# Patient Record
Sex: FEMALE | Race: OTHER | HISPANIC OR LATINO | Employment: UNEMPLOYED | URBAN - METROPOLITAN AREA
[De-identification: names, ages, dates, MRNs, and addresses within clinical notes are randomized per-mention and may not be internally consistent; named-entity substitution may affect disease eponyms.]

---

## 2018-04-19 ENCOUNTER — OFFICE VISIT (OUTPATIENT)
Dept: OBSTETRICS AND GYNECOLOGY | Facility: CLINIC | Age: 43
End: 2018-04-19
Payer: MEDICAID

## 2018-04-19 ENCOUNTER — HOSPITAL ENCOUNTER (OUTPATIENT)
Facility: HOSPITAL | Age: 43
Discharge: HOME | End: 2018-04-20
Attending: OBSTETRICS & GYNECOLOGY | Admitting: OBSTETRICS & GYNECOLOGY
Payer: MEDICAID

## 2018-04-19 ENCOUNTER — PROCEDURE VISIT (OUTPATIENT)
Dept: OBSTETRICS AND GYNECOLOGY | Facility: CLINIC | Age: 43
End: 2018-04-19
Payer: MEDICAID

## 2018-04-19 VITALS
BODY MASS INDEX: 51.96 KG/M2 | DIASTOLIC BLOOD PRESSURE: 86 MMHG | WEIGHT: 231 LBS | HEIGHT: 56 IN | SYSTOLIC BLOOD PRESSURE: 130 MMHG

## 2018-04-19 DIAGNOSIS — O03.9 SAB (SPONTANEOUS ABORTION): ICD-10-CM

## 2018-04-19 DIAGNOSIS — Z32.01 POSITIVE PREGNANCY TEST: ICD-10-CM

## 2018-04-19 DIAGNOSIS — O36.80X0 PREGNANCY OF UNKNOWN ANATOMIC LOCATION: ICD-10-CM

## 2018-04-19 DIAGNOSIS — O02.1 MISSED ABORTION: Primary | ICD-10-CM

## 2018-04-19 DIAGNOSIS — Z32.01 POSITIVE PREGNANCY TEST: Primary | ICD-10-CM

## 2018-04-19 DIAGNOSIS — O03.9 COMPLETE ABORTION: Primary | ICD-10-CM

## 2018-04-19 LAB
ABO + RH BLD: NORMAL
ANION GAP SERPL CALC-SCNC: 10 MMOL/L
BASOPHILS # BLD AUTO: 0.02 K/UL
BASOPHILS NFR BLD: 0.2 %
BLD GP AB SCN CELLS X3 SERPL QL: NORMAL
BUN SERPL-MCNC: 8 MG/DL
CALCIUM SERPL-MCNC: 9.2 MG/DL
CHLORIDE SERPL-SCNC: 108 MMOL/L
CO2 SERPL-SCNC: 21 MMOL/L
CREAT SERPL-MCNC: 0.6 MG/DL
DIFFERENTIAL METHOD: ABNORMAL
EOSINOPHIL # BLD AUTO: 0.1 K/UL
EOSINOPHIL NFR BLD: 0.6 %
ERYTHROCYTE [DISTWIDTH] IN BLOOD BY AUTOMATED COUNT: 14.9 %
EST. GFR  (AFRICAN AMERICAN): >60 ML/MIN/1.73 M^2
EST. GFR  (NON AFRICAN AMERICAN): >60 ML/MIN/1.73 M^2
GLUCOSE SERPL-MCNC: 82 MG/DL
HCG INTACT+B SERPL-ACNC: 550 MIU/ML
HCT VFR BLD AUTO: 35.3 %
HGB BLD-MCNC: 11.5 G/DL
LYMPHOCYTES # BLD AUTO: 2.5 K/UL
LYMPHOCYTES NFR BLD: 29.9 %
MCH RBC QN AUTO: 26.7 PG
MCHC RBC AUTO-ENTMCNC: 32.6 G/DL
MCV RBC AUTO: 82 FL
MONOCYTES # BLD AUTO: 0.4 K/UL
MONOCYTES NFR BLD: 4.9 %
NEUTROPHILS # BLD AUTO: 5.4 K/UL
NEUTROPHILS NFR BLD: 64.2 %
PLATELET # BLD AUTO: 259 K/UL
PMV BLD AUTO: 9.7 FL
POTASSIUM SERPL-SCNC: 3.6 MMOL/L
RBC # BLD AUTO: 4.3 M/UL
SODIUM SERPL-SCNC: 139 MMOL/L
WBC # BLD AUTO: 8.36 K/UL

## 2018-04-19 PROCEDURE — 99999 PR PBB SHADOW E&M-NEW PATIENT-LVL III: CPT | Mod: PBBFAC,,, | Performed by: OBSTETRICS & GYNECOLOGY

## 2018-04-19 PROCEDURE — 88305 TISSUE EXAM BY PATHOLOGIST: CPT | Performed by: PATHOLOGY

## 2018-04-19 PROCEDURE — 80048 BASIC METABOLIC PNL TOTAL CA: CPT

## 2018-04-19 PROCEDURE — 25000003 PHARM REV CODE 250: Performed by: OBSTETRICS & GYNECOLOGY

## 2018-04-19 PROCEDURE — G0379 DIRECT REFER HOSPITAL OBSERV: HCPCS

## 2018-04-19 PROCEDURE — 99203 OFFICE O/P NEW LOW 30 MIN: CPT | Mod: PBBFAC,TH,PO,25 | Performed by: OBSTETRICS & GYNECOLOGY

## 2018-04-19 PROCEDURE — 99203 OFFICE O/P NEW LOW 30 MIN: CPT | Mod: S$PBB,25,TH, | Performed by: OBSTETRICS & GYNECOLOGY

## 2018-04-19 PROCEDURE — G0378 HOSPITAL OBSERVATION PER HR: HCPCS

## 2018-04-19 PROCEDURE — 99220 PR INITIAL OBSERVATION CARE,LEVL III: CPT | Mod: ,,, | Performed by: OBSTETRICS & GYNECOLOGY

## 2018-04-19 PROCEDURE — 85025 COMPLETE CBC W/AUTO DIFF WBC: CPT

## 2018-04-19 PROCEDURE — 84702 CHORIONIC GONADOTROPIN TEST: CPT

## 2018-04-19 PROCEDURE — 76817 TRANSVAGINAL US OBSTETRIC: CPT | Mod: 26,S$PBB,, | Performed by: OBSTETRICS & GYNECOLOGY

## 2018-04-19 PROCEDURE — 76817 TRANSVAGINAL US OBSTETRIC: CPT | Mod: PBBFAC,PO

## 2018-04-19 PROCEDURE — 88305 TISSUE EXAM BY PATHOLOGIST: CPT | Mod: 26,,, | Performed by: PATHOLOGY

## 2018-04-19 PROCEDURE — 86850 RBC ANTIBODY SCREEN: CPT

## 2018-04-19 RX ORDER — MISOPROSTOL 200 UG/1
400 TABLET ORAL EVERY 6 HOURS
Status: DISCONTINUED | OUTPATIENT
Start: 2018-04-19 | End: 2018-04-20 | Stop reason: HOSPADM

## 2018-04-19 RX ORDER — DOXYCYCLINE HYCLATE 100 MG
100 TABLET ORAL EVERY 12 HOURS
Status: DISCONTINUED | OUTPATIENT
Start: 2018-04-19 | End: 2018-04-19

## 2018-04-19 RX ORDER — SODIUM CHLORIDE 0.9 % (FLUSH) 0.9 %
3 SYRINGE (ML) INJECTION
Status: DISCONTINUED | OUTPATIENT
Start: 2018-04-19 | End: 2018-04-20 | Stop reason: HOSPADM

## 2018-04-19 RX ORDER — HYDROMORPHONE HYDROCHLORIDE 2 MG/ML
1 INJECTION, SOLUTION INTRAMUSCULAR; INTRAVENOUS; SUBCUTANEOUS EVERY 4 HOURS PRN
Status: DISCONTINUED | OUTPATIENT
Start: 2018-04-19 | End: 2018-04-20 | Stop reason: HOSPADM

## 2018-04-19 RX ORDER — DOXYCYCLINE HYCLATE 100 MG
200 TABLET ORAL ONCE
Status: COMPLETED | OUTPATIENT
Start: 2018-04-19 | End: 2018-04-19

## 2018-04-19 RX ORDER — MISOPROSTOL 100 UG/1
400 TABLET ORAL EVERY 8 HOURS
Status: CANCELLED | OUTPATIENT
Start: 2018-04-19 | End: 2018-04-20

## 2018-04-19 RX ORDER — DOXYCYCLINE HYCLATE 100 MG
100 TABLET ORAL EVERY 12 HOURS
Status: DISCONTINUED | OUTPATIENT
Start: 2018-04-20 | End: 2018-04-20 | Stop reason: HOSPADM

## 2018-04-19 RX ORDER — HYDROCODONE BITARTRATE AND ACETAMINOPHEN 10; 325 MG/1; MG/1
1 TABLET ORAL EVERY 4 HOURS PRN
Status: CANCELLED | OUTPATIENT
Start: 2018-04-19

## 2018-04-19 RX ORDER — MISOPROSTOL 200 UG/1
400 TABLET ORAL EVERY 8 HOURS
Status: DISCONTINUED | OUTPATIENT
Start: 2018-04-19 | End: 2018-04-19

## 2018-04-19 RX ORDER — ONDANSETRON 4 MG/1
8 TABLET, ORALLY DISINTEGRATING ORAL EVERY 8 HOURS PRN
Status: CANCELLED | OUTPATIENT
Start: 2018-04-19

## 2018-04-19 RX ORDER — ONDANSETRON 8 MG/1
8 TABLET, ORALLY DISINTEGRATING ORAL EVERY 8 HOURS PRN
Status: DISCONTINUED | OUTPATIENT
Start: 2018-04-19 | End: 2018-04-20 | Stop reason: HOSPADM

## 2018-04-19 RX ORDER — IBUPROFEN 600 MG/1
600 TABLET ORAL EVERY 6 HOURS PRN
Status: CANCELLED | OUTPATIENT
Start: 2018-04-19

## 2018-04-19 RX ORDER — HYDROMORPHONE HYDROCHLORIDE 2 MG/ML
1 INJECTION, SOLUTION INTRAMUSCULAR; INTRAVENOUS; SUBCUTANEOUS EVERY 4 HOURS PRN
Status: CANCELLED | OUTPATIENT
Start: 2018-04-19

## 2018-04-19 RX ORDER — HYDROCODONE BITARTRATE AND ACETAMINOPHEN 10; 325 MG/1; MG/1
1 TABLET ORAL EVERY 4 HOURS PRN
Status: DISCONTINUED | OUTPATIENT
Start: 2018-04-19 | End: 2018-04-20 | Stop reason: HOSPADM

## 2018-04-19 RX ORDER — SODIUM CHLORIDE 0.9 % (FLUSH) 0.9 %
3 SYRINGE (ML) INJECTION
Status: CANCELLED | OUTPATIENT
Start: 2018-04-19

## 2018-04-19 RX ORDER — HYDROCODONE BITARTRATE AND ACETAMINOPHEN 5; 325 MG/1; MG/1
1 TABLET ORAL EVERY 4 HOURS PRN
Status: CANCELLED | OUTPATIENT
Start: 2018-04-19

## 2018-04-19 RX ORDER — HYDROCODONE BITARTRATE AND ACETAMINOPHEN 5; 325 MG/1; MG/1
1 TABLET ORAL EVERY 4 HOURS PRN
Status: DISCONTINUED | OUTPATIENT
Start: 2018-04-19 | End: 2018-04-20 | Stop reason: HOSPADM

## 2018-04-19 RX ORDER — MISOPROSTOL 200 UG/1
400 TABLET ORAL EVERY 6 HOURS
Status: DISCONTINUED | OUTPATIENT
Start: 2018-04-19 | End: 2018-04-19

## 2018-04-19 RX ORDER — IBUPROFEN 600 MG/1
600 TABLET ORAL EVERY 6 HOURS PRN
Status: DISCONTINUED | OUTPATIENT
Start: 2018-04-19 | End: 2018-04-20 | Stop reason: HOSPADM

## 2018-04-19 RX ADMIN — HYDROCODONE BITARTRATE AND ACETAMINOPHEN 1 TABLET: 10; 325 TABLET ORAL at 11:04

## 2018-04-19 RX ADMIN — MISOPROSTOL 400 MCG: 200 TABLET ORAL at 11:04

## 2018-04-19 RX ADMIN — DOXYCYCLINE HYCLATE 200 MG: 100 TABLET, COATED ORAL at 06:04

## 2018-04-19 RX ADMIN — IBUPROFEN 600 MG: 600 TABLET, FILM COATED ORAL at 11:04

## 2018-04-19 RX ADMIN — MISOPROSTOL 400 MCG: 200 TABLET ORAL at 06:04

## 2018-04-19 NOTE — H&P
CC miscarriage     HPI:   Sydnee Don 43 y.o.   at 12 wga by lmp. She had her last cycle 18. She thought she was going through menopause then started bleeding a week ago then had heavier bleeding yesterday so she went to [facility] and was told she was pregnant so went to  ER and was told she was having a SAB and to f/u with OB gyn. She reports the bleeding is less but she is still having cramping.        No LMP recorded.      History reviewed. No pertinent past medical history.           Past Surgical History:   Procedure Laterality Date     SECTION                   Family History   Problem Relation Age of Onset    Hyperlipidemia Father      Hypertension Father      Hyperlipidemia Mother      Hypertension Mother      Arthritis Mother           Social History   Social History            Social History    Marital status:        Spouse name: N/A    Number of children: N/A    Years of education: N/A          Occupational History    Not on file.            Social History Main Topics    Smoking status: Never Smoker    Smokeless tobacco: Never Used    Alcohol use No    Drug use: No    Sexual activity: Yes       Partners: Male           Other Topics Concern    Not on file          Social History Narrative    No narrative on file                     OB History       Para Term  AB Living     7 4 4   3 4     SAB TAB Ectopic Multiple Live Births                          COMPREHENSIVE GYN HISTORY:  PAP History: Denies abnormal Paps.  Infection History: Denies STDs. Denies PID.  Benign History: Denies uterine fibroids. Denies ovarian cysts. Denies endometriosis.   Cancer History: Denies cervical cancer. Denies uterine cancer or hyperplasia. Denies ovarian cancer. Denies vulvar cancer or pre-cancer. Denies vaginal cancer or pre-cancer. Denies breast cancer. Denies colon cancer.  Sexual Activity History:   reports that she currently engages in sexual activity and has had  "male partners.   Menstrual History: Age of menarche:11 years. Every mon, flows for 5d days. moderate flow.  Dysmenorrhea History: Reports mild dysmenorrhea.  Contraception: none         ROS:  GENERAL: Denies weight gain or weight loss. Feeling well overall.   SKIN: Denies rash or lesions.   HEAD: Denies headache.   CHEST: Denies chest pain   RESPIRATORY: Denies shortness of breath  ABDOMEN: No abdominal pain, constipation, diarrhea, nausea, vomiting or rectal bleeding.   URINARY: No frequency, dysuria, hematuria, or burning on urination.  REPRODUCTIVE: See HPI.   All other ROS negative     PE:   /86   Ht 4' 8" (1.422 m)   Wt 104.8 kg (231 lb)   BMI 51.79 kg/m²      APPEARANCE: Well nourished, well developed, in no acute distress.  NEUROLOGIC: orientated to person, place and time, normal mood and affect   NECK: no masses, tracheal position normal, thyroid not enlarged, no masses   SKIN: no rashes or lesions  RESPIRATORY: normal respiratory effort, no use of accessory muscles   CARDIOVASCULAR: RRR, no murmurs, extremities normal with no edema    ABDOMEN: Soft. No tenderness or masses. No hernias. No hepatosplenomegaly noted.   PELVIC:   EXTERNAL GENITALIA/VULVA: No lesions. Normal female genitalia.  URETHRAL MEATUS: Normal size and location, no lesions, no prolapse.  URETHRA: No masses, tenderness, prolapse or scarring.  BLADDER: non-tender, no masses  VAGINA: Moist and well rugated, no discharge, no significant cystocele or rectocele.  CERVIX: dilated 2 cm with POC coming through os, removed with ring forceps  UTERUS: 14 week size, regular shape, mobile, non-tender, bladder base nontender.  ADNEXA: No masses or tenderness.  PERINEUM: normal in appearance, no external hemorrhoids      TVUS: uterus 15x6.2x7cm with EMS of 1.7cm with with POC and GS in cervix that appears dilated     1. Positive pregnancy test    2. Pregnancy of unknown anatomic location    3. SAB (spontaneous )          Plan:  1. " Patient with incomplete Ab. We discussed options. I recommend D&C since her cervix is open and she would be at risk for infection but she would not like to do that at this time. We discussed that I would recommend observation with cytotec so we could monitor her bleeding. We could check blood counts and group and RH and HCG at that time will do and repeat US in the am.

## 2018-04-19 NOTE — PROGRESS NOTES
Chief Complaint   Patient presents with    Miscarriage       HPI:   Sdynee Don 43 y.o.   at 12 wga by lmp. She had her last cycle 18. She thought she was going through menopause then started bleeding a week ago then had heavier bleeding yesterday so she went to DOC and was told she was pregnant so went to  ER and was told she was having a SAB and to f/u with OB gyn. She reports the bleeding is less but she is still having cramping.       No LMP recorded.     History reviewed. No pertinent past medical history.    Past Surgical History:   Procedure Laterality Date     SECTION         Family History   Problem Relation Age of Onset    Hyperlipidemia Father     Hypertension Father     Hyperlipidemia Mother     Hypertension Mother     Arthritis Mother        Social History     Social History    Marital status:      Spouse name: N/A    Number of children: N/A    Years of education: N/A     Occupational History    Not on file.     Social History Main Topics    Smoking status: Never Smoker    Smokeless tobacco: Never Used    Alcohol use No    Drug use: No    Sexual activity: Yes     Partners: Male     Other Topics Concern    Not on file     Social History Narrative    No narrative on file       OB History      Para Term  AB Living    7 4 4   3 4    SAB TAB Ectopic Multiple Live Births                        COMPREHENSIVE GYN HISTORY:  PAP History: Denies abnormal Paps.  Infection History: Denies STDs. Denies PID.  Benign History: Denies uterine fibroids. Denies ovarian cysts. Denies endometriosis.   Cancer History: Denies cervical cancer. Denies uterine cancer or hyperplasia. Denies ovarian cancer. Denies vulvar cancer or pre-cancer. Denies vaginal cancer or pre-cancer. Denies breast cancer. Denies colon cancer.  Sexual Activity History:   reports that she currently engages in sexual activity and has had male partners.   Menstrual History: Age of menarche:11  "years. Every mon, flows for 5d days. moderate flow.  Dysmenorrhea History: Reports mild dysmenorrhea.  Contraception: none       ROS:  GENERAL: Denies weight gain or weight loss. Feeling well overall.   SKIN: Denies rash or lesions.   HEAD: Denies headache.   CHEST: Denies chest pain   RESPIRATORY: Denies shortness of breath  ABDOMEN: No abdominal pain, constipation, diarrhea, nausea, vomiting or rectal bleeding.   URINARY: No frequency, dysuria, hematuria, or burning on urination.  REPRODUCTIVE: See HPI.   All other ROS negative     PE:   /86   Ht 4' 8" (1.422 m)   Wt 104.8 kg (231 lb)   BMI 51.79 kg/m²     APPEARANCE: Well nourished, well developed, in no acute distress.  NEUROLOGIC: orientated to person, place and time, normal mood and affect   NECK: no masses, tracheal position normal, thyroid not enlarged, no masses   SKIN: no rashes or lesions  RESPIRATORY: normal respiratory effort, no use of accessory muscles   CARDIOVASCULAR: RRR, no murmurs, extremities normal with no edema    ABDOMEN: Soft. No tenderness or masses. No hernias. No hepatosplenomegaly noted.   PELVIC:   EXTERNAL GENITALIA/VULVA: No lesions. Normal female genitalia.  URETHRAL MEATUS: Normal size and location, no lesions, no prolapse.  URETHRA: No masses, tenderness, prolapse or scarring.  BLADDER: non-tender, no masses  VAGINA: Moist and well rugated, no discharge, no significant cystocele or rectocele.  CERVIX: dilated 2 cm with POC coming through os, removed with ring forceps  UTERUS: 14 week size, regular shape, mobile, non-tender, bladder base nontender.  ADNEXA: No masses or tenderness.  PERINEUM: normal in appearance, no external hemorrhoids     TVUS: uterus 15x6.2x7cm with EMS of 1.7cm with with POC and GS in cervix that appears dilated    1. Positive pregnancy test    2. Pregnancy of unknown anatomic location    3. SAB (spontaneous )        Plan:  1. Patient with incomplete Ab. We discussed options. I recommend D&C " since her cervix is open and she would be at risk for infection but she would not like to do that at this time. We discussed that I would recommend observation with cytotec so we could monitor her bleeding. We could check blood counts and group and RH and HCG at that time will do and repeat US in the am.

## 2018-04-19 NOTE — PLAN OF CARE
Admitted to unit, via pt ambulating as direct admit, with no distress noted.  Resp even and unlabored.  Accompanied by family member. Sitting up in bed.  Oriented pt to room, surroundings, and call light.  Verbalized understanding.  Denies pain at present.  Resting quietly with no distress.  Safety maintained with  Call light in reach.

## 2018-04-20 ENCOUNTER — TELEPHONE (OUTPATIENT)
Dept: OBSTETRICS AND GYNECOLOGY | Facility: CLINIC | Age: 43
End: 2018-04-20

## 2018-04-20 VITALS
TEMPERATURE: 98 F | OXYGEN SATURATION: 100 % | DIASTOLIC BLOOD PRESSURE: 63 MMHG | HEART RATE: 66 BPM | SYSTOLIC BLOOD PRESSURE: 117 MMHG | RESPIRATION RATE: 18 BRPM

## 2018-04-20 DIAGNOSIS — O03.4 INCOMPLETE ABORTION: Primary | ICD-10-CM

## 2018-04-20 PROBLEM — D50.0 IRON DEFICIENCY ANEMIA DUE TO CHRONIC BLOOD LOSS: Status: ACTIVE | Noted: 2018-04-20

## 2018-04-20 PROBLEM — O03.9 COMPLETE ABORTION: Status: ACTIVE | Noted: 2018-04-20

## 2018-04-20 LAB
BASOPHILS # BLD AUTO: 0.02 K/UL
BASOPHILS NFR BLD: 0.2 %
DIFFERENTIAL METHOD: ABNORMAL
EOSINOPHIL # BLD AUTO: 0.1 K/UL
EOSINOPHIL NFR BLD: 1.7 %
ERYTHROCYTE [DISTWIDTH] IN BLOOD BY AUTOMATED COUNT: 14.7 %
HCT VFR BLD AUTO: 33.5 %
HGB BLD-MCNC: 10.9 G/DL
LYMPHOCYTES # BLD AUTO: 3.9 K/UL
LYMPHOCYTES NFR BLD: 48.2 %
MCH RBC QN AUTO: 26.7 PG
MCHC RBC AUTO-ENTMCNC: 32.5 G/DL
MCV RBC AUTO: 82 FL
MONOCYTES # BLD AUTO: 0.6 K/UL
MONOCYTES NFR BLD: 6.7 %
NEUTROPHILS # BLD AUTO: 3.5 K/UL
NEUTROPHILS NFR BLD: 43 %
PLATELET # BLD AUTO: 238 K/UL
PMV BLD AUTO: 9.3 FL
RBC # BLD AUTO: 4.08 M/UL
WBC # BLD AUTO: 8.17 K/UL

## 2018-04-20 PROCEDURE — 25000003 PHARM REV CODE 250: Performed by: OBSTETRICS & GYNECOLOGY

## 2018-04-20 PROCEDURE — 85025 COMPLETE CBC W/AUTO DIFF WBC: CPT

## 2018-04-20 PROCEDURE — 36415 COLL VENOUS BLD VENIPUNCTURE: CPT

## 2018-04-20 PROCEDURE — 99225 PR SUBSEQUENT OBSERVATION CARE,LEVEL II: CPT | Mod: ,,, | Performed by: OBSTETRICS & GYNECOLOGY

## 2018-04-20 PROCEDURE — G0378 HOSPITAL OBSERVATION PER HR: HCPCS

## 2018-04-20 RX ORDER — IBUPROFEN 600 MG/1
600 TABLET ORAL EVERY 6 HOURS PRN
Qty: 30 TABLET | Refills: 0 | Status: SHIPPED | OUTPATIENT
Start: 2018-04-20 | End: 2018-05-29 | Stop reason: SDUPTHER

## 2018-04-20 RX ADMIN — MISOPROSTOL 400 MCG: 200 TABLET ORAL at 11:04

## 2018-04-20 RX ADMIN — MISOPROSTOL 400 MCG: 200 TABLET ORAL at 06:04

## 2018-04-20 RX ADMIN — DOXYCYCLINE HYCLATE 100 MG: 100 TABLET, COATED ORAL at 06:04

## 2018-04-20 NOTE — PLAN OF CARE
This nurse attempted to start SL x 2 unsuccessful attempts.  20 g to Rt FA and 22 g to Rt hand unsuccessful.  Naila Desouza/charge RN attemtped to start SL x 2 unsuccessful attempts.  20 g to Rt FA and 22 g to Lt Fa.  Xenia Barr Rn, attempted SL x 2 unsuccessful attempts.  20g to Rt and Lt Fa unsuccessful.  Will report to oncoming nurse of unable to start Sl.  Informed pt ok for regular diet.  Dietary notified.  Informed pt of NPO after Mn.  Pt verbalized understanding, and written on dry erase board in room.

## 2018-04-20 NOTE — DISCHARGE INSTRUCTIONS
Patient Discharge Instructions for Postpartum Women    Resume Regular Diet  Increase activity gradually, no heavy lifting  Shower  No tampons, douching or sexual intercourse.  Discuss birth control options with your physician.  Wear a support bra  Return to work/school when you've been cleared by a physician    Call your physician if     *Fever of 100.4 or higher  *Persistent nausea/ vomiting    *Heavy vaginal bleeding or large clots (Heavy bleeding is soaking 1 pad in an hour)  *Swelling and pain in arms or legs  *Severe headaches, blurred vision or fainting  *Shortness of breath  *Frequency and burning with urination  *Signs of postpartum depression, discuss these signs with your physician

## 2018-04-20 NOTE — PROGRESS NOTES
Pt to U/S via wheelchair with transporter    0928  Pt arrived back to room from u/s in stable condition

## 2018-04-20 NOTE — PROGRESS NOTES
2018  HD 2    Sydnee Don 43 y.o.  who was seen in the office yesterday and was noted to have an incomplete AB with POC coming through the cervical os. They were removed with a ring forceps and US showed thickened EMS. Her cervix remained opened but patient declined D&C so admitted for observation. She is s/p 2 doses of cytotec. She reports her bleeding was heavy at first but is now light. RN reports light bleeding all night. Tolerating PO without N/V though made NPO this am. Ambulating and urinating without difficulty. Has passed gas but no Bm. Scant vaginal bleeding. Pain controlled with Po pain medications.     Temp:  [97.7 °F (36.5 °C)-98.4 °F (36.9 °C)]   Pulse:  [66-98]   Resp:  [16-18]   BP: (109-143)/(56-93)   SpO2:  [99 %]     In bed, NAD, RRR, CTA B, abd: S/aTTP/ND   Pelvic: cervix closed, uterus 12 week sized, non tender   Ext: no c/c/e    Yesterday H/H: 11.5/35.3    Recent Labs  Lab 18  0709   WBC 8.17   RBC 4.08   HGB 10.9*   HCT 33.5*      MCV 82   MCH 26.7*   MCHC 32.5     HC   A+     A/P: 43 y.o. with incomplete AB   1. Discussed with patient the concern would be for retained POC. However her cervix is closed this am which is a good indication that the ab was likely completed in the office. H/H decreased slightly.  Will  recheck US if improved will discharge with 2 more doses of cytotec and see back in the office next week for repeat Us. Discussed with her we will need to follow her HCGs to zero. Discussed precautions of infection and bleeding and when to come back to Er.

## 2018-04-20 NOTE — PLAN OF CARE
52395 - Pt has remained NPO since midnight. Voids without difficulty. Minimal bleeding noted to seamus pads throughout shift. Small clots noted to pad once. C/o cramping and HA better with po pain meds. SCD's in place and working. VSS. No other needs or concerns voiced.

## 2018-04-20 NOTE — DISCHARGE SUMMARY
Admitting Diagnosis: incomplete AB   Discharge Diagnosis: complete AB   Service: OB-GYN, [facility]   Consults: none   Disposition: home  Hospital Course: Patient was admitted to mother baby for monitoring after likely completing AB in office. She was given cytotec 3 doses and doxycyline since her cervix was opened. She declined a D&C. Her vaginal bleeding was scant and her repeat US the next morning shows EMS 8mm.   Medications: OTC ibuprofen,  Follow up: in 1 week in clinic to get US and visit   Instructions:   Call or return to ED for fever >100.4, foul smelling vaginal discharge, heavy vaginal bleeding, abdominal pain not responsive to medications or other concerns.

## 2018-04-23 PROBLEM — D50.0 IRON DEFICIENCY ANEMIA DUE TO CHRONIC BLOOD LOSS: Status: ACTIVE | Noted: 2018-04-23

## 2018-04-23 PROBLEM — O03.9 COMPLETE ABORTION: Status: ACTIVE | Noted: 2018-04-23

## 2018-04-26 DIAGNOSIS — O03.9 COMPLETE ABORTION: Primary | ICD-10-CM

## 2018-04-27 ENCOUNTER — PROCEDURE VISIT (OUTPATIENT)
Dept: OBSTETRICS AND GYNECOLOGY | Facility: CLINIC | Age: 43
End: 2018-04-27
Payer: MEDICAID

## 2018-04-27 ENCOUNTER — OFFICE VISIT (OUTPATIENT)
Dept: OBSTETRICS AND GYNECOLOGY | Facility: CLINIC | Age: 43
End: 2018-04-27
Payer: MEDICAID

## 2018-04-27 ENCOUNTER — PATIENT MESSAGE (OUTPATIENT)
Dept: OBSTETRICS AND GYNECOLOGY | Facility: CLINIC | Age: 43
End: 2018-04-27

## 2018-04-27 ENCOUNTER — LAB VISIT (OUTPATIENT)
Dept: LAB | Facility: HOSPITAL | Age: 43
End: 2018-04-27
Attending: OBSTETRICS & GYNECOLOGY
Payer: MEDICAID

## 2018-04-27 VITALS
SYSTOLIC BLOOD PRESSURE: 122 MMHG | BODY MASS INDEX: 51.96 KG/M2 | HEIGHT: 56 IN | DIASTOLIC BLOOD PRESSURE: 80 MMHG | WEIGHT: 231 LBS

## 2018-04-27 DIAGNOSIS — D50.0 IRON DEFICIENCY ANEMIA DUE TO CHRONIC BLOOD LOSS: ICD-10-CM

## 2018-04-27 DIAGNOSIS — O03.9 SPONTANEOUS ABORTION: Primary | ICD-10-CM

## 2018-04-27 DIAGNOSIS — Z32.01 POSITIVE PREGNANCY TEST: ICD-10-CM

## 2018-04-27 DIAGNOSIS — O03.9 COMPLETE ABORTION: ICD-10-CM

## 2018-04-27 DIAGNOSIS — R93.89 INCREASED ENDOMETRIAL STRIPE THICKNESS: ICD-10-CM

## 2018-04-27 DIAGNOSIS — O03.9 SAB (SPONTANEOUS ABORTION): ICD-10-CM

## 2018-04-27 DIAGNOSIS — O36.80X0 PREGNANCY OF UNKNOWN ANATOMIC LOCATION: ICD-10-CM

## 2018-04-27 DIAGNOSIS — O03.9 SAB (SPONTANEOUS ABORTION): Primary | ICD-10-CM

## 2018-04-27 LAB
ABO + RH BLD: NORMAL
BASOPHILS # BLD AUTO: 0.01 K/UL
BASOPHILS NFR BLD: 0.1 %
DIFFERENTIAL METHOD: ABNORMAL
EOSINOPHIL # BLD AUTO: 0.1 K/UL
EOSINOPHIL NFR BLD: 0.7 %
ERYTHROCYTE [DISTWIDTH] IN BLOOD BY AUTOMATED COUNT: 14 %
HCG INTACT+B SERPL-ACNC: 69 MIU/ML
HCT VFR BLD AUTO: 35.9 %
HGB BLD-MCNC: 11.6 G/DL
LYMPHOCYTES # BLD AUTO: 3.1 K/UL
LYMPHOCYTES NFR BLD: 37.3 %
MCH RBC QN AUTO: 26.3 PG
MCHC RBC AUTO-ENTMCNC: 32.3 G/DL
MCV RBC AUTO: 81 FL
MONOCYTES # BLD AUTO: 0.4 K/UL
MONOCYTES NFR BLD: 4.6 %
NEUTROPHILS # BLD AUTO: 4.7 K/UL
NEUTROPHILS NFR BLD: 57.1 %
PLATELET # BLD AUTO: 312 K/UL
PMV BLD AUTO: 9.9 FL
RBC # BLD AUTO: 4.41 M/UL
WBC # BLD AUTO: 8.25 K/UL

## 2018-04-27 PROCEDURE — 84702 CHORIONIC GONADOTROPIN TEST: CPT

## 2018-04-27 PROCEDURE — 36415 COLL VENOUS BLD VENIPUNCTURE: CPT

## 2018-04-27 PROCEDURE — 76817 TRANSVAGINAL US OBSTETRIC: CPT | Mod: 26,S$PBB,, | Performed by: OBSTETRICS & GYNECOLOGY

## 2018-04-27 PROCEDURE — 99212 OFFICE O/P EST SF 10 MIN: CPT | Mod: PBBFAC,TH,PO,25 | Performed by: OBSTETRICS & GYNECOLOGY

## 2018-04-27 PROCEDURE — 85025 COMPLETE CBC W/AUTO DIFF WBC: CPT

## 2018-04-27 PROCEDURE — 86901 BLOOD TYPING SEROLOGIC RH(D): CPT

## 2018-04-27 PROCEDURE — 76817 TRANSVAGINAL US OBSTETRIC: CPT | Mod: PBBFAC,PO

## 2018-04-27 PROCEDURE — 99999 PR PBB SHADOW E&M-EST. PATIENT-LVL II: CPT | Mod: PBBFAC,,, | Performed by: OBSTETRICS & GYNECOLOGY

## 2018-04-27 PROCEDURE — 99212 OFFICE O/P EST SF 10 MIN: CPT | Mod: S$PBB,TH,25, | Performed by: OBSTETRICS & GYNECOLOGY

## 2018-04-27 RX ORDER — MISOPROSTOL 200 UG/1
TABLET ORAL
Qty: 12 TABLET | Refills: 0 | Status: SHIPPED | OUTPATIENT
Start: 2018-04-27 | End: 2018-07-18

## 2018-04-27 NOTE — PROGRESS NOTES
"Chief Complaint   Patient presents with    Follow-up     sab       HPI:   Sydnee Don 43 y.o.  is here for follow up SAB. She was seen last week and found to have an incomplete AB with products coming out the os in the office. They were removed and she declined a D&C and was observed in the hospital and given cytotec. Her EMS was 8mm at discharge. She reports the bleeding has stopped but she is still having occasional cramping.       No LMP recorded.     History reviewed. No pertinent past medical history.    Past Surgical History:   Procedure Laterality Date     SECTION         Family History   Problem Relation Age of Onset    Hyperlipidemia Father     Hypertension Father     Hyperlipidemia Mother     Hypertension Mother     Arthritis Mother        Social History     Social History    Marital status:      Spouse name: N/A    Number of children: N/A    Years of education: N/A     Occupational History    Not on file.     Social History Main Topics    Smoking status: Never Smoker    Smokeless tobacco: Never Used    Alcohol use No    Drug use: No    Sexual activity: Yes     Partners: Male     Other Topics Concern    Not on file     Social History Narrative    No narrative on file       OB History      Para Term  AB Living    7 4 4   3 4    SAB TAB Ectopic Multiple Live Births                        ROS:  GENERAL: Denies weight gain or weight loss. Feeling well overall.   SKIN: Denies rash or lesions.   HEAD: Denies headache.   CHEST: Denies chest pain   RESPIRATORY: Denies shortness of breath  ABDOMEN: No abdominal pain, constipation, diarrhea, nausea, vomiting or rectal bleeding.   URINARY: No frequency, dysuria, hematuria, or burning on urination.  REPRODUCTIVE: See HPI.   All other ROS negative     PE:   /80   Ht 4' 8" (1.422 m)   Wt 104.8 kg (231 lb)   BMI 51.79 kg/m²     APPEARANCE: Well nourished, well developed, in no acute distress.  NEUROLOGIC: " orientated to person, place and time, normal mood and affect   NECK: no masses, tracheal position normal, thyroid not enlarged, no masses   SKIN: no rashes or lesions  RESPIRATORY: normal respiratory effort, no use of accessory muscles   CARDIOVASCULAR: RRR, no murmurs, extremities normal with no edema    ABDOMEN: Soft. No tenderness or masses. No hernias. No hepatosplenomegaly noted.   PELVIC:   EXTERNAL GENITALIA/VULVA: No lesions. Normal female genitalia.  URETHRAL MEATUS: Normal size and location, no lesions, no prolapse.  URETHRA: No masses, tenderness, prolapse or scarring.  BLADDER: non-tender, no masses  VAGINA: Moist and well rugated, no discharge, no significant cystocele or rectocele.  CERVIX: No lesions and discharge.  UTERUS: about 12 week size, regular shape, mobile, non-tender, bladder base nontender.  ADNEXA: No masses or tenderness.  PERINEUM: normal in appearance, no external hemorrhoids     US: enlarged anteverted uterus 11x6x7.8 cm with EMS 1.2cm   Pathology: 1. Products of conception, curettage: -Decidualized endometrium with acute inflammation and necrosis. -Chorionic villi identified.  2. Proximal conception, curettage: -Predominantly blood clot with scattered fragments of necrotic tissue. -No chorionic villi identified.    1. SAB (spontaneous )    2. Increased endometrial stripe thickness    3. Iron deficiency anemia due to chronic blood loss        Plan:  1. Discussed with her that EMS is still thickened, this could be left over POC or blood clots. We discussed options would be observation, cytotec or D&C. She would like to do the cytotec. We will repeat the HCG until it goes to zero and recheck her blood counts today. I discussed to expect significant cramping with the cytotec and bleeding but if she bleeds more than a pad an hour for 2 hours then should come to ER. Will also recheck blood counts today. Will see back in one week to repeat US and exam.

## 2018-04-30 ENCOUNTER — PATIENT MESSAGE (OUTPATIENT)
Dept: OBSTETRICS AND GYNECOLOGY | Facility: CLINIC | Age: 43
End: 2018-04-30

## 2018-05-02 ENCOUNTER — TELEPHONE (OUTPATIENT)
Dept: OBSTETRICS AND GYNECOLOGY | Facility: CLINIC | Age: 43
End: 2018-05-02

## 2018-05-02 ENCOUNTER — PROCEDURE VISIT (OUTPATIENT)
Dept: OBSTETRICS AND GYNECOLOGY | Facility: CLINIC | Age: 43
End: 2018-05-02
Payer: MEDICAID

## 2018-05-02 ENCOUNTER — OFFICE VISIT (OUTPATIENT)
Dept: OBSTETRICS AND GYNECOLOGY | Facility: CLINIC | Age: 43
End: 2018-05-02
Payer: MEDICAID

## 2018-05-02 VITALS
BODY MASS INDEX: 52.47 KG/M2 | HEIGHT: 56 IN | DIASTOLIC BLOOD PRESSURE: 88 MMHG | WEIGHT: 233.25 LBS | SYSTOLIC BLOOD PRESSURE: 120 MMHG

## 2018-05-02 DIAGNOSIS — O03.9 COMPLETE ABORTION: Primary | ICD-10-CM

## 2018-05-02 DIAGNOSIS — Z30.011 ENCOUNTER FOR INITIAL PRESCRIPTION OF CONTRACEPTIVE PILLS: ICD-10-CM

## 2018-05-02 DIAGNOSIS — O36.80X0 PREGNANCY OF UNKNOWN ANATOMIC LOCATION: ICD-10-CM

## 2018-05-02 DIAGNOSIS — O36.80X0 PREGNANCY OF UNKNOWN ANATOMIC LOCATION: Primary | ICD-10-CM

## 2018-05-02 PROCEDURE — 99213 OFFICE O/P EST LOW 20 MIN: CPT | Mod: 25,TH,S$PBB, | Performed by: OBSTETRICS & GYNECOLOGY

## 2018-05-02 PROCEDURE — 76817 TRANSVAGINAL US OBSTETRIC: CPT | Mod: PBBFAC,PO

## 2018-05-02 PROCEDURE — 76817 TRANSVAGINAL US OBSTETRIC: CPT | Mod: 26,S$PBB,, | Performed by: OBSTETRICS & GYNECOLOGY

## 2018-05-02 PROCEDURE — 99999 PR PBB SHADOW E&M-EST. PATIENT-LVL III: CPT | Mod: PBBFAC,,, | Performed by: OBSTETRICS & GYNECOLOGY

## 2018-05-02 PROCEDURE — 99213 OFFICE O/P EST LOW 20 MIN: CPT | Mod: PBBFAC,TH,PO,25 | Performed by: OBSTETRICS & GYNECOLOGY

## 2018-05-02 RX ORDER — NORETHINDRONE ACETATE AND ETHINYL ESTRADIOL .02; 1 MG/1; MG/1
1 TABLET ORAL DAILY
Qty: 28 TABLET | Refills: 11 | Status: SHIPPED | OUTPATIENT
Start: 2018-05-02 | End: 2018-06-01

## 2018-05-02 NOTE — PROGRESS NOTES
"Chief Complaint   Patient presents with    Gynecologic Exam     follow up on missed ab       HPI:   Sydnee Don 43 y.o.  is here for follow up SAB. She was seen last week and found to have an incomplete AB with products coming out the os in the office. They were removed and she declined a D&C and was observed in the hospital and given cytotec. Her EMS was 8mm at discharge. She took cytotec and had cramping but no more bleeding.      No LMP recorded.     History reviewed. No pertinent past medical history.    Past Surgical History:   Procedure Laterality Date     SECTION         Family History   Problem Relation Age of Onset    Hyperlipidemia Father     Hypertension Father     Hyperlipidemia Mother     Hypertension Mother     Arthritis Mother        Social History     Social History    Marital status:      Spouse name: N/A    Number of children: N/A    Years of education: N/A     Occupational History    Not on file.     Social History Main Topics    Smoking status: Never Smoker    Smokeless tobacco: Never Used    Alcohol use No    Drug use: No    Sexual activity: Yes     Partners: Male     Other Topics Concern    Not on file     Social History Narrative    No narrative on file       OB History      Para Term  AB Living    7 4 4   3 4    SAB TAB Ectopic Multiple Live Births                        ROS:  GENERAL: Denies weight gain or weight loss. Feeling well overall.   SKIN: Denies rash or lesions.   HEAD: Denies headache.   CHEST: Denies chest pain   RESPIRATORY: Denies shortness of breath  ABDOMEN: No abdominal pain, constipation, diarrhea, nausea, vomiting or rectal bleeding.   URINARY: No frequency, dysuria, hematuria, or burning on urination.  REPRODUCTIVE: See HPI.   All other ROS negative     PE:   /88   Ht 4' 8" (1.422 m)   Wt 105.8 kg (233 lb 4 oz)   BMI 52.29 kg/m²     APPEARANCE: Well nourished, well developed, in no acute distress.  NEUROLOGIC: " orientated to person, place and time, normal mood and affect   NECK: no masses, tracheal position normal, thyroid not enlarged, no masses   SKIN: no rashes or lesions  RESPIRATORY: normal respiratory effort, no use of accessory muscles   CARDIOVASCULAR: RRR, no murmurs, extremities normal with no edema    ABDOMEN: Soft. No tenderness or masses. No hernias. No hepatosplenomegaly noted.       US: enlarged anteverted uterus 11x6x7.8 cm with EMS 1.2cm   Pathology: 1. Products of conception, curettage: -Decidualized endometrium with acute inflammation and necrosis. -Chorionic villi identified.  2. Proximal conception, curettage: -Predominantly blood clot with scattered fragments of necrotic tissue. -No chorionic villi identified.    US: 10.5x6x7.5cc with EMS 4 mm     1. Complete         Plan:  1. EMS is now thin. Likely completed AB, will follow hcg down to zero and should go do next one today.   2. Doesn't desire pregnancy. Counseled on contraception for 15 mintues. Will start OCPs. We discussed the increased risk of DVT due to her age and obesity but that she has no other definite contraindications. We discussed IUD, depo, nexplanon and other birth controls including BTL/essure and she would like to do the pills for now.  The use of the oral contraceptive has been fully discussed with the patient. This includes the proper method to initiate and continue the pills, the need for regular compliance to ensure adequate contraceptive effect, the physiology which make the pill effective, the instructions for what to do in event of a missed pill, and warnings about anticipated minor side effects such as breakthrough spotting, nausea, breast tenderness, weight changes, acne, headaches, etc.  She has been told of the more serious potential side effects such as MI, stroke, and deep vein thrombosis, all of which are very unlikely.  She has been asked to report any signs of such serious problems immediately.  She should  back up the pill with a condom during any cycle in which antibiotics are prescribed, and during the first cycle as well. The need for additional protection, such as a condom, to prevent exposure to sexually transmitted diseases has also been discussed- the patient has been clearly reminded that OCP's cannot protect her against diseases such as HIV and others. She understands and wishes to take the medication as prescribed.

## 2018-05-04 ENCOUNTER — PATIENT MESSAGE (OUTPATIENT)
Dept: OBSTETRICS AND GYNECOLOGY | Facility: CLINIC | Age: 43
End: 2018-05-04

## 2018-05-04 ENCOUNTER — PATIENT MESSAGE (OUTPATIENT)
Dept: ADMINISTRATIVE | Facility: OTHER | Age: 43
End: 2018-05-04

## 2018-05-04 ENCOUNTER — TELEPHONE (OUTPATIENT)
Dept: OBSTETRICS AND GYNECOLOGY | Facility: CLINIC | Age: 43
End: 2018-05-04

## 2018-05-04 DIAGNOSIS — Z12.31 SCREENING MAMMOGRAM, ENCOUNTER FOR: Primary | ICD-10-CM

## 2018-05-16 ENCOUNTER — HOSPITAL ENCOUNTER (OUTPATIENT)
Dept: RADIOLOGY | Facility: HOSPITAL | Age: 43
Discharge: HOME | End: 2018-05-16
Attending: OBSTETRICS & GYNECOLOGY
Payer: MEDICAID

## 2018-05-16 DIAGNOSIS — Z12.31 SCREENING MAMMOGRAM, ENCOUNTER FOR: ICD-10-CM

## 2018-05-16 PROCEDURE — 77067 SCR MAMMO BI INCL CAD: CPT | Mod: 26,,, | Performed by: RADIOLOGY

## 2018-05-16 PROCEDURE — 77063 BREAST TOMOSYNTHESIS BI: CPT | Mod: 26,,, | Performed by: RADIOLOGY

## 2018-05-16 PROCEDURE — 77067 SCR MAMMO BI INCL CAD: CPT | Mod: TC

## 2018-05-28 ENCOUNTER — OFFICE VISIT (OUTPATIENT)
Dept: URGENT CARE | Facility: CLINIC | Age: 43
End: 2018-05-28
Payer: MEDICAID

## 2018-05-28 VITALS
BODY MASS INDEX: 51.74 KG/M2 | OXYGEN SATURATION: 97 % | WEIGHT: 230 LBS | HEIGHT: 56 IN | RESPIRATION RATE: 18 BRPM | HEART RATE: 80 BPM | TEMPERATURE: 99 F | SYSTOLIC BLOOD PRESSURE: 148 MMHG | DIASTOLIC BLOOD PRESSURE: 84 MMHG

## 2018-05-28 DIAGNOSIS — J32.9 SINUSITIS, UNSPECIFIED CHRONICITY, UNSPECIFIED LOCATION: Primary | ICD-10-CM

## 2018-05-28 DIAGNOSIS — H61.21 IMPACTED CERUMEN OF RIGHT EAR: ICD-10-CM

## 2018-05-28 LAB
CTP QC/QA: YES
S PYO RRNA THROAT QL PROBE: NEGATIVE

## 2018-05-28 PROCEDURE — 87880 STREP A ASSAY W/OPTIC: CPT | Mod: QW,S$GLB,, | Performed by: PHYSICIAN ASSISTANT

## 2018-05-28 PROCEDURE — 99203 OFFICE O/P NEW LOW 30 MIN: CPT | Mod: S$GLB,,, | Performed by: PHYSICIAN ASSISTANT

## 2018-05-28 RX ORDER — AMOXICILLIN AND CLAVULANATE POTASSIUM 875; 125 MG/1; MG/1
1 TABLET, FILM COATED ORAL 2 TIMES DAILY
Qty: 14 TABLET | Refills: 0 | Status: SHIPPED | OUTPATIENT
Start: 2018-05-28 | End: 2018-06-04

## 2018-05-28 RX ORDER — NORETHINDRONE ACETATE AND ETHINYL ESTRADIOL AND FERROUS FUMARATE 1MG-20(21)
KIT ORAL
COMMUNITY
Start: 2018-05-02 | End: 2018-07-18

## 2018-05-28 RX ORDER — DEXAMETHASONE SODIUM PHOSPHATE 100 MG/10ML
5 INJECTION INTRAMUSCULAR; INTRAVENOUS
Status: COMPLETED | OUTPATIENT
Start: 2018-05-28 | End: 2018-05-28

## 2018-05-28 RX ORDER — AMOXICILLIN AND CLAVULANATE POTASSIUM 875; 125 MG/1; MG/1
1 TABLET, FILM COATED ORAL 2 TIMES DAILY
Qty: 14 TABLET | Refills: 0 | Status: SHIPPED | OUTPATIENT
Start: 2018-05-28 | End: 2018-05-28 | Stop reason: SDUPTHER

## 2018-05-28 RX ADMIN — DEXAMETHASONE SODIUM PHOSPHATE 5 MG: 100 INJECTION INTRAMUSCULAR; INTRAVENOUS at 02:05

## 2018-05-28 NOTE — PATIENT INSTRUCTIONS
-Please take antibiotic to completion.  -Below are suggestions for symptomatic relief:   -Tylenol every 4 hours OR ibuprofen every 6 hours as needed for pain/fever.   -Salt water gargles to soothe throat pain.   -Chloroseptic spray also helps to numb throat pain.   -Nasal saline spray reduces inflammation and dryness.   -Warm face compresses to help with facial sinus pain/pressure.   -Vicks vapor rub at night.   -Flonase OTC or Nasacort OTC for nasal congestion.   -Simple foods like chicken noodle soup.   -Delsym helps with coughing at night   -Zyrtec/Claritin during the day & Benadryl at night may help with allergies.     If you DO NOT have Hypertension or any history of palpitations, it is ok to take over the counter Sudafed or Mucinex D or Allegra-D or Claritin-D or Zyrtec-D.  If you do take one of the above, it is ok to combine that with plain over the counter Mucinex or Allegra or Claritin or Zyrtec. If, for example, you are taking Zyrtec -D, you can combine that with Mucinex, but not Mucinex-D.  If you are taking Mucinex-D, you can combine that with plain Allegra or Claritin or Zyrtec.   If you DO have Hypertension or palpitations, it is safe to take Coricidin HBP for relief of sinus symptoms.    Please follow up with your primary care provider within 2-5 days if your signs and symptoms have not resolved or worsen.     If your condition worsens or fails to improve we recommend that you receive another evaluation at the emergency room immediately or contact your primary medical clinic to discuss your concerns.   You must understand that you have received an Urgent Care treatment only and that you may be released before all of your medical problems are known or treated. You, the patient, will arrange for follow up care as instructed.

## 2018-05-28 NOTE — PROGRESS NOTES
"Subjective:       Patient ID: Sydnee Don is a 43 y.o. female.    Vitals:  height is 4' 8" (1.422 m) and weight is 104.3 kg (230 lb). Her temperature is 98.9 °F (37.2 °C). Her blood pressure is 148/84 (abnormal) and her pulse is 80. Her respiration is 18 and oxygen saturation is 97%.     Chief Complaint: Sore Throat    Sore Throat    This is a new problem. The current episode started in the past 7 days. The problem has been gradually worsening. The pain is worse on the right side. There has been no fever. The pain is at a severity of 6/10. The pain is moderate. Associated symptoms include congestion, coughing, ear pain, headaches, a plugged ear sensation and trouble swallowing. Pertinent negatives include no abdominal pain, hoarse voice or shortness of breath. Treatments tried: Ibuprofen  The treatment provided no relief.     Review of Systems   Constitution: Negative for chills, fever and malaise/fatigue.   HENT: Positive for congestion, ear pain, sore throat and trouble swallowing. Negative for hoarse voice.    Eyes: Negative for discharge and redness.   Cardiovascular: Negative for chest pain, dyspnea on exertion and leg swelling.   Respiratory: Positive for cough. Negative for shortness of breath, sputum production and wheezing.    Musculoskeletal: Positive for myalgias.   Gastrointestinal: Negative for abdominal pain and nausea.   Neurological: Positive for headaches.       Objective:      Physical Exam   Constitutional: She is oriented to person, place, and time. She appears well-developed and well-nourished. She does not appear ill. No distress.   HENT:   Head: Normocephalic and atraumatic.   Right Ear: Hearing, tympanic membrane, external ear and ear canal normal.   Left Ear: Hearing, tympanic membrane, external ear and ear canal normal.   Nose: Mucosal edema present. Right sinus exhibits maxillary sinus tenderness. Right sinus exhibits no frontal sinus tenderness. Left sinus exhibits maxillary sinus " tenderness. Left sinus exhibits no frontal sinus tenderness.   Mouth/Throat: Uvula is midline and oropharynx is clear and moist. No oropharyngeal exudate, posterior oropharyngeal edema or posterior oropharyngeal erythema.   Cerumen impaction of right ear - removed in clinic by MA with no complications.   Eyes: Conjunctivae, EOM and lids are normal. Right eye exhibits no discharge. Left eye exhibits no discharge.   Neck: Normal range of motion. Neck supple.   Cardiovascular: Normal rate, regular rhythm and normal heart sounds.  Exam reveals no gallop and no friction rub.    No murmur heard.  Pulmonary/Chest: Effort normal and breath sounds normal. No respiratory distress. She has no decreased breath sounds. She has no wheezes. She has no rhonchi. She has no rales.   Musculoskeletal: Normal range of motion.   Lymphadenopathy:        Head (right side): No submandibular and no tonsillar adenopathy present.        Head (left side): No submandibular and no tonsillar adenopathy present.   Neurological: She is alert and oriented to person, place, and time.   Skin: Skin is warm and dry. No rash noted. She is not diaphoretic. No erythema. No pallor.   Psychiatric: She has a normal mood and affect. Her behavior is normal.   Nursing note and vitals reviewed.      Assessment:       1. Sinusitis, unspecified chronicity, unspecified location    2. Impacted cerumen of right ear        Office Visit on 05/28/2018   Component Date Value Ref Range Status    Rapid Strep A Screen 05/28/2018 Negative  Negative Final     Acceptable 05/28/2018 Yes   Final     Plan:         Sinusitis, unspecified chronicity, unspecified location  -     POCT rapid strep A  -     dexamethasone injection 5 mg; Inject 0.5 mLs (5 mg total) into the muscle one time.  -     amoxicillin-clavulanate 875-125mg (AUGMENTIN) 875-125 mg per tablet; Take 1 tablet by mouth 2 (two) times daily.  Dispense: 14 tablet; Refill: 0    Impacted cerumen of right  ear  -     Ear wax removal      Patient Instructions   -Please take antibiotic to completion.  -Below are suggestions for symptomatic relief:   -Tylenol every 4 hours OR ibuprofen every 6 hours as needed for pain/fever.   -Salt water gargles to soothe throat pain.   -Chloroseptic spray also helps to numb throat pain.   -Nasal saline spray reduces inflammation and dryness.   -Warm face compresses to help with facial sinus pain/pressure.   -Vicks vapor rub at night.   -Flonase OTC or Nasacort OTC for nasal congestion.   -Simple foods like chicken noodle soup.   -Delsym helps with coughing at night   -Zyrtec/Claritin during the day & Benadryl at night may help with allergies.     If you DO NOT have Hypertension or any history of palpitations, it is ok to take over the counter Sudafed or Mucinex D or Allegra-D or Claritin-D or Zyrtec-D.  If you do take one of the above, it is ok to combine that with plain over the counter Mucinex or Allegra or Claritin or Zyrtec. If, for example, you are taking Zyrtec -D, you can combine that with Mucinex, but not Mucinex-D.  If you are taking Mucinex-D, you can combine that with plain Allegra or Claritin or Zyrtec.   If you DO have Hypertension or palpitations, it is safe to take Coricidin HBP for relief of sinus symptoms.    Please follow up with your primary care provider within 2-5 days if your signs and symptoms have not resolved or worsen.     If your condition worsens or fails to improve we recommend that you receive another evaluation at the emergency room immediately or contact your primary medical clinic to discuss your concerns.   You must understand that you have received an Urgent Care treatment only and that you may be released before all of your medical problems are known or treated. You, the patient, will arrange for follow up care as instructed.

## 2018-05-29 DIAGNOSIS — R10.9 ABDOMINAL CRAMPS: Primary | ICD-10-CM

## 2018-05-29 RX ORDER — IBUPROFEN 600 MG/1
600 TABLET ORAL EVERY 6 HOURS PRN
Qty: 30 TABLET | Refills: 0 | Status: SHIPPED | OUTPATIENT
Start: 2018-05-29 | End: 2019-08-01

## 2018-06-18 DIAGNOSIS — H93.12 TINNITUS OF LEFT EAR: Primary | ICD-10-CM

## 2018-06-20 ENCOUNTER — HOSPITAL ENCOUNTER (OUTPATIENT)
Dept: RADIOLOGY | Facility: HOSPITAL | Age: 43
Discharge: HOME | End: 2018-06-20
Attending: OTOLARYNGOLOGY
Payer: MEDICAID

## 2018-06-20 DIAGNOSIS — H93.12 TINNITUS OF LEFT EAR: ICD-10-CM

## 2018-06-20 PROCEDURE — 93970 EXTREMITY STUDY: CPT | Mod: TC

## 2018-06-20 PROCEDURE — 93970 EXTREMITY STUDY: CPT | Mod: 26,,, | Performed by: RADIOLOGY

## 2018-06-26 DIAGNOSIS — H93.A2 PULSATILE TINNITUS OF LEFT EAR: Primary | ICD-10-CM

## 2018-07-17 ENCOUNTER — HOSPITAL ENCOUNTER (OUTPATIENT)
Dept: RADIOLOGY | Facility: HOSPITAL | Age: 43
Discharge: HOME | End: 2018-07-17
Attending: OTOLARYNGOLOGY
Payer: MEDICAID

## 2018-07-17 DIAGNOSIS — H93.A2 PULSATILE TINNITUS OF LEFT EAR: ICD-10-CM

## 2018-07-17 PROCEDURE — 25500020 PHARM REV CODE 255: Performed by: OTOLARYNGOLOGY

## 2018-07-17 PROCEDURE — 70543 MRI ORBT/FAC/NCK W/O &W/DYE: CPT | Mod: TC

## 2018-07-17 PROCEDURE — 70543 MRI ORBT/FAC/NCK W/O &W/DYE: CPT | Mod: 26,,, | Performed by: RADIOLOGY

## 2018-07-17 PROCEDURE — A9585 GADOBUTROL INJECTION: HCPCS | Performed by: OTOLARYNGOLOGY

## 2018-07-17 RX ORDER — GADOBUTROL 604.72 MG/ML
10 INJECTION INTRAVENOUS
Status: COMPLETED | OUTPATIENT
Start: 2018-07-17 | End: 2018-07-17

## 2018-07-17 RX ADMIN — GADOBUTROL 10 ML: 604.72 INJECTION INTRAVENOUS at 05:07

## 2018-07-18 ENCOUNTER — OFFICE VISIT (OUTPATIENT)
Dept: OBSTETRICS AND GYNECOLOGY | Facility: CLINIC | Age: 43
End: 2018-07-18
Payer: MEDICAID

## 2018-07-18 VITALS
DIASTOLIC BLOOD PRESSURE: 78 MMHG | SYSTOLIC BLOOD PRESSURE: 132 MMHG | HEIGHT: 56 IN | BODY MASS INDEX: 53.26 KG/M2 | WEIGHT: 236.75 LBS

## 2018-07-18 DIAGNOSIS — Z01.419 ENCOUNTER FOR ANNUAL ROUTINE GYNECOLOGICAL EXAMINATION: Primary | ICD-10-CM

## 2018-07-18 DIAGNOSIS — Z12.4 PAP SMEAR FOR CERVICAL CANCER SCREENING: ICD-10-CM

## 2018-07-18 DIAGNOSIS — Z30.8 ENCOUNTER FOR OTHER CONTRACEPTIVE MANAGEMENT: ICD-10-CM

## 2018-07-18 DIAGNOSIS — Z11.3 SCREENING EXAMINATION FOR STD (SEXUALLY TRANSMITTED DISEASE): ICD-10-CM

## 2018-07-18 PROCEDURE — 88175 CYTOPATH C/V AUTO FLUID REDO: CPT

## 2018-07-18 PROCEDURE — 99999 PR PBB SHADOW E&M-EST. PATIENT-LVL II: CPT | Mod: PBBFAC,,, | Performed by: OBSTETRICS & GYNECOLOGY

## 2018-07-18 PROCEDURE — 99396 PREV VISIT EST AGE 40-64: CPT | Mod: S$PBB,,, | Performed by: OBSTETRICS & GYNECOLOGY

## 2018-07-18 PROCEDURE — 87491 CHLMYD TRACH DNA AMP PROBE: CPT

## 2018-07-18 PROCEDURE — 99212 OFFICE O/P EST SF 10 MIN: CPT | Mod: PBBFAC,PO | Performed by: OBSTETRICS & GYNECOLOGY

## 2018-07-18 PROCEDURE — 87624 HPV HI-RISK TYP POOLED RSLT: CPT

## 2018-07-18 RX ORDER — METRONIDAZOLE 10 MG/G
GEL TOPICAL
Refills: 2 | COMMUNITY
Start: 2018-06-05 | End: 2020-10-30

## 2018-07-18 RX ORDER — NORETHINDRONE ACETATE AND ETHINYL ESTRADIOL .03; 1.5 MG/1; MG/1
1 TABLET ORAL DAILY
Qty: 28 EACH | Refills: 11 | Status: SHIPPED | OUTPATIENT
Start: 2018-07-18 | End: 2019-06-28 | Stop reason: SDUPTHER

## 2018-07-18 NOTE — PROGRESS NOTES
"Chief Complaint   Patient presents with    Well Woman       HISTORY OF PRESENT ILLNESS:   Sydnee Don is a 43 y.o. female  who presents for well woman exam.  No LMP recorded..  She complains of  Decreased sex drive since on birth control. Cycles are "regular. Desires STD testing but declines blood work. Desires ocps for birth control but is consider BTL,       History reviewed. No pertinent past medical history.       Past Surgical History:   Procedure Laterality Date     SECTION           Social History     Social History    Marital status:      Spouse name: N/A    Number of children: N/A    Years of education: N/A     Occupational History    Not on file.     Social History Main Topics    Smoking status: Never Smoker    Smokeless tobacco: Never Used    Alcohol use No    Drug use: No    Sexual activity: Yes     Partners: Male     Other Topics Concern    Not on file     Social History Narrative    No narrative on file       Family History   Problem Relation Age of Onset    Hyperlipidemia Father     Hypertension Father     Hyperlipidemia Mother     Hypertension Mother     Arthritis Mother     Breast cancer Paternal Aunt          OB History    Para Term  AB Living   7 4 4   3 4   SAB TAB Ectopic Multiple Live Births                  # Outcome Date GA Lbr Gabino/2nd Weight Sex Delivery Anes PTL Lv   7 Term            6 Term            5 Term            4 Term            3 AB            2 AB            1 AB                 COMPREHENSIVE GYN HISTORY:  PAP History: Denies abnormal Paps.  Infection History: Denies STDs. Denies PID.  Benign History: Denies uterine fibroids. Denies ovarian cysts. Denies endometriosis.   Cancer History: Denies cervical cancer. Denies uterine cancer or hyperplasia. Denies ovarian cancer. Denies vulvar cancer or pre-cancer. Denies vaginal cancer or pre-cancer. Denies breast cancer. Denies colon cancer.  Sexual Activity History:   reports that she " "currently engages in sexual activity and has had male partners.   Menstrual History: Age of menarche:11 years. Every mon, flows for 5d days. moderate flow.  Dysmenorrhea History: Reports mild dysmenorrhea.  Contraception: none    ROS:  GENERAL: Denies weight gain or weight loss. Feeling well overall.   SKIN: Denies rash or lesions.   HEAD: Denies headache.   NODES: Denies enlarged lymph nodes.   CHEST: Denies shortness of breath.   ABDOMEN: No abdominal pain, constipation, diarrhea, nausea, vomiting or rectal bleeding.   URINARY: No frequency, dysuria, hematuria, or burning on urination.  REPRODUCTIVE: See HPI.   BREASTS: The patient denies pain, lumps, or nipple discharge.       /78   Ht 4' 8" (1.422 m)   Wt 107.4 kg (236 lb 12.4 oz)   BMI 53.08 kg/m²      APPEARANCE: Well nourished, well developed, in no acute distress.  NECK: Neck symmetric without  thyromegaly.  NODES: No inguinal, cervical lymph node enlargement.  CHEST: Lungs clear to auscultation.  HEART: Regular rate and rhythm, no murmurs, rubs or gallops.  ABDOMEN: Soft. No tenderness or masses. No hernias. No hepatosplenomegaly.   BREASTS: Symmetrical, no skin changes or visible lesions. No palpable masses, nipple discharge or adenopathy bilaterally.  PELVIC:   VULVA: No lesions. Normal female genitalia.  URETHRAL MEATUS: Normal size and location, no lesions, no prolapse.  URETHRA: No masses, tenderness, prolapse or scarring.  VAGINA: Moist and well rugated, no discharge, no significant cystocele or rectocele.  CERVIX: No lesions and discharge.  UTERUS: Normal size, regular shape, mobile, non-tender, bladder base nontender.  ADNEXA: No masses or tenderness.      1. Encounter for annual routine gynecological examination    2. Pap smear for cervical cancer screening    3. Screening examination for STD (sexually transmitted disease)    4. Encounter for other contraceptive management        Plan:  1. Routine gyn annual  s/p normal breast exam and MMG " ordered.   Pap with HPV cotesting ordered   STD testing: GC/CT ordered but syphilis, HBV/HCV and HIV declined  Counseled on contraception and desires ocps but is considering BTL so medicaid paper signed today in case she decides to have it.       F/u in 1 yr or RPN

## 2018-07-21 LAB
C TRACH DNA SPEC QL NAA+PROBE: NOT DETECTED
N GONORRHOEA DNA SPEC QL NAA+PROBE: NOT DETECTED

## 2018-07-23 ENCOUNTER — PATIENT MESSAGE (OUTPATIENT)
Dept: OBSTETRICS AND GYNECOLOGY | Facility: CLINIC | Age: 43
End: 2018-07-23

## 2018-07-26 LAB
HPV HR 12 DNA CVX QL NAA+PROBE: NEGATIVE
HPV16 AG SPEC QL: NEGATIVE
HPV18 DNA SPEC QL NAA+PROBE: NEGATIVE

## 2018-07-30 ENCOUNTER — PATIENT MESSAGE (OUTPATIENT)
Dept: OBSTETRICS AND GYNECOLOGY | Facility: CLINIC | Age: 43
End: 2018-07-30

## 2019-06-28 ENCOUNTER — PATIENT MESSAGE (OUTPATIENT)
Dept: OBSTETRICS AND GYNECOLOGY | Facility: CLINIC | Age: 44
End: 2019-06-28

## 2019-06-28 RX ORDER — NORETHINDRONE ACETATE AND ETHINYL ESTRADIOL .03; 1.5 MG/1; MG/1
1 TABLET ORAL DAILY
Qty: 28 EACH | Refills: 1 | Status: SHIPPED | OUTPATIENT
Start: 2019-06-28 | End: 2019-08-20 | Stop reason: SDUPTHER

## 2019-06-29 ENCOUNTER — PATIENT MESSAGE (OUTPATIENT)
Dept: OBSTETRICS AND GYNECOLOGY | Facility: CLINIC | Age: 44
End: 2019-06-29

## 2019-07-02 ENCOUNTER — PATIENT MESSAGE (OUTPATIENT)
Dept: OBSTETRICS AND GYNECOLOGY | Facility: CLINIC | Age: 44
End: 2019-07-02

## 2019-07-22 ENCOUNTER — PATIENT MESSAGE (OUTPATIENT)
Dept: OBSTETRICS AND GYNECOLOGY | Facility: CLINIC | Age: 44
End: 2019-07-22

## 2019-08-01 ENCOUNTER — PATIENT MESSAGE (OUTPATIENT)
Dept: OBSTETRICS AND GYNECOLOGY | Facility: CLINIC | Age: 44
End: 2019-08-01

## 2019-08-01 ENCOUNTER — TELEPHONE (OUTPATIENT)
Dept: OBSTETRICS AND GYNECOLOGY | Facility: CLINIC | Age: 44
End: 2019-08-01

## 2019-08-01 ENCOUNTER — OFFICE VISIT (OUTPATIENT)
Dept: OBSTETRICS AND GYNECOLOGY | Facility: CLINIC | Age: 44
End: 2019-08-01
Payer: MEDICAID

## 2019-08-01 VITALS
BODY MASS INDEX: 55.79 KG/M2 | DIASTOLIC BLOOD PRESSURE: 80 MMHG | HEIGHT: 56 IN | WEIGHT: 248 LBS | SYSTOLIC BLOOD PRESSURE: 128 MMHG

## 2019-08-01 DIAGNOSIS — Z01.419 ENCOUNTER FOR ANNUAL ROUTINE GYNECOLOGICAL EXAMINATION: Primary | ICD-10-CM

## 2019-08-01 DIAGNOSIS — N89.8 VAGINAL ODOR: ICD-10-CM

## 2019-08-01 DIAGNOSIS — R39.15 URINARY URGENCY: ICD-10-CM

## 2019-08-01 DIAGNOSIS — Z12.31 SCREENING MAMMOGRAM, ENCOUNTER FOR: ICD-10-CM

## 2019-08-01 DIAGNOSIS — N39.3 STRESS INCONTINENCE: ICD-10-CM

## 2019-08-01 PROCEDURE — 99396 PREV VISIT EST AGE 40-64: CPT | Mod: S$PBB,,, | Performed by: OBSTETRICS & GYNECOLOGY

## 2019-08-01 PROCEDURE — 99396 PR PREVENTIVE VISIT,EST,40-64: ICD-10-PCS | Mod: S$PBB,,, | Performed by: OBSTETRICS & GYNECOLOGY

## 2019-08-01 PROCEDURE — 87661 TRICHOMONAS VAGINALIS AMPLIF: CPT

## 2019-08-01 PROCEDURE — 81001 URINALYSIS AUTO W/SCOPE: CPT

## 2019-08-01 PROCEDURE — 99213 OFFICE O/P EST LOW 20 MIN: CPT | Mod: PBBFAC,PO | Performed by: OBSTETRICS & GYNECOLOGY

## 2019-08-01 PROCEDURE — 87801 DETECT AGNT MULT DNA AMPLI: CPT

## 2019-08-01 PROCEDURE — 99999 PR PBB SHADOW E&M-EST. PATIENT-LVL III: CPT | Mod: PBBFAC,,, | Performed by: OBSTETRICS & GYNECOLOGY

## 2019-08-01 PROCEDURE — 99999 PR PBB SHADOW E&M-EST. PATIENT-LVL III: ICD-10-PCS | Mod: PBBFAC,,, | Performed by: OBSTETRICS & GYNECOLOGY

## 2019-08-01 PROCEDURE — 87481 CANDIDA DNA AMP PROBE: CPT | Mod: 59

## 2019-08-01 NOTE — TELEPHONE ENCOUNTER
Called to reschedule pt appt due to Dr Washington leaving to go a delivery at the hospital. Pt did not answer. Left v/m to call clinic.

## 2019-08-01 NOTE — PROGRESS NOTES
"Chief Complaint   Patient presents with    Well Woman       HISTORY OF PRESENT ILLNESS:   Sydnee Don is a 44 y.o. female  who presents for well woman exam.  Patient's last menstrual period was 2019..  She complains of  Constant urinary leakage. Cycles are "regular. Desires STD testing but declines blood work. Desires ocps for birth control. She c/o urinary leaking. She reports some urge where she feels the need to go and has to run to the restroom and may leak a little. She goes multiple times a day and 2 times at night. Her most bothersome complaint is a constant urinary leakage that is worsened if she coughs or laughs or walks.      History reviewed. No pertinent past medical history.       Past Surgical History:   Procedure Laterality Date     SECTION           Social History     Socioeconomic History    Marital status:      Spouse name: Not on file    Number of children: Not on file    Years of education: Not on file    Highest education level: Not on file   Occupational History    Not on file   Social Needs    Financial resource strain: Not on file    Food insecurity:     Worry: Not on file     Inability: Not on file    Transportation needs:     Medical: Not on file     Non-medical: Not on file   Tobacco Use    Smoking status: Never Smoker    Smokeless tobacco: Never Used   Substance and Sexual Activity    Alcohol use: No    Drug use: No    Sexual activity: Yes     Partners: Male     Birth control/protection: OCP   Lifestyle    Physical activity:     Days per week: Not on file     Minutes per session: Not on file    Stress: Not on file   Relationships    Social connections:     Talks on phone: Not on file     Gets together: Not on file     Attends Spiritism service: Not on file     Active member of club or organization: Not on file     Attends meetings of clubs or organizations: Not on file     Relationship status: Not on file   Other Topics Concern    Not on file " "  Social History Narrative    Not on file       Family History   Problem Relation Age of Onset    Hyperlipidemia Father     Hypertension Father     Hyperlipidemia Mother     Hypertension Mother     Arthritis Mother     Breast cancer Paternal Aunt          OB History    Para Term  AB Living   7 4 4 0 3 4   SAB TAB Ectopic Multiple Live Births   2       4      # Outcome Date GA Lbr Gabino/2nd Weight Sex Delivery Anes PTL Lv   7 SAB            6 Term     M CS-Unspec  N IVONNE   5 Term     M CS-Unspec  N IVONNE   4 SAB            3 Term     F   Y IVONNE   2 Term     F CS-Unspec  Y IVONNE   1 AB      SAB        COMPREHENSIVE GYN HISTORY:  PAP History: Denies abnormal Paps. 2018 NILM/HPV -  Infection History: Denies STDs. Denies PID.  Benign History: Denies uterine fibroids. Denies ovarian cysts. Denies endometriosis.   Cancer History: Denies cervical cancer. Denies uterine cancer or hyperplasia. Denies ovarian cancer. Denies vulvar cancer or pre-cancer. Denies vaginal cancer or pre-cancer. Denies breast cancer. Denies colon cancer.  Sexual Activity History:   reports that she currently engages in sexual activity and has had male partners.   Menstrual History: Age of menarche:11 years. Every mon, flows for 5d days. moderate flow.  Dysmenorrhea History: Reports mild dysmenorrhea.  Contraception: none    ROS:  GENERAL: Denies weight gain or weight loss. Feeling well overall.   SKIN: Denies rash or lesions.   HEAD: Denies headache.   NODES: Denies enlarged lymph nodes.   CHEST: Denies shortness of breath.   ABDOMEN: No abdominal pain, constipation, diarrhea, nausea, vomiting or rectal bleeding.   URINARY: No frequency, dysuria, hematuria, or burning on urination.  REPRODUCTIVE: See HPI.   BREASTS: The patient denies pain, lumps, or nipple discharge.       /80   Ht 4' 8" (1.422 m)   Wt 112.5 kg (248 lb 0.3 oz)   LMP 2019   BMI 55.60 kg/m²      APPEARANCE: Well nourished, well developed, in no acute " distress.  NECK: Neck symmetric without  thyromegaly.  NODES: No inguinal, cervical lymph node enlargement.  CHEST: Lungs clear to auscultation.  HEART: Regular rate and rhythm, no murmurs, rubs or gallops.  ABDOMEN: Soft. No tenderness or masses. No hernias. No hepatosplenomegaly. obese  BREASTS: Symmetrical, no skin changes or visible lesions. No palpable masses, nipple discharge or adenopathy bilaterally.  PELVIC:   VULVA: No lesions. Normal female genitalia.  URETHRAL MEATUS: Normal size and location, no lesions, no prolapse. + cough stress test  URETHRA: No masses, tenderness, prolapse or scarring.  VAGINA: Moist and well rugated, no discharge, no significant cystocele or rectocele.  CERVIX: No lesions and discharge.  UTERUS: Normal size, regular shape, mobile, non-tender, bladder base nontender.  ADNEXA: No masses or tenderness.    PVR: 50 cc    1. Encounter for annual routine gynecological examination    2. Urinary urgency    3. Screening mammogram, encounter for    4. Stress incontinence        Plan:  1. Routine gyn annual  s/p normal breast exam and MMG ordered.   Pap with HPV cotesting ordered   STD testing: GC/CT ordered but syphilis, HBV/HCV and HIV declined  Counseled on contraception and desires ocps, will refill.   2. Sounds like a mix of stress and urge incontinence with stress being her biggest complaint. We discussed life style modifications like weight loss and kegels and leaning over after urination to empty bladder fully. Her PVR is normal, will send for UA. We discussed treatment is most often surgery with sling. She would like to meet with uro-gyn to get more information about that because this is starting to affect her daily life. Sent referral to [facility] uro-gyn because she doesn't want to go to [facility].        F/u in 1 yr or RPN

## 2019-08-01 NOTE — LETTER
2019    [facility]             [facility]  [address] [place] [address]  Phone: [phone]   Patient: Sydnee Don   MR Number: [id number]   YOB: 1975   Date of Visit: 2019       Dear [facility] uro-gynecology:    I am referring my patient, Sydnee Don, to you for evaluation of mixed stress and urge incontience.    She  has no past medical history on file. Her  has a past surgical history that includes  section. She  reports that she has never smoked. She has never used smokeless tobacco. She reports that she does not drink alcohol or use drugs.    She has a current medication list which includes the following prescription(s): metronidazole 1% and norethindrone ac-eth estradiol. She has No Known Allergies.     Her PVR is normal. I did a cath urinalysis on her today. She has a positive cough stress test and is interested in possible mid urethral sling.     I appreciate your assistance in her care and look forward to your findings and recommendations.    Sincerely,                           Teodora Washington MD

## 2019-08-02 ENCOUNTER — HOSPITAL ENCOUNTER (OUTPATIENT)
Dept: RADIOLOGY | Facility: HOSPITAL | Age: 44
Discharge: HOME | End: 2019-08-02
Attending: OBSTETRICS & GYNECOLOGY
Payer: MEDICAID

## 2019-08-02 DIAGNOSIS — Z12.31 SCREENING MAMMOGRAM, ENCOUNTER FOR: ICD-10-CM

## 2019-08-02 DIAGNOSIS — Z01.419 ENCOUNTER FOR ANNUAL ROUTINE GYNECOLOGICAL EXAMINATION: ICD-10-CM

## 2019-08-02 LAB
BACTERIA #/AREA URNS AUTO: NORMAL /HPF
BILIRUB UR QL STRIP: NEGATIVE
CLARITY UR REFRACT.AUTO: ABNORMAL
COLOR UR AUTO: YELLOW
GLUCOSE UR QL STRIP: NEGATIVE
HGB UR QL STRIP: ABNORMAL
KETONES UR QL STRIP: NEGATIVE
LEUKOCYTE ESTERASE UR QL STRIP: NEGATIVE
MICROSCOPIC COMMENT: NORMAL
NITRITE UR QL STRIP: NEGATIVE
PH UR STRIP: 5 [PH] (ref 5–8)
PROT UR QL STRIP: NEGATIVE
RBC #/AREA URNS AUTO: 0 /HPF (ref 0–4)
SP GR UR STRIP: 1.02 (ref 1–1.03)
SQUAMOUS #/AREA URNS AUTO: 1 /HPF
URN SPEC COLLECT METH UR: ABNORMAL
WBC #/AREA URNS AUTO: 0 /HPF (ref 0–5)

## 2019-08-02 PROCEDURE — 77063 BREAST TOMOSYNTHESIS BI: CPT | Mod: 26,,, | Performed by: RADIOLOGY

## 2019-08-02 PROCEDURE — 77067 SCR MAMMO BI INCL CAD: CPT | Mod: 26,,, | Performed by: RADIOLOGY

## 2019-08-02 PROCEDURE — 77067 MAMMO DIGITAL SCREENING BILAT WITH TOMOSYNTHESIS_CAD: ICD-10-PCS | Mod: 26,,, | Performed by: RADIOLOGY

## 2019-08-02 PROCEDURE — 77067 SCR MAMMO BI INCL CAD: CPT | Mod: TC

## 2019-08-02 PROCEDURE — 77063 MAMMO DIGITAL SCREENING BILAT WITH TOMOSYNTHESIS_CAD: ICD-10-PCS | Mod: 26,,, | Performed by: RADIOLOGY

## 2019-08-03 LAB
BACTERIAL VAGINOSIS DNA: NEGATIVE
CANDIDA GLABRATA DNA: NORMAL
CANDIDA KRUSEI DNA: NORMAL
CANDIDA RRNA VAG QL PROBE: NORMAL
T VAGINALIS RRNA GENITAL QL PROBE: NORMAL

## 2019-08-05 ENCOUNTER — PATIENT MESSAGE (OUTPATIENT)
Dept: OBSTETRICS AND GYNECOLOGY | Facility: CLINIC | Age: 44
End: 2019-08-05

## 2019-08-20 ENCOUNTER — PATIENT MESSAGE (OUTPATIENT)
Dept: OBSTETRICS AND GYNECOLOGY | Facility: CLINIC | Age: 44
End: 2019-08-20

## 2019-08-20 PROBLEM — Z32.01 POSITIVE PREGNANCY TEST: Status: RESOLVED | Noted: 2018-04-19 | Resolved: 2019-08-20

## 2019-08-20 PROBLEM — O03.9 COMPLETE ABORTION: Status: RESOLVED | Noted: 2018-04-23 | Resolved: 2019-08-20

## 2019-08-20 RX ORDER — NORETHINDRONE ACETATE AND ETHINYL ESTRADIOL .03; 1.5 MG/1; MG/1
1 TABLET ORAL DAILY
Qty: 28 EACH | Refills: 11 | Status: SHIPPED | OUTPATIENT
Start: 2019-08-20 | End: 2021-01-28

## 2019-08-21 ENCOUNTER — PATIENT MESSAGE (OUTPATIENT)
Dept: OBSTETRICS AND GYNECOLOGY | Facility: CLINIC | Age: 44
End: 2019-08-21

## 2019-08-21 RX ORDER — NORETHINDRONE ACETATE AND ETHINYL ESTRADIOL 1MG-20(21)
1 KIT ORAL DAILY
Qty: 30 TABLET | Refills: 11 | Status: SHIPPED | OUTPATIENT
Start: 2019-08-21 | End: 2020-07-23 | Stop reason: SDUPTHER

## 2020-07-22 ENCOUNTER — PATIENT MESSAGE (OUTPATIENT)
Dept: OBSTETRICS AND GYNECOLOGY | Facility: CLINIC | Age: 45
End: 2020-07-22

## 2020-07-23 RX ORDER — NORETHINDRONE ACETATE AND ETHINYL ESTRADIOL 1MG-20(21)
1 KIT ORAL DAILY
Qty: 84 TABLET | Refills: 0 | Status: SHIPPED | OUTPATIENT
Start: 2020-07-23 | End: 2020-10-30 | Stop reason: SDUPTHER

## 2020-10-15 ENCOUNTER — PATIENT MESSAGE (OUTPATIENT)
Dept: OBSTETRICS AND GYNECOLOGY | Facility: CLINIC | Age: 45
End: 2020-10-15

## 2020-10-16 ENCOUNTER — TELEPHONE (OUTPATIENT)
Dept: OBSTETRICS AND GYNECOLOGY | Facility: CLINIC | Age: 45
End: 2020-10-16

## 2020-10-16 NOTE — TELEPHONE ENCOUNTER
----- Message from Hermelinda More sent at 10/16/2020  1:25 PM CDT -----  Type:  Sooner Apoointment Request    Caller is requesting a sooner appointment.  Caller declined first available appointment listed below.  Caller will not accept being placed on the waitlist and is requesting a message be sent to doctor.  Name of Caller:Patient  When is the first available appointment?11/19  Symptoms:Out of Birth control  Would the patient rather a call back or a response via [facility]?  call  Best Call Back Number: [phone]  Additional Information:         Type:  Mammogram    Caller is requesting to schedule their annual mammogram appointment.  Order is not listed in EPIC.  Please enter order and contact patient to schedule.  Name of Caller:Patient  Where would they like the mammogram performed? [facility]  Would the patient rather a call back or a response via [facility]?  Call   Best Call Back Number: [phone]  Additional Information:  Patient says she received a letter

## 2020-10-30 ENCOUNTER — OFFICE VISIT (OUTPATIENT)
Dept: OBSTETRICS AND GYNECOLOGY | Facility: CLINIC | Age: 45
End: 2020-10-30
Payer: MEDICAID

## 2020-10-30 ENCOUNTER — TELEPHONE (OUTPATIENT)
Dept: UROGYNECOLOGY | Facility: CLINIC | Age: 45
End: 2020-10-30

## 2020-10-30 ENCOUNTER — PATIENT MESSAGE (OUTPATIENT)
Dept: OBSTETRICS AND GYNECOLOGY | Facility: CLINIC | Age: 45
End: 2020-10-30

## 2020-10-30 VITALS
HEIGHT: 56 IN | DIASTOLIC BLOOD PRESSURE: 60 MMHG | SYSTOLIC BLOOD PRESSURE: 120 MMHG | BODY MASS INDEX: 54.13 KG/M2 | WEIGHT: 240.63 LBS

## 2020-10-30 DIAGNOSIS — Z12.31 SCREENING MAMMOGRAM, ENCOUNTER FOR: ICD-10-CM

## 2020-10-30 DIAGNOSIS — N39.45 CONTINUOUS LEAKAGE OF URINE: ICD-10-CM

## 2020-10-30 DIAGNOSIS — Z01.419 ENCOUNTER FOR ANNUAL ROUTINE GYNECOLOGICAL EXAMINATION: Primary | ICD-10-CM

## 2020-10-30 DIAGNOSIS — Z30.41 ENCOUNTER FOR SURVEILLANCE OF CONTRACEPTIVE PILLS: ICD-10-CM

## 2020-10-30 PROCEDURE — 99213 OFFICE O/P EST LOW 20 MIN: CPT | Mod: PBBFAC,PO | Performed by: OBSTETRICS & GYNECOLOGY

## 2020-10-30 PROCEDURE — 99999 PR PBB SHADOW E&M-EST. PATIENT-LVL III: ICD-10-PCS | Mod: PBBFAC,,, | Performed by: OBSTETRICS & GYNECOLOGY

## 2020-10-30 PROCEDURE — 99396 PREV VISIT EST AGE 40-64: CPT | Mod: S$PBB,,, | Performed by: OBSTETRICS & GYNECOLOGY

## 2020-10-30 PROCEDURE — 99999 PR PBB SHADOW E&M-EST. PATIENT-LVL III: CPT | Mod: PBBFAC,,, | Performed by: OBSTETRICS & GYNECOLOGY

## 2020-10-30 PROCEDURE — 99396 PR PREVENTIVE VISIT,EST,40-64: ICD-10-PCS | Mod: S$PBB,,, | Performed by: OBSTETRICS & GYNECOLOGY

## 2020-10-30 RX ORDER — NORETHINDRONE ACETATE AND ETHINYL ESTRADIOL 1MG-20(21)
1 KIT ORAL DAILY
Qty: 84 TABLET | Refills: 4 | Status: SHIPPED | OUTPATIENT
Start: 2020-10-30 | End: 2021-01-28

## 2020-10-30 NOTE — TELEPHONE ENCOUNTER
----- Message from Aster Guadarrama sent at 10/30/2020  2:57 PM CDT -----  Patient has a referral to Urogynecology, can you assist in scheduling

## 2020-10-30 NOTE — PROGRESS NOTES
"Chief Complaint   Patient presents with    Well Woman       HISTORY OF PRESENT ILLNESS:   Sydnee Don is a 45 y.o. female  who presents for well woman exam.  No LMP recorded..  She complains of  Constant urinary leakage. Cycles are "regular. Desires STD testing but declines blood work. Desires ocps for birth control. She c/o urinary leaking. She reports some urge where she feels the need to go and has to run to the restroom and may leak a little. She goes multiple times a day and 2 times at night. Her most bothersome complaint is a constant urinary leakage that is worsened if she coughs or laughs or walks. I had referred her to uro-gyn and she did not go but is interested in being evaluated now.      History reviewed. No pertinent past medical history.       Past Surgical History:   Procedure Laterality Date     SECTION           Social History     Socioeconomic History    Marital status:      Spouse name: Not on file    Number of children: Not on file    Years of education: Not on file    Highest education level: Not on file   Occupational History    Not on file   Social Needs    Financial resource strain: Not on file    Food insecurity     Worry: Not on file     Inability: Not on file    Transportation needs     Medical: Not on file     Non-medical: Not on file   Tobacco Use    Smoking status: Never Smoker    Smokeless tobacco: Never Used   Substance and Sexual Activity    Alcohol use: No    Drug use: No    Sexual activity: Yes     Partners: Male     Birth control/protection: OCP   Lifestyle    Physical activity     Days per week: Not on file     Minutes per session: Not on file    Stress: Not on file   Relationships    Social connections     Talks on phone: Not on file     Gets together: Not on file     Attends Shinto service: Not on file     Active member of club or organization: Not on file     Attends meetings of clubs or organizations: Not on file     Relationship " status: Not on file   Other Topics Concern    Not on file   Social History Narrative    Not on file       Family History   Problem Relation Age of Onset    Hyperlipidemia Father     Hypertension Father     Hyperlipidemia Mother     Hypertension Mother     Arthritis Mother     Breast cancer Paternal Aunt          OB History    Para Term  AB Living   7 4 4 0 3 4   SAB TAB Ectopic Multiple Live Births   2       4      # Outcome Date GA Lbr Gabino/2nd Weight Sex Delivery Anes PTL Lv   7 SAB            6 Term     M CS-Unspec  N IVONNE   5 Term     M CS-Unspec  N IVONNE   4 SAB            3 Term     F   Y IVONNE   2 Term     F CS-Unspec  Y IVONNE   1 AB      SAB        COMPREHENSIVE GYN HISTORY:  PAP History: Denies abnormal Paps. 2018 NILM/HPV -  Infection History: Denies STDs. Denies PID.  Benign History: Denies uterine fibroids. Denies ovarian cysts. Denies endometriosis.   Cancer History: Denies cervical cancer. Denies uterine cancer or hyperplasia. Denies ovarian cancer. Denies vulvar cancer or pre-cancer. Denies vaginal cancer or pre-cancer. Denies breast cancer. Denies colon cancer.  Sexual Activity History:   reports that she currently engages in sexual activity and has had male partners.   Menstrual History: Age of menarche:11 years. Every mon, flows for 5d days. moderate flow.  Dysmenorrhea History: Reports mild dysmenorrhea.  Contraception: none    ROS:  GENERAL: Denies weight gain or weight loss. Feeling well overall.   SKIN: Denies rash or lesions.   HEAD: Denies headache.   NODES: Denies enlarged lymph nodes.   CHEST: Denies shortness of breath.   ABDOMEN: No abdominal pain, constipation, diarrhea, nausea, vomiting or rectal bleeding.   URINARY: No frequency, dysuria, hematuria, or burning on urination.  REPRODUCTIVE: See HPI.   BREASTS: The patient denies pain, lumps, or nipple discharge.       There were no vitals taken for this visit.     APPEARANCE: Well nourished, well developed, in no  acute distress.  NECK: Neck symmetric without  thyromegaly.  NODES: No inguinal, cervical lymph node enlargement.  CHEST: Lungs clear to auscultation.  HEART: Regular rate and rhythm, no murmurs, rubs or gallops.  ABDOMEN: Soft. No tenderness or masses. No hernias. No hepatosplenomegaly. obese  BREASTS: Symmetrical, no skin changes or visible lesions. No palpable masses, nipple discharge or adenopathy bilaterally.  PELVIC:   VULVA: No lesions. Normal female genitalia.  URETHRAL MEATUS: Normal size and location, no lesions, no prolapse. + cough stress test  URETHRA: No masses, tenderness, prolapse or scarring.  VAGINA: Moist and well rugated, no discharge, no significant cystocele or rectocele.  CERVIX: No lesions and discharge.  UTERUS: Normal size, regular shape, mobile, non-tender, bladder base nontender.  ADNEXA: No masses or tenderness.        1. Encounter for annual routine gynecological examination    2. Encounter for surveillance of contraceptive pills        Plan:  1. Routine gyn annual  s/p normal breast exam and MMG ordered.   Pap with HPV cotesting up to date, next needed 2023   STD testing: GC/CT ordered but syphilis, HBV/HCV and HIV declined  Counseled on contraception and desires ocps, will refill.   2. Sounds like a mix of stress and urge incontinence with stress being her biggest complaint. We discussed life style modifications like weight loss and kegels and leaning over after urination to empty bladder fully. Her PVR is normal, will send for UA. We discussed treatment is most often surgery with sling. She would like to meet with uro-gyn to get more information about that because this is starting to affect her daily life. Sent referral to [facility] uro-gyn because she doesn't want to go to [facility].    -may want to do BTL at same time as       F/u in 1 yr or RPN

## 2021-01-09 ENCOUNTER — OFFICE VISIT (OUTPATIENT)
Dept: URGENT CARE | Facility: CLINIC | Age: 46
End: 2021-01-09
Payer: MEDICAID

## 2021-01-09 VITALS
OXYGEN SATURATION: 98 % | TEMPERATURE: 99 F | BODY MASS INDEX: 53.99 KG/M2 | HEART RATE: 77 BPM | DIASTOLIC BLOOD PRESSURE: 87 MMHG | RESPIRATION RATE: 18 BRPM | SYSTOLIC BLOOD PRESSURE: 149 MMHG | HEIGHT: 56 IN | WEIGHT: 240 LBS

## 2021-01-09 DIAGNOSIS — M54.2 MUSCULOSKELETAL NECK PAIN: Primary | ICD-10-CM

## 2021-01-09 PROCEDURE — 99214 OFFICE O/P EST MOD 30 MIN: CPT | Mod: S$GLB,,, | Performed by: NURSE PRACTITIONER

## 2021-01-09 PROCEDURE — 99214 PR OFFICE/OUTPT VISIT, EST, LEVL IV, 30-39 MIN: ICD-10-PCS | Mod: S$GLB,,, | Performed by: NURSE PRACTITIONER

## 2021-01-09 RX ORDER — KETOROLAC TROMETHAMINE 30 MG/ML
30 INJECTION, SOLUTION INTRAMUSCULAR; INTRAVENOUS
Status: COMPLETED | OUTPATIENT
Start: 2021-01-09 | End: 2021-01-09

## 2021-01-09 RX ORDER — METHOCARBAMOL 500 MG/1
500 TABLET, FILM COATED ORAL 4 TIMES DAILY
Qty: 40 TABLET | Refills: 0 | Status: SHIPPED | OUTPATIENT
Start: 2021-01-09 | End: 2021-01-19

## 2021-01-09 RX ADMIN — KETOROLAC TROMETHAMINE 30 MG: 30 INJECTION, SOLUTION INTRAMUSCULAR; INTRAVENOUS at 06:01

## 2021-01-11 ENCOUNTER — TELEPHONE (OUTPATIENT)
Dept: URGENT CARE | Facility: CLINIC | Age: 46
End: 2021-01-11

## 2021-01-25 ENCOUNTER — PATIENT MESSAGE (OUTPATIENT)
Dept: ORTHOPEDICS | Facility: CLINIC | Age: 46
End: 2021-01-25

## 2021-01-25 ENCOUNTER — HOSPITAL ENCOUNTER (OUTPATIENT)
Dept: RADIOLOGY | Facility: HOSPITAL | Age: 46
Discharge: HOME | End: 2021-01-25
Attending: ORTHOPAEDIC SURGERY
Payer: MEDICAID

## 2021-01-25 DIAGNOSIS — M50.30 DDD (DEGENERATIVE DISC DISEASE), CERVICAL: ICD-10-CM

## 2021-01-25 PROCEDURE — 72050 X-RAY EXAM NECK SPINE 4/5VWS: CPT | Mod: 26,,, | Performed by: RADIOLOGY

## 2021-01-25 PROCEDURE — 72050 XR CERVICAL SPINE AP LAT WITH FLEX EXTEN: ICD-10-PCS | Mod: 26,,, | Performed by: RADIOLOGY

## 2021-01-25 PROCEDURE — 72050 X-RAY EXAM NECK SPINE 4/5VWS: CPT | Mod: TC,FY

## 2021-01-28 ENCOUNTER — OFFICE VISIT (OUTPATIENT)
Dept: ORTHOPEDICS | Facility: CLINIC | Age: 46
End: 2021-01-28
Payer: MEDICAID

## 2021-01-28 VITALS — BODY MASS INDEX: 56.19 KG/M2 | HEIGHT: 56 IN | WEIGHT: 249.81 LBS

## 2021-01-28 DIAGNOSIS — M54.12 CERVICAL RADICULOPATHY: Primary | ICD-10-CM

## 2021-01-28 PROCEDURE — 99204 OFFICE O/P NEW MOD 45 MIN: CPT | Mod: S$PBB,,, | Performed by: ORTHOPAEDIC SURGERY

## 2021-01-28 PROCEDURE — 99999 PR PBB SHADOW E&M-EST. PATIENT-LVL III: ICD-10-PCS | Mod: PBBFAC,,, | Performed by: ORTHOPAEDIC SURGERY

## 2021-01-28 PROCEDURE — 99213 OFFICE O/P EST LOW 20 MIN: CPT | Mod: PBBFAC | Performed by: ORTHOPAEDIC SURGERY

## 2021-01-28 PROCEDURE — 99999 PR PBB SHADOW E&M-EST. PATIENT-LVL III: CPT | Mod: PBBFAC,,, | Performed by: ORTHOPAEDIC SURGERY

## 2021-01-28 PROCEDURE — 99204 PR OFFICE/OUTPT VISIT, NEW, LEVL IV, 45-59 MIN: ICD-10-PCS | Mod: S$PBB,,, | Performed by: ORTHOPAEDIC SURGERY

## 2021-01-28 RX ORDER — CYCLOBENZAPRINE HCL 5 MG
5 TABLET ORAL 3 TIMES DAILY PRN
Qty: 30 TABLET | Refills: 0 | Status: SHIPPED | OUTPATIENT
Start: 2021-01-28 | End: 2021-02-07

## 2021-01-28 RX ORDER — METHYLPREDNISOLONE 4 MG/1
TABLET ORAL
Qty: 1 PACKAGE | Refills: 0 | Status: SHIPPED | OUTPATIENT
Start: 2021-01-28 | End: 2021-02-18

## 2021-02-04 ENCOUNTER — PATIENT MESSAGE (OUTPATIENT)
Dept: ORTHOPEDICS | Facility: CLINIC | Age: 46
End: 2021-02-04

## 2021-02-04 DIAGNOSIS — M54.12 CERVICAL RADICULOPATHY: Primary | ICD-10-CM

## 2021-02-05 ENCOUNTER — PATIENT MESSAGE (OUTPATIENT)
Dept: ORTHOPEDICS | Facility: CLINIC | Age: 46
End: 2021-02-05

## 2021-02-11 ENCOUNTER — PATIENT MESSAGE (OUTPATIENT)
Dept: ORTHOPEDICS | Facility: CLINIC | Age: 46
End: 2021-02-11

## 2021-02-12 ENCOUNTER — PATIENT MESSAGE (OUTPATIENT)
Dept: ORTHOPEDICS | Facility: CLINIC | Age: 46
End: 2021-02-12

## 2021-02-12 DIAGNOSIS — M54.12 CERVICAL RADICULOPATHY: Primary | ICD-10-CM

## 2021-02-24 ENCOUNTER — CLINICAL SUPPORT (OUTPATIENT)
Dept: REHABILITATION | Facility: HOSPITAL | Age: 46
End: 2021-02-24
Payer: MEDICAID

## 2021-02-24 DIAGNOSIS — M25.611 DECREASED RIGHT SHOULDER RANGE OF MOTION: ICD-10-CM

## 2021-02-24 DIAGNOSIS — M54.12 CERVICAL RADICULOPATHY: ICD-10-CM

## 2021-02-24 DIAGNOSIS — R29.3 POSTURE ABNORMALITY: ICD-10-CM

## 2021-02-24 PROCEDURE — 97162 PT EVAL MOD COMPLEX 30 MIN: CPT | Mod: PN | Performed by: PHYSICAL THERAPIST

## 2021-03-02 ENCOUNTER — DOCUMENTATION ONLY (OUTPATIENT)
Dept: REHABILITATION | Facility: HOSPITAL | Age: 46
End: 2021-03-02

## 2021-03-12 ENCOUNTER — CLINICAL SUPPORT (OUTPATIENT)
Dept: REHABILITATION | Facility: HOSPITAL | Age: 46
End: 2021-03-12
Payer: MEDICAID

## 2021-03-12 DIAGNOSIS — R29.3 POSTURE ABNORMALITY: ICD-10-CM

## 2021-03-12 DIAGNOSIS — M25.611 DECREASED RIGHT SHOULDER RANGE OF MOTION: ICD-10-CM

## 2021-03-12 PROCEDURE — 97110 THERAPEUTIC EXERCISES: CPT | Mod: PN,CQ

## 2021-03-15 ENCOUNTER — CLINICAL SUPPORT (OUTPATIENT)
Dept: REHABILITATION | Facility: HOSPITAL | Age: 46
End: 2021-03-15
Payer: MEDICAID

## 2021-03-15 DIAGNOSIS — M25.611 DECREASED RIGHT SHOULDER RANGE OF MOTION: ICD-10-CM

## 2021-03-15 DIAGNOSIS — R29.3 POSTURE ABNORMALITY: ICD-10-CM

## 2021-03-15 PROCEDURE — 97110 THERAPEUTIC EXERCISES: CPT | Mod: PN,CQ

## 2021-03-17 ENCOUNTER — CLINICAL SUPPORT (OUTPATIENT)
Dept: REHABILITATION | Facility: HOSPITAL | Age: 46
End: 2021-03-17
Payer: MEDICAID

## 2021-03-17 DIAGNOSIS — M25.611 DECREASED RIGHT SHOULDER RANGE OF MOTION: ICD-10-CM

## 2021-03-17 DIAGNOSIS — R29.3 POSTURE ABNORMALITY: ICD-10-CM

## 2021-03-17 PROCEDURE — 97110 THERAPEUTIC EXERCISES: CPT | Mod: PN,CQ

## 2021-03-22 ENCOUNTER — CLINICAL SUPPORT (OUTPATIENT)
Dept: REHABILITATION | Facility: HOSPITAL | Age: 46
End: 2021-03-22
Payer: MEDICAID

## 2021-03-22 DIAGNOSIS — R29.3 POSTURE ABNORMALITY: ICD-10-CM

## 2021-03-22 DIAGNOSIS — M25.611 DECREASED RIGHT SHOULDER RANGE OF MOTION: ICD-10-CM

## 2021-03-22 PROCEDURE — 97110 THERAPEUTIC EXERCISES: CPT | Mod: PN,CQ

## 2021-03-23 ENCOUNTER — DOCUMENTATION ONLY (OUTPATIENT)
Dept: REHABILITATION | Facility: HOSPITAL | Age: 46
End: 2021-03-23

## 2021-03-24 ENCOUNTER — CLINICAL SUPPORT (OUTPATIENT)
Dept: REHABILITATION | Facility: HOSPITAL | Age: 46
End: 2021-03-24
Payer: MEDICAID

## 2021-03-24 ENCOUNTER — PATIENT MESSAGE (OUTPATIENT)
Dept: ORTHOPEDICS | Facility: CLINIC | Age: 46
End: 2021-03-24

## 2021-03-24 DIAGNOSIS — R29.3 POSTURE ABNORMALITY: ICD-10-CM

## 2021-03-24 DIAGNOSIS — M25.611 DECREASED RIGHT SHOULDER RANGE OF MOTION: ICD-10-CM

## 2021-03-24 PROCEDURE — 97110 THERAPEUTIC EXERCISES: CPT | Mod: PN,CQ

## 2021-03-26 ENCOUNTER — PATIENT MESSAGE (OUTPATIENT)
Dept: ORTHOPEDICS | Facility: CLINIC | Age: 46
End: 2021-03-26

## 2021-03-29 ENCOUNTER — CLINICAL SUPPORT (OUTPATIENT)
Dept: REHABILITATION | Facility: HOSPITAL | Age: 46
End: 2021-03-29
Payer: MEDICAID

## 2021-03-29 ENCOUNTER — IMMUNIZATION (OUTPATIENT)
Dept: PRIMARY CARE CLINIC | Facility: CLINIC | Age: 46
End: 2021-03-29

## 2021-03-29 ENCOUNTER — PATIENT MESSAGE (OUTPATIENT)
Dept: ORTHOPEDICS | Facility: CLINIC | Age: 46
End: 2021-03-29

## 2021-03-29 DIAGNOSIS — Z23 NEED FOR VACCINATION: Primary | ICD-10-CM

## 2021-03-29 DIAGNOSIS — M25.611 DECREASED RIGHT SHOULDER RANGE OF MOTION: ICD-10-CM

## 2021-03-29 DIAGNOSIS — R29.3 POSTURE ABNORMALITY: ICD-10-CM

## 2021-03-29 PROCEDURE — 91301 PR SARS-COV-2 COVID-19 VACCINE, NO PRSV, 100MCG/0.5ML, IM: ICD-10-PCS | Mod: S$GLB,,, | Performed by: INTERNAL MEDICINE

## 2021-03-29 PROCEDURE — 0011A PR IMMUNIZ ADMIN, SARS-COV-2 COVID-19 VACC, 100MCG/0.5ML, 1ST DOSE: CPT | Mod: CV19,S$GLB,, | Performed by: INTERNAL MEDICINE

## 2021-03-29 PROCEDURE — 97110 THERAPEUTIC EXERCISES: CPT | Mod: PN | Performed by: PHYSICAL THERAPIST

## 2021-03-29 PROCEDURE — 0011A PR IMMUNIZ ADMIN, SARS-COV-2 COVID-19 VACC, 100MCG/0.5ML, 1ST DOSE: ICD-10-PCS | Mod: CV19,S$GLB,, | Performed by: INTERNAL MEDICINE

## 2021-03-29 PROCEDURE — 91301 PR SARS-COV-2 COVID-19 VACCINE, NO PRSV, 100MCG/0.5ML, IM: CPT | Mod: S$GLB,,, | Performed by: INTERNAL MEDICINE

## 2021-03-29 RX ADMIN — Medication 0.5 ML: at 10:03

## 2021-03-31 ENCOUNTER — CLINICAL SUPPORT (OUTPATIENT)
Dept: REHABILITATION | Facility: HOSPITAL | Age: 46
End: 2021-03-31
Payer: MEDICAID

## 2021-03-31 DIAGNOSIS — M25.611 DECREASED RIGHT SHOULDER RANGE OF MOTION: ICD-10-CM

## 2021-03-31 DIAGNOSIS — R29.3 POSTURE ABNORMALITY: ICD-10-CM

## 2021-03-31 PROCEDURE — 97110 THERAPEUTIC EXERCISES: CPT | Mod: PN,CQ

## 2021-04-05 ENCOUNTER — OFFICE VISIT (OUTPATIENT)
Dept: FAMILY MEDICINE | Facility: HOSPITAL | Age: 46
End: 2021-04-05
Payer: MEDICAID

## 2021-04-05 ENCOUNTER — LAB VISIT (OUTPATIENT)
Dept: LAB | Facility: HOSPITAL | Age: 46
End: 2021-04-05
Attending: STUDENT IN AN ORGANIZED HEALTH CARE EDUCATION/TRAINING PROGRAM
Payer: MEDICAID

## 2021-04-05 VITALS
WEIGHT: 250.88 LBS | SYSTOLIC BLOOD PRESSURE: 134 MMHG | BODY MASS INDEX: 50.58 KG/M2 | DIASTOLIC BLOOD PRESSURE: 87 MMHG | HEIGHT: 59 IN

## 2021-04-05 DIAGNOSIS — F32.A ANXIETY AND DEPRESSION: ICD-10-CM

## 2021-04-05 DIAGNOSIS — F41.9 ANXIETY AND DEPRESSION: ICD-10-CM

## 2021-04-05 DIAGNOSIS — Z11.4 ENCOUNTER FOR SCREENING FOR HUMAN IMMUNODEFICIENCY VIRUS (HIV): ICD-10-CM

## 2021-04-05 DIAGNOSIS — L71.9 ROSACEA: Primary | ICD-10-CM

## 2021-04-05 DIAGNOSIS — Z11.59 ENCOUNTER FOR HEPATITIS C SCREENING TEST FOR LOW RISK PATIENT: ICD-10-CM

## 2021-04-05 DIAGNOSIS — E66.01 CLASS 3 SEVERE OBESITY WITH BODY MASS INDEX (BMI) OF 50.0 TO 59.9 IN ADULT, UNSPECIFIED OBESITY TYPE, UNSPECIFIED WHETHER SERIOUS COMORBIDITY PRESENT: ICD-10-CM

## 2021-04-05 DIAGNOSIS — Z00.00 PREVENTATIVE HEALTH CARE: ICD-10-CM

## 2021-04-05 LAB
ALBUMIN SERPL BCP-MCNC: 3.5 G/DL (ref 3.5–5.2)
ALP SERPL-CCNC: 63 U/L (ref 55–135)
ALT SERPL W/O P-5'-P-CCNC: 15 U/L (ref 10–44)
ANION GAP SERPL CALC-SCNC: 11 MMOL/L (ref 8–16)
AST SERPL-CCNC: 14 U/L (ref 10–40)
BASOPHILS # BLD AUTO: 0.02 K/UL (ref 0–0.2)
BASOPHILS NFR BLD: 0.3 % (ref 0–1.9)
BILIRUB SERPL-MCNC: 0.4 MG/DL (ref 0.1–1)
BUN SERPL-MCNC: 9 MG/DL (ref 6–20)
CALCIUM SERPL-MCNC: 8.6 MG/DL (ref 8.7–10.5)
CHLORIDE SERPL-SCNC: 105 MMOL/L (ref 95–110)
CHOLEST SERPL-MCNC: 197 MG/DL (ref 120–199)
CHOLEST/HDLC SERPL: 3.7 {RATIO} (ref 2–5)
CO2 SERPL-SCNC: 21 MMOL/L (ref 23–29)
CREAT SERPL-MCNC: 0.7 MG/DL (ref 0.5–1.4)
DIFFERENTIAL METHOD: NORMAL
EOSINOPHIL # BLD AUTO: 0.1 K/UL (ref 0–0.5)
EOSINOPHIL NFR BLD: 1.3 % (ref 0–8)
ERYTHROCYTE [DISTWIDTH] IN BLOOD BY AUTOMATED COUNT: 13.8 % (ref 11.5–14.5)
EST. GFR  (AFRICAN AMERICAN): >60 ML/MIN/1.73 M^2
EST. GFR  (NON AFRICAN AMERICAN): >60 ML/MIN/1.73 M^2
ESTIMATED AVG GLUCOSE: 105 MG/DL (ref 68–131)
GLUCOSE SERPL-MCNC: 88 MG/DL (ref 70–110)
HBA1C MFR BLD: 5.3 % (ref 4–5.6)
HCT VFR BLD AUTO: 41.9 % (ref 37–48.5)
HDLC SERPL-MCNC: 53 MG/DL (ref 40–75)
HDLC SERPL: 26.9 % (ref 20–50)
HGB BLD-MCNC: 13.4 G/DL (ref 12–16)
IMM GRANULOCYTES # BLD AUTO: 0.02 K/UL (ref 0–0.04)
IMM GRANULOCYTES NFR BLD AUTO: 0.3 % (ref 0–0.5)
LDLC SERPL CALC-MCNC: 124.6 MG/DL (ref 63–159)
LYMPHOCYTES # BLD AUTO: 2.2 K/UL (ref 1–4.8)
LYMPHOCYTES NFR BLD: 33.4 % (ref 18–48)
MCH RBC QN AUTO: 27.5 PG (ref 27–31)
MCHC RBC AUTO-ENTMCNC: 32 G/DL (ref 32–36)
MCV RBC AUTO: 86 FL (ref 82–98)
MONOCYTES # BLD AUTO: 0.5 K/UL (ref 0.3–1)
MONOCYTES NFR BLD: 6.9 % (ref 4–15)
NEUTROPHILS # BLD AUTO: 3.9 K/UL (ref 1.8–7.7)
NEUTROPHILS NFR BLD: 57.8 % (ref 38–73)
NONHDLC SERPL-MCNC: 144 MG/DL
NRBC BLD-RTO: 0 /100 WBC
PLATELET # BLD AUTO: 256 K/UL (ref 150–450)
PMV BLD AUTO: 10.1 FL (ref 9.2–12.9)
POTASSIUM SERPL-SCNC: 4.2 MMOL/L (ref 3.5–5.1)
PROT SERPL-MCNC: 6.9 G/DL (ref 6–8.4)
RBC # BLD AUTO: 4.87 M/UL (ref 4–5.4)
SODIUM SERPL-SCNC: 137 MMOL/L (ref 136–145)
TRIGL SERPL-MCNC: 97 MG/DL (ref 30–150)
TSH SERPL DL<=0.005 MIU/L-ACNC: 2.51 UIU/ML (ref 0.4–4)
WBC # BLD AUTO: 6.68 K/UL (ref 3.9–12.7)

## 2021-04-05 PROCEDURE — 83036 HEMOGLOBIN GLYCOSYLATED A1C: CPT | Performed by: STUDENT IN AN ORGANIZED HEALTH CARE EDUCATION/TRAINING PROGRAM

## 2021-04-05 PROCEDURE — 36415 COLL VENOUS BLD VENIPUNCTURE: CPT | Performed by: STUDENT IN AN ORGANIZED HEALTH CARE EDUCATION/TRAINING PROGRAM

## 2021-04-05 PROCEDURE — 84443 ASSAY THYROID STIM HORMONE: CPT | Performed by: STUDENT IN AN ORGANIZED HEALTH CARE EDUCATION/TRAINING PROGRAM

## 2021-04-05 PROCEDURE — 80061 LIPID PANEL: CPT | Performed by: STUDENT IN AN ORGANIZED HEALTH CARE EDUCATION/TRAINING PROGRAM

## 2021-04-05 PROCEDURE — 86803 HEPATITIS C AB TEST: CPT | Performed by: STUDENT IN AN ORGANIZED HEALTH CARE EDUCATION/TRAINING PROGRAM

## 2021-04-05 PROCEDURE — 80053 COMPREHEN METABOLIC PANEL: CPT | Performed by: STUDENT IN AN ORGANIZED HEALTH CARE EDUCATION/TRAINING PROGRAM

## 2021-04-05 PROCEDURE — 99213 OFFICE O/P EST LOW 20 MIN: CPT | Performed by: STUDENT IN AN ORGANIZED HEALTH CARE EDUCATION/TRAINING PROGRAM

## 2021-04-05 PROCEDURE — 85025 COMPLETE CBC W/AUTO DIFF WBC: CPT | Performed by: STUDENT IN AN ORGANIZED HEALTH CARE EDUCATION/TRAINING PROGRAM

## 2021-04-05 PROCEDURE — 86703 HIV-1/HIV-2 1 RESULT ANTBDY: CPT | Performed by: STUDENT IN AN ORGANIZED HEALTH CARE EDUCATION/TRAINING PROGRAM

## 2021-04-05 RX ORDER — BUPROPION HYDROCHLORIDE 150 MG/1
150 TABLET ORAL DAILY
Qty: 30 TABLET | Refills: 11 | Status: SHIPPED | OUTPATIENT
Start: 2021-04-05 | End: 2021-05-05 | Stop reason: DRUGHIGH

## 2021-04-06 ENCOUNTER — PATIENT MESSAGE (OUTPATIENT)
Dept: FAMILY MEDICINE | Facility: HOSPITAL | Age: 46
End: 2021-04-06

## 2021-04-06 LAB
HCV AB SERPL QL IA: NEGATIVE
HIV 1+2 AB+HIV1 P24 AG SERPL QL IA: NEGATIVE

## 2021-04-12 ENCOUNTER — PATIENT MESSAGE (OUTPATIENT)
Dept: FAMILY MEDICINE | Facility: HOSPITAL | Age: 46
End: 2021-04-12

## 2021-04-13 ENCOUNTER — TELEPHONE (OUTPATIENT)
Dept: REHABILITATION | Facility: HOSPITAL | Age: 46
End: 2021-04-13

## 2021-04-14 ENCOUNTER — OFFICE VISIT (OUTPATIENT)
Dept: FAMILY MEDICINE | Facility: HOSPITAL | Age: 46
End: 2021-04-14
Payer: MEDICAID

## 2021-04-14 VITALS
HEART RATE: 91 BPM | HEIGHT: 56 IN | BODY MASS INDEX: 56.19 KG/M2 | SYSTOLIC BLOOD PRESSURE: 132 MMHG | WEIGHT: 249.81 LBS | DIASTOLIC BLOOD PRESSURE: 87 MMHG

## 2021-04-14 DIAGNOSIS — B35.9 TINEA: Primary | ICD-10-CM

## 2021-04-14 PROCEDURE — 99213 OFFICE O/P EST LOW 20 MIN: CPT | Performed by: STUDENT IN AN ORGANIZED HEALTH CARE EDUCATION/TRAINING PROGRAM

## 2021-04-14 RX ORDER — CLOTRIMAZOLE 1 %
CREAM (GRAM) TOPICAL 2 TIMES DAILY
Qty: 60 G | Refills: 0 | Status: SHIPPED | OUTPATIENT
Start: 2021-04-14 | End: 2021-04-19 | Stop reason: SDUPTHER

## 2021-04-14 RX ORDER — FLUCONAZOLE 200 MG/1
200 TABLET ORAL WEEKLY
Qty: 4 TABLET | Refills: 0 | Status: SHIPPED | OUTPATIENT
Start: 2021-04-14 | End: 2021-05-06

## 2021-04-14 RX ORDER — NORETHINDRONE ACETATE AND ETHINYL ESTRADIOL .02; 1 MG/1; MG/1
1 TABLET ORAL DAILY
COMMUNITY
Start: 2020-10-03 | End: 2022-03-28 | Stop reason: SDUPTHER

## 2021-04-15 ENCOUNTER — PATIENT MESSAGE (OUTPATIENT)
Dept: FAMILY MEDICINE | Facility: HOSPITAL | Age: 46
End: 2021-04-15

## 2021-04-15 DIAGNOSIS — L29.9 PRURITUS: Primary | ICD-10-CM

## 2021-04-15 RX ORDER — NYSTATIN 100000 [USP'U]/G
POWDER TOPICAL 2 TIMES DAILY
Qty: 60 G | Refills: 2 | Status: SHIPPED | OUTPATIENT
Start: 2021-04-15 | End: 2022-04-21

## 2021-04-15 RX ORDER — CAMPHOR 0.45 %
GEL (GRAM) TOPICAL 3 TIMES DAILY PRN
Qty: 35 G | Refills: 0 | Status: SHIPPED | OUTPATIENT
Start: 2021-04-15 | End: 2022-04-21

## 2021-04-17 ENCOUNTER — PATIENT MESSAGE (OUTPATIENT)
Dept: FAMILY MEDICINE | Facility: HOSPITAL | Age: 46
End: 2021-04-17

## 2021-04-17 DIAGNOSIS — B35.9 TINEA: ICD-10-CM

## 2021-04-17 RX ORDER — CLOTRIMAZOLE 1 %
CREAM (GRAM) TOPICAL 2 TIMES DAILY
Qty: 60 G | Refills: 0 | Status: CANCELLED | OUTPATIENT
Start: 2021-04-17

## 2021-04-18 ENCOUNTER — OFFICE VISIT (OUTPATIENT)
Dept: URGENT CARE | Facility: CLINIC | Age: 46
End: 2021-04-18
Payer: MEDICAID

## 2021-04-18 VITALS
OXYGEN SATURATION: 97 % | SYSTOLIC BLOOD PRESSURE: 120 MMHG | HEART RATE: 87 BPM | BODY MASS INDEX: 56.01 KG/M2 | TEMPERATURE: 99 F | DIASTOLIC BLOOD PRESSURE: 78 MMHG | RESPIRATION RATE: 18 BRPM | HEIGHT: 56 IN | WEIGHT: 249 LBS

## 2021-04-18 DIAGNOSIS — R21 RASH: ICD-10-CM

## 2021-04-18 DIAGNOSIS — B37.2 INTERTRIGINOUS CANDIDIASIS: Primary | ICD-10-CM

## 2021-04-18 PROCEDURE — 99213 OFFICE O/P EST LOW 20 MIN: CPT | Mod: S$GLB,,, | Performed by: PHYSICIAN ASSISTANT

## 2021-04-18 PROCEDURE — 99213 PR OFFICE/OUTPT VISIT, EST, LEVL III, 20-29 MIN: ICD-10-PCS | Mod: S$GLB,,, | Performed by: PHYSICIAN ASSISTANT

## 2021-04-18 RX ORDER — HYDROCORTISONE 25 MG/G
CREAM TOPICAL 2 TIMES DAILY
Qty: 453.6 G | Refills: 0 | Status: SHIPPED | OUTPATIENT
Start: 2021-04-18 | End: 2022-04-21

## 2021-04-18 RX ORDER — NORETHINDRONE ACETATE AND ETHINYL ESTRADIOL AND FERROUS FUMARATE 1MG-20(21)
1 KIT ORAL DAILY
COMMUNITY
Start: 2021-04-14 | End: 2021-12-27

## 2021-04-19 ENCOUNTER — TELEPHONE (OUTPATIENT)
Dept: URGENT CARE | Facility: CLINIC | Age: 46
End: 2021-04-19

## 2021-04-19 ENCOUNTER — PATIENT MESSAGE (OUTPATIENT)
Dept: FAMILY MEDICINE | Facility: HOSPITAL | Age: 46
End: 2021-04-19

## 2021-04-19 DIAGNOSIS — B35.9 TINEA: ICD-10-CM

## 2021-04-19 RX ORDER — CLOTRIMAZOLE 1 %
CREAM (GRAM) TOPICAL 2 TIMES DAILY
Qty: 113 G | Refills: 1 | Status: SHIPPED | OUTPATIENT
Start: 2021-04-19 | End: 2022-04-21

## 2021-04-28 ENCOUNTER — IMMUNIZATION (OUTPATIENT)
Dept: PRIMARY CARE CLINIC | Facility: CLINIC | Age: 46
End: 2021-04-28
Payer: MEDICAID

## 2021-04-28 DIAGNOSIS — Z23 NEED FOR VACCINATION: Primary | ICD-10-CM

## 2021-04-28 PROCEDURE — 0012A PR IMMUNIZ ADMIN, SARS-COV-2 COVID-19 VACC, 100MCG/0.5ML, 2ND DOSE: ICD-10-PCS | Mod: CV19,S$GLB,, | Performed by: INTERNAL MEDICINE

## 2021-04-28 PROCEDURE — 0012A PR IMMUNIZ ADMIN, SARS-COV-2 COVID-19 VACC, 100MCG/0.5ML, 2ND DOSE: CPT | Mod: CV19,S$GLB,, | Performed by: INTERNAL MEDICINE

## 2021-04-28 PROCEDURE — 91301 PR SARS-COV-2 COVID-19 VACCINE, NO PRSV, 100MCG/0.5ML, IM: CPT | Mod: S$GLB,,, | Performed by: INTERNAL MEDICINE

## 2021-04-28 PROCEDURE — 91301 PR SARS-COV-2 COVID-19 VACCINE, NO PRSV, 100MCG/0.5ML, IM: ICD-10-PCS | Mod: S$GLB,,, | Performed by: INTERNAL MEDICINE

## 2021-04-28 RX ADMIN — Medication 0.5 ML: at 12:04

## 2021-05-05 ENCOUNTER — OFFICE VISIT (OUTPATIENT)
Dept: FAMILY MEDICINE | Facility: HOSPITAL | Age: 46
End: 2021-05-05
Payer: MEDICAID

## 2021-05-05 VITALS
HEIGHT: 56 IN | HEART RATE: 67 BPM | BODY MASS INDEX: 55.59 KG/M2 | WEIGHT: 247.13 LBS | DIASTOLIC BLOOD PRESSURE: 79 MMHG | SYSTOLIC BLOOD PRESSURE: 123 MMHG

## 2021-05-05 DIAGNOSIS — F32.A ANXIETY AND DEPRESSION: Chronic | ICD-10-CM

## 2021-05-05 DIAGNOSIS — F41.9 ANXIETY AND DEPRESSION: Chronic | ICD-10-CM

## 2021-05-05 PROBLEM — D50.0 IRON DEFICIENCY ANEMIA DUE TO CHRONIC BLOOD LOSS: Status: RESOLVED | Noted: 2018-04-23 | Resolved: 2021-05-05

## 2021-05-05 PROBLEM — L71.9 ROSACEA: Chronic | Status: ACTIVE | Noted: 2021-05-05

## 2021-05-05 PROBLEM — L71.9 ROSACEA: Status: ACTIVE | Noted: 2021-05-05

## 2021-05-05 PROCEDURE — 99213 OFFICE O/P EST LOW 20 MIN: CPT | Performed by: STUDENT IN AN ORGANIZED HEALTH CARE EDUCATION/TRAINING PROGRAM

## 2021-05-05 RX ORDER — PREDNISONE 10 MG/1
TABLET ORAL
COMMUNITY
Start: 2021-04-21 | End: 2021-05-05

## 2021-05-05 RX ORDER — CLOTRIMAZOLE AND BETAMETHASONE DIPROPIONATE 10; .64 MG/G; MG/G
CREAM TOPICAL
COMMUNITY
Start: 2021-04-21 | End: 2022-04-21

## 2021-05-05 RX ORDER — BUPROPION HYDROCHLORIDE 300 MG/1
300 TABLET ORAL DAILY
Qty: 30 TABLET | Refills: 11 | Status: SHIPPED | OUTPATIENT
Start: 2021-05-05 | End: 2022-04-21

## 2021-06-21 ENCOUNTER — DOCUMENTATION ONLY (OUTPATIENT)
Dept: REHABILITATION | Facility: HOSPITAL | Age: 46
End: 2021-06-21

## 2021-06-21 PROBLEM — M25.611 DECREASED RIGHT SHOULDER RANGE OF MOTION: Status: RESOLVED | Noted: 2021-02-24 | Resolved: 2021-06-21

## 2021-06-21 PROBLEM — R29.3 POSTURE ABNORMALITY: Status: RESOLVED | Noted: 2021-02-24 | Resolved: 2021-06-21

## 2022-01-05 ENCOUNTER — LAB VISIT (OUTPATIENT)
Dept: PRIMARY CARE CLINIC | Facility: CLINIC | Age: 47
End: 2022-01-05
Payer: MEDICAID

## 2022-01-05 DIAGNOSIS — Z20.822 CONTACT WITH AND (SUSPECTED) EXPOSURE TO COVID-19: ICD-10-CM

## 2022-01-05 LAB
CTP QC/QA: YES
SARS-COV-2 AG RESP QL IA.RAPID: POSITIVE

## 2022-01-05 PROCEDURE — 87811 SARS-COV-2 COVID19 W/OPTIC: CPT

## 2022-03-24 ENCOUNTER — PATIENT MESSAGE (OUTPATIENT)
Dept: OBSTETRICS AND GYNECOLOGY | Facility: CLINIC | Age: 47
End: 2022-03-24
Payer: MEDICAID

## 2022-03-28 RX ORDER — NORETHINDRONE ACETATE AND ETHINYL ESTRADIOL .02; 1 MG/1; MG/1
1 TABLET ORAL DAILY
Qty: 84 TABLET | Refills: 0 | Status: SHIPPED | OUTPATIENT
Start: 2022-03-28 | End: 2023-07-07

## 2022-04-21 ENCOUNTER — OFFICE VISIT (OUTPATIENT)
Dept: OBSTETRICS AND GYNECOLOGY | Facility: CLINIC | Age: 47
End: 2022-04-21
Payer: MEDICAID

## 2022-04-21 ENCOUNTER — PATIENT MESSAGE (OUTPATIENT)
Dept: OBSTETRICS AND GYNECOLOGY | Facility: CLINIC | Age: 47
End: 2022-04-21

## 2022-04-21 VITALS — WEIGHT: 250 LBS | SYSTOLIC BLOOD PRESSURE: 116 MMHG | DIASTOLIC BLOOD PRESSURE: 84 MMHG | BODY MASS INDEX: 56.05 KG/M2

## 2022-04-21 DIAGNOSIS — E66.01 CLASS 3 SEVERE OBESITY WITH BODY MASS INDEX (BMI) OF 50.0 TO 59.9 IN ADULT, UNSPECIFIED OBESITY TYPE, UNSPECIFIED WHETHER SERIOUS COMORBIDITY PRESENT: ICD-10-CM

## 2022-04-21 DIAGNOSIS — Z30.09 UNWANTED FERTILITY: ICD-10-CM

## 2022-04-21 DIAGNOSIS — Z12.31 SCREENING MAMMOGRAM, ENCOUNTER FOR: ICD-10-CM

## 2022-04-21 DIAGNOSIS — N39.3 STRESS INCONTINENCE: ICD-10-CM

## 2022-04-21 DIAGNOSIS — Z01.419 ENCOUNTER FOR ANNUAL ROUTINE GYNECOLOGICAL EXAMINATION: Primary | ICD-10-CM

## 2022-04-21 PROCEDURE — 3074F SYST BP LT 130 MM HG: CPT | Mod: CPTII,,, | Performed by: OBSTETRICS & GYNECOLOGY

## 2022-04-21 PROCEDURE — 99396 PR PREVENTIVE VISIT,EST,40-64: ICD-10-PCS | Mod: S$PBB,,, | Performed by: OBSTETRICS & GYNECOLOGY

## 2022-04-21 PROCEDURE — 87591 N.GONORRHOEAE DNA AMP PROB: CPT | Mod: 59 | Performed by: OBSTETRICS & GYNECOLOGY

## 2022-04-21 PROCEDURE — 99213 OFFICE O/P EST LOW 20 MIN: CPT | Mod: PBBFAC,PO | Performed by: OBSTETRICS & GYNECOLOGY

## 2022-04-21 PROCEDURE — 1159F MED LIST DOCD IN RCRD: CPT | Mod: CPTII,,, | Performed by: OBSTETRICS & GYNECOLOGY

## 2022-04-21 PROCEDURE — 3074F PR MOST RECENT SYSTOLIC BLOOD PRESSURE < 130 MM HG: ICD-10-PCS | Mod: CPTII,,, | Performed by: OBSTETRICS & GYNECOLOGY

## 2022-04-21 PROCEDURE — 3008F BODY MASS INDEX DOCD: CPT | Mod: CPTII,,, | Performed by: OBSTETRICS & GYNECOLOGY

## 2022-04-21 PROCEDURE — 99999 PR PBB SHADOW E&M-EST. PATIENT-LVL III: ICD-10-PCS | Mod: PBBFAC,,, | Performed by: OBSTETRICS & GYNECOLOGY

## 2022-04-21 PROCEDURE — 3079F DIAST BP 80-89 MM HG: CPT | Mod: CPTII,,, | Performed by: OBSTETRICS & GYNECOLOGY

## 2022-04-21 PROCEDURE — 99999 PR PBB SHADOW E&M-EST. PATIENT-LVL III: CPT | Mod: PBBFAC,,, | Performed by: OBSTETRICS & GYNECOLOGY

## 2022-04-21 PROCEDURE — 99396 PREV VISIT EST AGE 40-64: CPT | Mod: S$PBB,,, | Performed by: OBSTETRICS & GYNECOLOGY

## 2022-04-21 PROCEDURE — 3079F PR MOST RECENT DIASTOLIC BLOOD PRESSURE 80-89 MM HG: ICD-10-PCS | Mod: CPTII,,, | Performed by: OBSTETRICS & GYNECOLOGY

## 2022-04-21 PROCEDURE — 3008F PR BODY MASS INDEX (BMI) DOCUMENTED: ICD-10-PCS | Mod: CPTII,,, | Performed by: OBSTETRICS & GYNECOLOGY

## 2022-04-21 PROCEDURE — 87491 CHLMYD TRACH DNA AMP PROBE: CPT | Mod: 59 | Performed by: OBSTETRICS & GYNECOLOGY

## 2022-04-21 PROCEDURE — 1159F PR MEDICATION LIST DOCUMENTED IN MEDICAL RECORD: ICD-10-PCS | Mod: CPTII,,, | Performed by: OBSTETRICS & GYNECOLOGY

## 2022-04-21 PROCEDURE — 87481 CANDIDA DNA AMP PROBE: CPT | Mod: 59 | Performed by: OBSTETRICS & GYNECOLOGY

## 2022-04-21 PROCEDURE — 87801 DETECT AGNT MULT DNA AMPLI: CPT | Performed by: OBSTETRICS & GYNECOLOGY

## 2022-04-21 RX ORDER — NORETHINDRONE ACETATE AND ETHINYL ESTRADIOL 1MG-20(21)
1 KIT ORAL DAILY
Qty: 84 TABLET | Refills: 4 | Status: SHIPPED | OUTPATIENT
Start: 2022-04-21 | End: 2023-06-16 | Stop reason: SDUPTHER

## 2022-04-21 NOTE — LETTER
[facility]  [address], [place] [address]  [place] [address]  Phone: [phone] April 21, 2022     [facility]     Patient: Sydnee Don   MR Number: [id number]   YOB: 1975   Date of Visit: 4/21/2022       Dear [facility]:    I am referring my patient, Sydnee Don, to you for evaluation of mixed stress/urge incontience.      I appreciate your assistance in her care and look forward to your findings and recommendations.    Sincerely,                           Teodora Washington MD

## 2022-04-21 NOTE — PROGRESS NOTES
"Chief Complaint   Patient presents with    Annual Exam       HISTORY OF PRESENT ILLNESS:   Sydnee Don is a 47 y.o. female  who presents for well woman exam.  Patient's last menstrual period was 2022 (approximate)..  She complains of  Constant urinary leakage. Cycles are "regular. Desires STD testing but declines blood work. Desires ocps for birth control but is thinking about a tubal ligation. She c/o urinary leaking. She reports some urge where she feels the need to go and has to run to the restroom and may leak a little. She goes multiple times a day and 2 times at night. Her most bothersome complaint is a constant urinary leakage that is worsened if she coughs or laughs or walks. I had referred her to uro-gyn and she did not go but is interested in being evaluated now.      Past Medical History:   Diagnosis Date    Anxiety and depression 2021    Cervical radiculopathy     Iron deficiency anemia due to chronic blood loss 2018    Rosacea 2021    Stress incontinence           Past Surgical History:   Procedure Laterality Date     SECTION           Social History     Socioeconomic History    Marital status:    Tobacco Use    Smoking status: Never Smoker    Smokeless tobacco: Never Used   Substance and Sexual Activity    Alcohol use: No    Drug use: No    Sexual activity: Yes     Partners: Male     Birth control/protection: OCP       Family History   Problem Relation Age of Onset    Hyperlipidemia Father     Hypertension Father     Hyperlipidemia Mother     Hypertension Mother     Arthritis Mother     Breast cancer Paternal Aunt          OB History    Para Term  AB Living   7 4 4 0 3 4   SAB IAB Ectopic Multiple Live Births   2       4      # Outcome Date GA Lbr Gabino/2nd Weight Sex Delivery Anes PTL Lv   7 SAB            6 Term     M CS-Unspec  N IVONNE   5 Term     M CS-Unspec  N IVONNE   4 SAB            3 Term     F   Y IVONNE   2 Term     F " CS-Unspec  Y IVONNE   1 AB      SAB        COMPREHENSIVE GYN HISTORY:  PAP History: Denies abnormal Paps. 7/2018 NILM/HPV -  Infection History: Denies STDs. Denies PID.  Benign History: Denies uterine fibroids. Denies ovarian cysts. Denies endometriosis.   Cancer History: Denies cervical cancer. Denies uterine cancer or hyperplasia. Denies ovarian cancer. Denies vulvar cancer or pre-cancer. Denies vaginal cancer or pre-cancer. Denies breast cancer. Denies colon cancer.  Sexual Activity History:   reports that she currently engages in sexual activity and has had male partners.   Menstrual History: Age of menarche:11 years. Every mon, flows for 5d days. moderate flow.  Dysmenorrhea History: Reports mild dysmenorrhea.  Contraception: none    ROS:  GENERAL: Denies weight gain or weight loss. Feeling well overall.   SKIN: Denies rash or lesions.   HEAD: Denies headache.   NODES: Denies enlarged lymph nodes.   CHEST: Denies shortness of breath.   ABDOMEN: No abdominal pain, constipation, diarrhea, nausea, vomiting or rectal bleeding.   URINARY: No frequency, dysuria, hematuria, or burning on urination.  REPRODUCTIVE: See HPI.   BREASTS: The patient denies pain, lumps, or nipple discharge.       /84   Wt 113.4 kg (250 lb)   LMP 04/19/2022 (Approximate)   BMI 56.05 kg/m²      APPEARANCE: Well nourished, well developed, in no acute distress.  NECK: Neck symmetric   ABDOMEN: Soft. No tenderness or masses. No hernias. No hepatosplenomegaly. obese  BREASTS: Symmetrical, no skin changes or visible lesions. No palpable masses, nipple discharge or adenopathy bilaterally.  PELVIC:   VULVA: No lesions. Normal female genitalia.  URETHRAL MEATUS: Normal size and location, no lesions, no prolapse. + cough stress test  URETHRA: No masses, tenderness, prolapse or scarring.  VAGINA: Moist and well rugated, no discharge, no significant cystocele or rectocele.  CERVIX: No lesions and discharge.  UTERUS: Normal size, regular shape,  mobile, non-tender, bladder base nontender.  ADNEXA: No masses or tenderness.        1. Encounter for annual routine gynecological examination    2. Stress incontinence    3. Unwanted fertility    4. Class 3 severe obesity with body mass index (BMI) of 50.0 to 59.9 in adult, unspecified obesity type, unspecified whether serious comorbidity present        Plan:  1. Routine gyn annual  s/p normal breast exam and MMG ordered.   Pap with HPV cotesting up to date, next needed 2023   STD testing: GC/CT and affirm ordered   Counseled on contraception and desires ocps, will refill. Will let me know if desires BTL     2. Sounds like a mix of stress and urge incontinence with stress being her biggest complaint. We discussed life style modifications like weight loss and kegels and leaning over after urination to empty bladder fully. Her PVR is normal, will send for UA. We discussed treatment is most often surgery with sling. She would like to meet with uro-gyn to get more information about that because this is starting to affect her daily life. Sent referral to [facility] because she doesn't want to go to [facility], operated by [facility].    -may want to do BTL at same time as     3. Obesity: was looking into weight loss surgery and nutritionist before the pandemic but never went to encouraged to look back into that as obesity is likely worsening the incontience.       F/u in 1 yr or RPN

## 2022-04-24 LAB
BACTERIAL VAGINOSIS DNA: NEGATIVE
C TRACH DNA SPEC QL NAA+PROBE: NOT DETECTED
CANDIDA GLABRATA DNA: NEGATIVE
CANDIDA KRUSEI DNA: NEGATIVE
CANDIDA RRNA VAG QL PROBE: NEGATIVE
N GONORRHOEA DNA SPEC QL NAA+PROBE: NOT DETECTED
T VAGINALIS RRNA GENITAL QL PROBE: NEGATIVE

## 2022-04-25 ENCOUNTER — PATIENT MESSAGE (OUTPATIENT)
Dept: OBSTETRICS AND GYNECOLOGY | Facility: HOSPITAL | Age: 47
End: 2022-04-25
Payer: MEDICAID

## 2022-04-28 ENCOUNTER — HOSPITAL ENCOUNTER (OUTPATIENT)
Dept: RADIOLOGY | Facility: HOSPITAL | Age: 47
Discharge: HOME | End: 2022-04-28
Attending: OBSTETRICS & GYNECOLOGY
Payer: MEDICAID

## 2022-04-28 DIAGNOSIS — Z12.31 SCREENING MAMMOGRAM, ENCOUNTER FOR: ICD-10-CM

## 2022-04-28 PROCEDURE — 77067 SCR MAMMO BI INCL CAD: CPT | Mod: TC

## 2022-04-28 PROCEDURE — 77063 MAMMO DIGITAL SCREENING BILAT WITH TOMO: ICD-10-PCS | Mod: 26,,, | Performed by: RADIOLOGY

## 2022-04-28 PROCEDURE — 77067 MAMMO DIGITAL SCREENING BILAT WITH TOMO: ICD-10-PCS | Mod: 26,,, | Performed by: RADIOLOGY

## 2022-04-28 PROCEDURE — 77067 SCR MAMMO BI INCL CAD: CPT | Mod: 26,,, | Performed by: RADIOLOGY

## 2022-04-28 PROCEDURE — 77063 BREAST TOMOSYNTHESIS BI: CPT | Mod: 26,,, | Performed by: RADIOLOGY

## 2022-10-14 ENCOUNTER — OFFICE VISIT (OUTPATIENT)
Dept: URGENT CARE | Facility: CLINIC | Age: 47
End: 2022-10-14
Payer: MEDICAID

## 2022-10-14 VITALS
DIASTOLIC BLOOD PRESSURE: 93 MMHG | HEART RATE: 94 BPM | WEIGHT: 250 LBS | TEMPERATURE: 99 F | RESPIRATION RATE: 19 BRPM | OXYGEN SATURATION: 95 % | SYSTOLIC BLOOD PRESSURE: 147 MMHG | BODY MASS INDEX: 56.05 KG/M2

## 2022-10-14 DIAGNOSIS — R05.9 COUGH, UNSPECIFIED TYPE: Primary | ICD-10-CM

## 2022-10-14 DIAGNOSIS — J40 BRONCHITIS: ICD-10-CM

## 2022-10-14 DIAGNOSIS — Z20.828 EXPOSURE TO THE FLU: ICD-10-CM

## 2022-10-14 LAB
CTP QC/QA: YES
CTP QC/QA: YES
POC MOLECULAR INFLUENZA A AGN: NEGATIVE
POC MOLECULAR INFLUENZA B AGN: NEGATIVE
SARS-COV-2 RDRP RESP QL NAA+PROBE: NEGATIVE

## 2022-10-14 PROCEDURE — 3077F SYST BP >= 140 MM HG: CPT | Mod: CPTII,S$GLB,, | Performed by: NURSE PRACTITIONER

## 2022-10-14 PROCEDURE — 3080F PR MOST RECENT DIASTOLIC BLOOD PRESSURE >= 90 MM HG: ICD-10-PCS | Mod: CPTII,S$GLB,, | Performed by: NURSE PRACTITIONER

## 2022-10-14 PROCEDURE — 99214 OFFICE O/P EST MOD 30 MIN: CPT | Mod: S$GLB,,, | Performed by: NURSE PRACTITIONER

## 2022-10-14 PROCEDURE — 3077F PR MOST RECENT SYSTOLIC BLOOD PRESSURE >= 140 MM HG: ICD-10-PCS | Mod: CPTII,S$GLB,, | Performed by: NURSE PRACTITIONER

## 2022-10-14 PROCEDURE — 1160F RVW MEDS BY RX/DR IN RCRD: CPT | Mod: CPTII,S$GLB,, | Performed by: NURSE PRACTITIONER

## 2022-10-14 PROCEDURE — 87635: ICD-10-PCS | Mod: QW,S$GLB,, | Performed by: NURSE PRACTITIONER

## 2022-10-14 PROCEDURE — 87635 SARS-COV-2 COVID-19 AMP PRB: CPT | Mod: QW,S$GLB,, | Performed by: NURSE PRACTITIONER

## 2022-10-14 PROCEDURE — 87502 INFLUENZA DNA AMP PROBE: CPT | Mod: QW,S$GLB,, | Performed by: NURSE PRACTITIONER

## 2022-10-14 PROCEDURE — 1159F MED LIST DOCD IN RCRD: CPT | Mod: CPTII,S$GLB,, | Performed by: NURSE PRACTITIONER

## 2022-10-14 PROCEDURE — 99214 PR OFFICE/OUTPT VISIT, EST, LEVL IV, 30-39 MIN: ICD-10-PCS | Mod: S$GLB,,, | Performed by: NURSE PRACTITIONER

## 2022-10-14 PROCEDURE — 87502 POCT INFLUENZA A/B MOLECULAR: ICD-10-PCS | Mod: QW,S$GLB,, | Performed by: NURSE PRACTITIONER

## 2022-10-14 PROCEDURE — 3080F DIAST BP >= 90 MM HG: CPT | Mod: CPTII,S$GLB,, | Performed by: NURSE PRACTITIONER

## 2022-10-14 PROCEDURE — 1159F PR MEDICATION LIST DOCUMENTED IN MEDICAL RECORD: ICD-10-PCS | Mod: CPTII,S$GLB,, | Performed by: NURSE PRACTITIONER

## 2022-10-14 PROCEDURE — 1160F PR REVIEW ALL MEDS BY PRESCRIBER/CLIN PHARMACIST DOCUMENTED: ICD-10-PCS | Mod: CPTII,S$GLB,, | Performed by: NURSE PRACTITIONER

## 2022-10-14 RX ORDER — AZITHROMYCIN 250 MG/1
TABLET, FILM COATED ORAL
Qty: 6 TABLET | Refills: 0 | Status: SHIPPED | OUTPATIENT
Start: 2022-10-14 | End: 2024-01-25

## 2022-10-14 RX ORDER — PROMETHAZINE HYDROCHLORIDE AND DEXTROMETHORPHAN HYDROBROMIDE 6.25; 15 MG/5ML; MG/5ML
5 SYRUP ORAL NIGHTLY PRN
Qty: 120 ML | Refills: 0 | Status: SHIPPED | OUTPATIENT
Start: 2022-10-14 | End: 2022-10-19

## 2022-10-14 RX ORDER — BENZONATATE 200 MG/1
200 CAPSULE ORAL 3 TIMES DAILY PRN
Qty: 30 CAPSULE | Refills: 0 | Status: SHIPPED | OUTPATIENT
Start: 2022-10-14 | End: 2023-07-07

## 2022-10-14 NOTE — PROGRESS NOTES
Subjective:       Patient ID: Sydnee Don is a 47 y.o. female.    Vitals:  weight is 113.4 kg (250 lb). Her temperature is 98.7 °F (37.1 °C). Her blood pressure is 147/93 (abnormal) and her pulse is 94. Her respiration is 19 and oxygen saturation is 95%.     Chief Complaint: Cough    Pt is complaining of bad cough and body aches that started last Thursday and has gotten worse.     + exposure to the flu (14y/o son who lives w her)    Cough  This is a new problem. The current episode started in the past 7 days. Associated symptoms include chills, headaches, myalgias, nasal congestion, postnasal drip and a sore throat. Pertinent negatives include no ear congestion, ear pain, fever, sweats, weight loss or wheezing. Treatments tried: robutussin. The treatment provided no relief.     Constitution: Positive for chills. Negative for fever.   HENT:  Positive for postnasal drip and sore throat. Negative for ear pain.    Respiratory:  Positive for cough. Negative for wheezing.    Musculoskeletal:  Positive for muscle ache.   Neurological:  Positive for headaches.     Objective:      Physical Exam   Constitutional: She is oriented to person, place, and time. She appears well-developed. She is cooperative.  Non-toxic appearance. She does not appear ill. No distress.   HENT:   Head: Normocephalic and atraumatic.   Ears:   Right Ear: Hearing, tympanic membrane, external ear and ear canal normal.   Left Ear: Hearing, tympanic membrane, external ear and ear canal normal.   Nose: Mucosal edema present. No rhinorrhea or nasal deformity. No epistaxis. Right sinus exhibits no maxillary sinus tenderness and no frontal sinus tenderness. Left sinus exhibits no maxillary sinus tenderness and no frontal sinus tenderness.   Mouth/Throat: Uvula is midline and mucous membranes are normal. No trismus in the jaw. Normal dentition. No uvula swelling. Posterior oropharyngeal edema and posterior oropharyngeal erythema present. No oropharyngeal  exudate. Tonsils are 1+ on the right. Tonsils are 1+ on the left.   Eyes: Conjunctivae and lids are normal. No scleral icterus.   Neck: Trachea normal and phonation normal. Neck supple. No edema present. No erythema present. No neck rigidity present.   Cardiovascular: Normal rate, regular rhythm, normal heart sounds and normal pulses.   Pulmonary/Chest: Effort normal and breath sounds normal. No respiratory distress. She has no decreased breath sounds. She has no rhonchi.   Dry cough present during exam         Comments: Dry cough present during exam    Abdominal: Normal appearance.   Musculoskeletal: Normal range of motion.         General: No deformity. Normal range of motion.   Neurological: She is alert and oriented to person, place, and time. She exhibits normal muscle tone. Coordination normal.   Skin: Skin is warm, dry, intact, not diaphoretic and not pale.   Psychiatric: Her speech is normal and behavior is normal. Judgment and thought content normal.   Nursing note and vitals reviewed.      Assessment:       1. Cough, unspecified type    2. Exposure to the flu    3. Bronchitis          Plan:     Follow up with PCP PRN. RTC for worsening s/s.     Cough, unspecified type  -     POCT COVID-19 Rapid Screening    Exposure to the flu  -     POCT Influenza A/B MOLECULAR    Bronchitis    Other orders  -     promethazine-dextromethorphan (PROMETHAZINE-DM) 6.25-15 mg/5 mL Syrp; Take 5 mLs by mouth nightly as needed (cough).  Dispense: 120 mL; Refill: 0  -     benzonatate (TESSALON) 200 MG capsule; Take 1 capsule (200 mg total) by mouth 3 (three) times daily as needed for Cough.  Dispense: 30 capsule; Refill: 0  -     azithromycin (Z-NICOLAS) 250 MG tablet; Take 2 tablets by mouth on day 1; Take 1 tablet by mouth on days 2-5  Dispense: 6 tablet; Refill: 0

## 2023-05-22 RX ORDER — NORETHINDRONE ACETATE AND ETHINYL ESTRADIOL 1MG-20(21)
KIT ORAL
Qty: 84 TABLET | Refills: 4 | OUTPATIENT
Start: 2023-05-22

## 2023-06-01 ENCOUNTER — OFFICE VISIT (OUTPATIENT)
Dept: URGENT CARE | Facility: CLINIC | Age: 48
End: 2023-06-01
Payer: MEDICAID

## 2023-06-01 VITALS
WEIGHT: 250 LBS | HEART RATE: 97 BPM | OXYGEN SATURATION: 96 % | TEMPERATURE: 98 F | DIASTOLIC BLOOD PRESSURE: 87 MMHG | SYSTOLIC BLOOD PRESSURE: 135 MMHG | BODY MASS INDEX: 56.24 KG/M2 | RESPIRATION RATE: 20 BRPM | HEIGHT: 56 IN

## 2023-06-01 DIAGNOSIS — J02.9 SORE THROAT: Primary | ICD-10-CM

## 2023-06-01 DIAGNOSIS — J02.9 ACUTE PHARYNGITIS, UNSPECIFIED ETIOLOGY: ICD-10-CM

## 2023-06-01 DIAGNOSIS — J06.9 URI, ACUTE: ICD-10-CM

## 2023-06-01 LAB
CTP QC/QA: YES
CTP QC/QA: YES
MOLECULAR STREP A: NEGATIVE
SARS-COV-2 AG RESP QL IA.RAPID: NEGATIVE

## 2023-06-01 PROCEDURE — 87811 SARS-COV-2 COVID19 W/OPTIC: CPT | Mod: QW,S$GLB,, | Performed by: FAMILY MEDICINE

## 2023-06-01 PROCEDURE — 87811 SARS CORONAVIRUS 2 ANTIGEN POCT, MANUAL READ: ICD-10-PCS | Mod: QW,S$GLB,, | Performed by: FAMILY MEDICINE

## 2023-06-01 PROCEDURE — 99213 PR OFFICE/OUTPT VISIT, EST, LEVL III, 20-29 MIN: ICD-10-PCS | Mod: S$GLB,,, | Performed by: FAMILY MEDICINE

## 2023-06-01 PROCEDURE — 87651 POCT STREP A MOLECULAR: ICD-10-PCS | Mod: QW,S$GLB,, | Performed by: FAMILY MEDICINE

## 2023-06-01 PROCEDURE — 99213 OFFICE O/P EST LOW 20 MIN: CPT | Mod: S$GLB,,, | Performed by: FAMILY MEDICINE

## 2023-06-01 PROCEDURE — 87651 STREP A DNA AMP PROBE: CPT | Mod: QW,S$GLB,, | Performed by: FAMILY MEDICINE

## 2023-06-01 RX ORDER — IBUPROFEN 600 MG/1
600 TABLET ORAL EVERY 6 HOURS PRN
Qty: 30 TABLET | Refills: 1 | Status: SHIPPED | OUTPATIENT
Start: 2023-06-01

## 2023-06-01 RX ORDER — AMOXICILLIN 875 MG/1
875 TABLET, FILM COATED ORAL 2 TIMES DAILY
Qty: 20 TABLET | Refills: 0 | Status: SHIPPED | OUTPATIENT
Start: 2023-06-01 | End: 2023-06-11

## 2023-06-01 RX ORDER — DEXAMETHASONE SODIUM PHOSPHATE 100 MG/10ML
10 INJECTION INTRAMUSCULAR; INTRAVENOUS
Status: COMPLETED | OUTPATIENT
Start: 2023-06-01 | End: 2023-06-01

## 2023-06-01 RX ADMIN — DEXAMETHASONE SODIUM PHOSPHATE 10 MG: 100 INJECTION INTRAMUSCULAR; INTRAVENOUS at 03:06

## 2023-06-01 NOTE — PROGRESS NOTES
"Subjective:      Patient ID: Sydnee Don is a 48 y.o. female.    Vitals:  height is 4' 8" (1.422 m) and weight is 113.4 kg (250 lb). Her temperature is 98.2 °F (36.8 °C). Her blood pressure is 135/87 and her pulse is 97. Her respiration is 20 and oxygen saturation is 96%.     Chief Complaint: Cough    48 year old female presents today with a fever of 100.8, cough, sore throat, earache, HA, body aches, post nasal drip. Symptoms started 05/26/2023. Treatments at home include Mucinex and Vicks night time with no relief. No known exposure to anything. Close exposure to Strep. COVID vaccinated  Pt states exposed to SON with strep  Hurts to swallow, minimal cough      Cough  This is a new problem. The current episode started in the past 7 days. The problem has been gradually worsening. The cough is Non-productive. Associated symptoms include a fever, headaches, myalgias, postnasal drip, rhinorrhea and a sore throat. Pertinent negatives include no chest pain, chills, ear congestion, ear pain, heartburn, hemoptysis, nasal congestion, rash, shortness of breath, sweats, weight loss or wheezing. She has tried OTC cough suppressant for the symptoms. The treatment provided no relief. There is no history of asthma, bronchiectasis, bronchitis, COPD, emphysema, environmental allergies or pneumonia.     Constitution: Positive for fever. Negative for chills.   HENT:  Positive for postnasal drip and sore throat. Negative for ear pain.    Cardiovascular:  Negative for chest pain.   Respiratory:  Positive for cough. Negative for bloody sputum, shortness of breath and wheezing.    Gastrointestinal:  Negative for heartburn.   Musculoskeletal:  Positive for muscle ache.   Skin:  Negative for rash.   Allergic/Immunologic: Negative for environmental allergies.   Neurological:  Positive for headaches.    Objective:     Physical Exam   Constitutional: She is oriented to person, place, and time. She appears well-developed. She is " cooperative.   HENT:   Head: Normocephalic and atraumatic.   Ears:   Right Ear: Hearing, tympanic membrane, external ear and ear canal normal.   Left Ear: Hearing, tympanic membrane, external ear and ear canal normal.   Nose: Nose normal. No mucosal edema or nasal deformity. No epistaxis. Right sinus exhibits no maxillary sinus tenderness and no frontal sinus tenderness. Left sinus exhibits no maxillary sinus tenderness and no frontal sinus tenderness.   Mouth/Throat: Uvula is midline and mucous membranes are normal. Mucous membranes are moist. No trismus in the jaw. Normal dentition. No uvula swelling. Oropharyngeal exudate present.      Comments: Enlarged ., slightly exudative tonsils    Eyes: Conjunctivae and lids are normal. Pupils are equal, round, and reactive to light. Extraocular movement intact   Neck: Trachea normal and phonation normal. Neck supple.   Cardiovascular: Normal rate, regular rhythm, normal heart sounds and normal pulses.   Pulmonary/Chest: Effort normal and breath sounds normal.   Abdominal: Normal appearance and bowel sounds are normal. Soft.   Musculoskeletal: Normal range of motion.         General: Normal range of motion.   Neurological: She is alert and oriented to person, place, and time. She exhibits normal muscle tone.   Skin: Skin is warm, dry and intact.   Psychiatric: Her speech is normal and behavior is normal. Judgment and thought content normal.   Nursing note and vitals reviewed.    Assessment:     1. Sore throat    2. URI, acute    3. Acute pharyngitis, unspecified etiology        Plan:       Sore throat  -     POCT Strep A, Molecular  -     SARS Coronavirus 2 Antigen, POCT Manual Read    URI, acute    Acute pharyngitis, unspecified etiology    Other orders  -     amoxicillin (AMOXIL) 875 MG tablet; Take 1 tablet (875 mg total) by mouth 2 (two) times daily. for 10 days  Dispense: 20 tablet; Refill: 0  -     ibuprofen (ADVIL,MOTRIN) 600 MG tablet; Take 1 tablet (600 mg total)  by mouth every 6 (six) hours as needed for Pain.  Dispense: 30 tablet; Refill: 1        Pt advised to gargle with warm salt water q 4 hours  Tylenol alternate with Ibuprofen 2 po q 6 hours prn  Clraitin one daily

## 2023-06-12 ENCOUNTER — PATIENT MESSAGE (OUTPATIENT)
Dept: OBSTETRICS AND GYNECOLOGY | Facility: CLINIC | Age: 48
End: 2023-06-12
Payer: MEDICAID

## 2023-06-15 ENCOUNTER — PATIENT MESSAGE (OUTPATIENT)
Dept: OBSTETRICS AND GYNECOLOGY | Facility: CLINIC | Age: 48
End: 2023-06-15
Payer: MEDICAID

## 2023-06-16 NOTE — TELEPHONE ENCOUNTER
----- Message from Marcos Suárez sent at 6/16/2023 12:25 PM CDT -----  Contact: pt  .Type:  Needs Medical Advice    Who Called: pt    Pharmacy name and phone #:  [id number] - [place] - [address] AT [facility] [address]   Phone: [phone]  Fax:  [phone]  Would the patient rather a call back or a response via [facility]?  Call back  Best Call Back Number: [phone]   Additional Information: Shantelle is requesting an refill on her norethindrone-ethinyl estradiol (BLISOVI FE 1/20, 28,) 1 mg-20 mcg (21)/75 mg (7) per tablet

## 2023-06-17 RX ORDER — NORETHINDRONE ACETATE AND ETHINYL ESTRADIOL 1MG-20(21)
1 KIT ORAL DAILY
Qty: 84 TABLET | Refills: 0 | Status: SHIPPED | OUTPATIENT
Start: 2023-06-17 | End: 2023-06-19 | Stop reason: SDUPTHER

## 2023-06-19 RX ORDER — NORETHINDRONE ACETATE AND ETHINYL ESTRADIOL 1MG-20(21)
1 KIT ORAL DAILY
Qty: 84 TABLET | Refills: 0 | Status: SHIPPED | OUTPATIENT
Start: 2023-06-19 | End: 2023-07-07 | Stop reason: SDUPTHER

## 2023-06-20 RX ORDER — NORETHINDRONE ACETATE AND ETHINYL ESTRADIOL 1MG-20(21)
1 KIT ORAL DAILY
Qty: 84 TABLET | Refills: 0 | Status: SHIPPED | OUTPATIENT
Start: 2023-06-20 | End: 2023-07-07

## 2023-06-27 ENCOUNTER — PATIENT MESSAGE (OUTPATIENT)
Dept: OBSTETRICS AND GYNECOLOGY | Facility: CLINIC | Age: 48
End: 2023-06-27
Payer: MEDICAID

## 2023-07-07 ENCOUNTER — OFFICE VISIT (OUTPATIENT)
Dept: OBSTETRICS AND GYNECOLOGY | Facility: CLINIC | Age: 48
End: 2023-07-07
Payer: MEDICAID

## 2023-07-07 VITALS
DIASTOLIC BLOOD PRESSURE: 85 MMHG | WEIGHT: 256.81 LBS | BODY MASS INDEX: 57.58 KG/M2 | SYSTOLIC BLOOD PRESSURE: 135 MMHG

## 2023-07-07 DIAGNOSIS — Z01.419 ENCOUNTER FOR ANNUAL ROUTINE GYNECOLOGICAL EXAMINATION: Primary | ICD-10-CM

## 2023-07-07 DIAGNOSIS — Z11.3 SCREENING EXAMINATION FOR STD (SEXUALLY TRANSMITTED DISEASE): ICD-10-CM

## 2023-07-07 DIAGNOSIS — Z12.4 PAP SMEAR FOR CERVICAL CANCER SCREENING: ICD-10-CM

## 2023-07-07 DIAGNOSIS — Z12.11 COLON CANCER SCREENING: ICD-10-CM

## 2023-07-07 PROCEDURE — 1159F PR MEDICATION LIST DOCUMENTED IN MEDICAL RECORD: ICD-10-PCS | Mod: CPTII,,, | Performed by: OBSTETRICS & GYNECOLOGY

## 2023-07-07 PROCEDURE — 3079F DIAST BP 80-89 MM HG: CPT | Mod: CPTII,,, | Performed by: OBSTETRICS & GYNECOLOGY

## 2023-07-07 PROCEDURE — 1160F RVW MEDS BY RX/DR IN RCRD: CPT | Mod: CPTII,,, | Performed by: OBSTETRICS & GYNECOLOGY

## 2023-07-07 PROCEDURE — 99999 PR PBB SHADOW E&M-EST. PATIENT-LVL III: CPT | Mod: PBBFAC,,, | Performed by: OBSTETRICS & GYNECOLOGY

## 2023-07-07 PROCEDURE — 1159F MED LIST DOCD IN RCRD: CPT | Mod: CPTII,,, | Performed by: OBSTETRICS & GYNECOLOGY

## 2023-07-07 PROCEDURE — 88175 CYTOPATH C/V AUTO FLUID REDO: CPT | Performed by: OBSTETRICS & GYNECOLOGY

## 2023-07-07 PROCEDURE — 87591 N.GONORRHOEAE DNA AMP PROB: CPT | Performed by: OBSTETRICS & GYNECOLOGY

## 2023-07-07 PROCEDURE — 99396 PREV VISIT EST AGE 40-64: CPT | Mod: S$PBB,,, | Performed by: OBSTETRICS & GYNECOLOGY

## 2023-07-07 PROCEDURE — 3008F BODY MASS INDEX DOCD: CPT | Mod: CPTII,,, | Performed by: OBSTETRICS & GYNECOLOGY

## 2023-07-07 PROCEDURE — 3079F PR MOST RECENT DIASTOLIC BLOOD PRESSURE 80-89 MM HG: ICD-10-PCS | Mod: CPTII,,, | Performed by: OBSTETRICS & GYNECOLOGY

## 2023-07-07 PROCEDURE — 3075F SYST BP GE 130 - 139MM HG: CPT | Mod: CPTII,,, | Performed by: OBSTETRICS & GYNECOLOGY

## 2023-07-07 PROCEDURE — 99213 OFFICE O/P EST LOW 20 MIN: CPT | Mod: PBBFAC,PO | Performed by: OBSTETRICS & GYNECOLOGY

## 2023-07-07 PROCEDURE — 3008F PR BODY MASS INDEX (BMI) DOCUMENTED: ICD-10-PCS | Mod: CPTII,,, | Performed by: OBSTETRICS & GYNECOLOGY

## 2023-07-07 PROCEDURE — 99396 PR PREVENTIVE VISIT,EST,40-64: ICD-10-PCS | Mod: S$PBB,,, | Performed by: OBSTETRICS & GYNECOLOGY

## 2023-07-07 PROCEDURE — 87624 HPV HI-RISK TYP POOLED RSLT: CPT | Performed by: OBSTETRICS & GYNECOLOGY

## 2023-07-07 PROCEDURE — 1160F PR REVIEW ALL MEDS BY PRESCRIBER/CLIN PHARMACIST DOCUMENTED: ICD-10-PCS | Mod: CPTII,,, | Performed by: OBSTETRICS & GYNECOLOGY

## 2023-07-07 PROCEDURE — 3075F PR MOST RECENT SYSTOLIC BLOOD PRESS GE 130-139MM HG: ICD-10-PCS | Mod: CPTII,,, | Performed by: OBSTETRICS & GYNECOLOGY

## 2023-07-07 PROCEDURE — 99999 PR PBB SHADOW E&M-EST. PATIENT-LVL III: ICD-10-PCS | Mod: PBBFAC,,, | Performed by: OBSTETRICS & GYNECOLOGY

## 2023-07-07 RX ORDER — NORETHINDRONE ACETATE AND ETHINYL ESTRADIOL 1MG-20(21)
1 KIT ORAL DAILY
Qty: 84 TABLET | Refills: 4 | Status: SHIPPED | OUTPATIENT
Start: 2023-07-07

## 2023-07-07 NOTE — PROGRESS NOTES
"Chief Complaint   Patient presents with    Gynecologic Exam       HISTORY OF PRESENT ILLNESS:   Sydnee Don is a 48 y.o. female  who presents for well woman exam.  Patient's last menstrual period was 2023 (approximate)..  She saw urogyn and the leaking is better, taking medications for the urgency and now only leaking when coughs too hard so much improved.  Cycles are "regular. Desires STD testing but declines blood work. Desires  ocps  for birth control but is thinking about a tubal ligation. She has noticed more SOB.      Past Medical History:   Diagnosis Date    Anxiety and depression 2021    Cervical radiculopathy     Iron deficiency anemia due to chronic blood loss 2018    Rosacea 2021    Stress incontinence           Past Surgical History:   Procedure Laterality Date     SECTION           Social History     Socioeconomic History    Marital status:    Tobacco Use    Smoking status: Never     Passive exposure: Never    Smokeless tobacco: Never   Substance and Sexual Activity    Alcohol use: No    Drug use: No    Sexual activity: Yes     Partners: Male     Birth control/protection: OCP       Family History   Problem Relation Age of Onset    Hyperlipidemia Father     Hypertension Father     Hyperlipidemia Mother     Hypertension Mother     Arthritis Mother     Breast cancer Paternal Aunt          OB History    Para Term  AB Living   7 4 4 0 3 4   SAB IAB Ectopic Multiple Live Births   2       4      # Outcome Date GA Lbr Gabino/2nd Weight Sex Delivery Anes PTL Lv   7 SAB            6 Term     M CS-Unspec  N IVONNE   5 Term     M CS-Unspec  N IVONNE   4 SAB            3 Term     F   Y IVONNE   2 Term     F CS-Unspec  Y IVONNE   1 AB      SAB        COMPREHENSIVE GYN HISTORY:  PAP History: Denies abnormal Paps. 2018 NILM/HPV -  Infection History: Denies STDs. Denies PID.  Benign History: Denies uterine fibroids. Denies ovarian cysts. Denies endometriosis.   Cancer History: " Denies cervical cancer. Denies uterine cancer or hyperplasia. Denies ovarian cancer. Denies vulvar cancer or pre-cancer. Denies vaginal cancer or pre-cancer. Denies breast cancer. Denies colon cancer.  Sexual Activity History:   reports that she currently engages in sexual activity and has had male partners.   Menstrual History: Age of menarche:11 years. Every mon, flows for 5d days. moderate flow.  Dysmenorrhea History: Reports mild dysmenorrhea.  Contraception: none    ROS:  Negative       /85   Wt 116.5 kg (256 lb 13.4 oz)   LMP 06/05/2023 (Approximate)   BMI 57.58 kg/m²      APPEARANCE: Well nourished, well developed, in no acute distress.  NECK: Neck symmetric   ABDOMEN: Soft. No tenderness or masses. No hernias. No hepatosplenomegaly. obese  BREASTS: Symmetrical, no skin changes or visible lesions. No palpable masses, nipple discharge or adenopathy bilaterally.  PELVIC:   VULVA: No lesions. Normal female genitalia.  URETHRAL MEATUS: Normal size and location, no lesions, no prolapse. + cough stress test  URETHRA: No masses, tenderness, prolapse or scarring.  VAGINA: Moist and well rugated, no discharge, no significant cystocele or rectocele.  CERVIX: No lesions and discharge.  UTERUS: Normal size, regular shape, mobile, non-tender, bladder base nontender.  ADNEXA: No masses or tenderness.        1. Encounter for annual routine gynecological examination    2. Pap smear for cervical cancer screening    3. Screening examination for STD (sexually transmitted disease)    4. Colon cancer screening          Plan:  1. Routine gyn annual  s/p normal breast exam and MMG ordered.   Pap with HPV cotesting done today   STD testing: GC/CT and affirm ordered   Counseled on contraception and desires ocps, will refill. Will let me know if desires BTL, medicaid consent signed today, we discussed that her weight may make the surgery more complex and may not be able to do surgery lsc. Would want pre-operative evaluation  by PCP for SOB before scheduling. Will let us know when she sees them.    Referral for colonoscopy sent         F/u in 1 yr or RPN

## 2023-07-10 LAB
C TRACH DNA SPEC QL NAA+PROBE: NOT DETECTED
N GONORRHOEA DNA SPEC QL NAA+PROBE: NOT DETECTED

## 2023-07-11 LAB
HPV HR 12 DNA SPEC QL NAA+PROBE: NEGATIVE
HPV16 AG SPEC QL: NEGATIVE
HPV18 DNA SPEC QL NAA+PROBE: NEGATIVE

## 2023-07-12 ENCOUNTER — PATIENT MESSAGE (OUTPATIENT)
Dept: OBSTETRICS AND GYNECOLOGY | Facility: CLINIC | Age: 48
End: 2023-07-12
Payer: MEDICAID

## 2023-07-12 ENCOUNTER — PATIENT MESSAGE (OUTPATIENT)
Dept: OBSTETRICS AND GYNECOLOGY | Facility: HOSPITAL | Age: 48
End: 2023-07-12
Payer: MEDICAID

## 2023-07-12 LAB
FINAL PATHOLOGIC DIAGNOSIS: NORMAL
Lab: NORMAL

## 2023-08-21 ENCOUNTER — TELEPHONE (OUTPATIENT)
Dept: GASTROENTEROLOGY | Facility: CLINIC | Age: 48
End: 2023-08-21
Payer: MEDICAID

## 2023-08-21 NOTE — TELEPHONE ENCOUNTER
----- Message from Ami Mcdermott sent at 7/7/2023 11:03 AM CDT -----  Good morning,    The patient have a referral from her GYN with a diagnosis of Colon cancer screening. Please assist with scheduling the patient.    Thank You

## 2023-09-05 ENCOUNTER — PATIENT MESSAGE (OUTPATIENT)
Dept: OBSTETRICS AND GYNECOLOGY | Facility: CLINIC | Age: 48
End: 2023-09-05
Payer: MEDICAID

## 2023-09-05 DIAGNOSIS — Z12.31 VISIT FOR SCREENING MAMMOGRAM: Primary | ICD-10-CM

## 2023-09-14 ENCOUNTER — HOSPITAL ENCOUNTER (OUTPATIENT)
Dept: RADIOLOGY | Facility: HOSPITAL | Age: 48
Discharge: HOME | End: 2023-09-14
Attending: OBSTETRICS & GYNECOLOGY
Payer: MEDICAID

## 2023-09-14 DIAGNOSIS — Z12.31 VISIT FOR SCREENING MAMMOGRAM: ICD-10-CM

## 2023-09-14 PROCEDURE — 77067 SCR MAMMO BI INCL CAD: CPT | Mod: TC

## 2023-09-14 PROCEDURE — 77063 BREAST TOMOSYNTHESIS BI: CPT | Mod: 26,,, | Performed by: RADIOLOGY

## 2023-09-14 PROCEDURE — 77067 MAMMO DIGITAL SCREENING BILAT WITH TOMO: ICD-10-PCS | Mod: 26,,, | Performed by: RADIOLOGY

## 2023-09-14 PROCEDURE — 77063 MAMMO DIGITAL SCREENING BILAT WITH TOMO: ICD-10-PCS | Mod: 26,,, | Performed by: RADIOLOGY

## 2023-09-14 PROCEDURE — 77067 SCR MAMMO BI INCL CAD: CPT | Mod: 26,,, | Performed by: RADIOLOGY

## 2023-10-10 ENCOUNTER — TELEPHONE (OUTPATIENT)
Dept: GASTROENTEROLOGY | Facility: CLINIC | Age: 48
End: 2023-10-10
Payer: MEDICAID

## 2023-10-11 ENCOUNTER — LAB VISIT (OUTPATIENT)
Dept: LAB | Facility: HOSPITAL | Age: 48
End: 2023-10-11
Payer: MEDICAID

## 2023-10-11 ENCOUNTER — OFFICE VISIT (OUTPATIENT)
Dept: FAMILY MEDICINE | Facility: HOSPITAL | Age: 48
End: 2023-10-11
Payer: MEDICAID

## 2023-10-11 VITALS
DIASTOLIC BLOOD PRESSURE: 86 MMHG | BODY MASS INDEX: 58.28 KG/M2 | WEIGHT: 259.06 LBS | SYSTOLIC BLOOD PRESSURE: 139 MMHG | HEIGHT: 56 IN | HEART RATE: 85 BPM

## 2023-10-11 DIAGNOSIS — Z12.11 ENCOUNTER FOR SCREENING FOR MALIGNANT NEOPLASM OF COLON: ICD-10-CM

## 2023-10-11 DIAGNOSIS — E66.2 OBESITY HYPOVENTILATION SYNDROME: ICD-10-CM

## 2023-10-11 DIAGNOSIS — R06.83 SNORES: ICD-10-CM

## 2023-10-11 DIAGNOSIS — Z76.89 ENCOUNTER TO ESTABLISH CARE WITH NEW DOCTOR: ICD-10-CM

## 2023-10-11 DIAGNOSIS — R53.83 FATIGUE, UNSPECIFIED TYPE: ICD-10-CM

## 2023-10-11 DIAGNOSIS — R06.02 SHORTNESS OF BREATH: Primary | ICD-10-CM

## 2023-10-11 LAB
ALBUMIN SERPL BCP-MCNC: 3.6 G/DL (ref 3.5–5.2)
ALP SERPL-CCNC: 72 U/L (ref 55–135)
ALT SERPL W/O P-5'-P-CCNC: 20 U/L (ref 10–44)
ANION GAP SERPL CALC-SCNC: 11 MMOL/L (ref 8–16)
AST SERPL-CCNC: 18 U/L (ref 10–40)
BASOPHILS # BLD AUTO: 0.04 K/UL (ref 0–0.2)
BASOPHILS NFR BLD: 0.6 % (ref 0–1.9)
BILIRUB SERPL-MCNC: 0.4 MG/DL (ref 0.1–1)
BUN SERPL-MCNC: 8 MG/DL (ref 6–20)
CALCIUM SERPL-MCNC: 9.3 MG/DL (ref 8.7–10.5)
CHLORIDE SERPL-SCNC: 106 MMOL/L (ref 95–110)
CHOLEST SERPL-MCNC: 201 MG/DL (ref 120–199)
CHOLEST/HDLC SERPL: 3.7 {RATIO} (ref 2–5)
CO2 SERPL-SCNC: 20 MMOL/L (ref 23–29)
CREAT SERPL-MCNC: 0.7 MG/DL (ref 0.5–1.4)
DIFFERENTIAL METHOD: NORMAL
EOSINOPHIL # BLD AUTO: 0.1 K/UL (ref 0–0.5)
EOSINOPHIL NFR BLD: 1.2 % (ref 0–8)
ERYTHROCYTE [DISTWIDTH] IN BLOOD BY AUTOMATED COUNT: 13.8 % (ref 11.5–14.5)
EST. GFR  (NO RACE VARIABLE): >60 ML/MIN/1.73 M^2
ESTIMATED AVG GLUCOSE: 108 MG/DL (ref 68–131)
GLUCOSE SERPL-MCNC: 111 MG/DL (ref 70–110)
HBA1C MFR BLD: 5.4 % (ref 4–5.6)
HCT VFR BLD AUTO: 45.5 % (ref 37–48.5)
HDLC SERPL-MCNC: 54 MG/DL (ref 40–75)
HDLC SERPL: 26.9 % (ref 20–50)
HGB BLD-MCNC: 14.6 G/DL (ref 12–16)
IMM GRANULOCYTES # BLD AUTO: 0.02 K/UL (ref 0–0.04)
IMM GRANULOCYTES NFR BLD AUTO: 0.3 % (ref 0–0.5)
LDLC SERPL CALC-MCNC: 120 MG/DL (ref 63–159)
LYMPHOCYTES # BLD AUTO: 2.7 K/UL (ref 1–4.8)
LYMPHOCYTES NFR BLD: 36.8 % (ref 18–48)
MCH RBC QN AUTO: 28 PG (ref 27–31)
MCHC RBC AUTO-ENTMCNC: 32.1 G/DL (ref 32–36)
MCV RBC AUTO: 87 FL (ref 82–98)
MONOCYTES # BLD AUTO: 0.5 K/UL (ref 0.3–1)
MONOCYTES NFR BLD: 6.3 % (ref 4–15)
NEUTROPHILS # BLD AUTO: 4 K/UL (ref 1.8–7.7)
NEUTROPHILS NFR BLD: 54.8 % (ref 38–73)
NONHDLC SERPL-MCNC: 147 MG/DL
NRBC BLD-RTO: 0 /100 WBC
PLATELET # BLD AUTO: 256 K/UL (ref 150–450)
PLATELET BLD QL SMEAR: NORMAL
PMV BLD AUTO: 10.8 FL (ref 9.2–12.9)
POTASSIUM SERPL-SCNC: 4.4 MMOL/L (ref 3.5–5.1)
PROT SERPL-MCNC: 7.4 G/DL (ref 6–8.4)
RBC # BLD AUTO: 5.22 M/UL (ref 4–5.4)
SODIUM SERPL-SCNC: 137 MMOL/L (ref 136–145)
TRIGL SERPL-MCNC: 135 MG/DL (ref 30–150)
TSH SERPL DL<=0.005 MIU/L-ACNC: 2.44 UIU/ML (ref 0.4–4)
WBC # BLD AUTO: 7.26 K/UL (ref 3.9–12.7)

## 2023-10-11 PROCEDURE — 85025 COMPLETE CBC W/AUTO DIFF WBC: CPT

## 2023-10-11 PROCEDURE — 99214 OFFICE O/P EST MOD 30 MIN: CPT

## 2023-10-11 PROCEDURE — 80053 COMPREHEN METABOLIC PANEL: CPT

## 2023-10-11 PROCEDURE — 36415 COLL VENOUS BLD VENIPUNCTURE: CPT

## 2023-10-11 PROCEDURE — 83036 HEMOGLOBIN GLYCOSYLATED A1C: CPT

## 2023-10-11 PROCEDURE — 80061 LIPID PANEL: CPT

## 2023-10-11 PROCEDURE — 84443 ASSAY THYROID STIM HORMONE: CPT

## 2023-10-11 RX ORDER — SOLIFENACIN SUCCINATE 10 MG/1
10 TABLET, FILM COATED ORAL
COMMUNITY

## 2023-10-11 NOTE — PROGRESS NOTES
"  [facility] FAMILY PRACTICE CLINIC NOTE  New Patient Visit      SUBJECTIVE:     Patient: Sydnee Don is a 48 y.o. female.    Chief Compliant:   Chief Complaint   Patient presents with    Establish Care    Fatigue       History of Present Illness:  Past patient of [facility] clinic. Presents to re-[facility] care.    Endorses fatigue throughout day. Reports getting 7-8 hours sleep a night. Denies sleep study or known HARJEET. Reports snoring at night.    Endorses shortness of breath with activities. Denies worse with laying flat or leg swelling.  Denies history of heart conditions and denies smoking history.    Endorses does not want to do colonoscopy in concern of "risk of injury during procedure."      Screenings:  Last HgbA1C - 5.3 ()  Last colonoscopy - unknown  Last Hep C screening - negative  Last HIV screening - negative  Last pap smear - negative ()  Last mamogram - negative ()    Social History:  Smoker - denies  EtOH use - never  Drug use - Patient denies illicit drug use   Diet - 2 small meals daily, breakfast and dinner   Exercise - never  Weight - has been stable  Sleep - sleeping well    Review of patient's allergies indicates:  No Known Allergies    Past Medical History:   Diagnosis Date    Anxiety and depression 2021    Cervical radiculopathy     Iron deficiency anemia due to chronic blood loss 2018    Rosacea 2021    Stress incontinence        Current Outpatient Medications   Medication Instructions    azithromycin (Z-NICOLAS) 250 MG tablet Take 2 tablets by mouth on day 1; Take 1 tablet by mouth on days 2-5<BR>    ibuprofen (ADVIL,MOTRIN) 600 mg, Oral, Every 6 hours PRN    norethindrone-ethinyl estradiol (BLISOVI FE , ,) 1 mg-20 mcg (21)/75 mg (7) per tablet 1 tablet, Oral, Daily    solifenacin (VESICARE) 10 mg, Oral       Past Surgical History:   Procedure Laterality Date     SECTION         Family History   Problem Relation Age of Onset    Hyperlipidemia Father     " "Hypertension Father     Hyperlipidemia Mother     Hypertension Mother     Arthritis Mother     Breast cancer Paternal Aunt        Social History     Tobacco Use    Smoking status: Never     Passive exposure: Never    Smokeless tobacco: Never   Substance Use Topics    Alcohol use: No    Drug use: No          Review of Systems   Constitutional:  Positive for malaise/fatigue. Negative for fever.   Respiratory:  Positive for shortness of breath. Negative for cough, sputum production and wheezing.    Cardiovascular:  Negative for chest pain and leg swelling.   Gastrointestinal:  Negative for abdominal pain.   Neurological:  Negative for headaches.     A 10+ review of systems was performed with pertinent positives and negatives noted above in the history of present illness. Other systems were negative unless otherwise specified.    OBJECTIVE:     Vital Signs (Most Recent)  Vitals:    10/11/23 0903   BP: 139/86   Pulse: 85   Weight: 117.5 kg (259 lb 0.7 oz)   Height: 4' 8" (1.422 m)     BMI: Body mass index is 58.08 kg/m².     Physical Exam:  Physical Exam  Vitals and nursing note reviewed.   Constitutional:       General: She is not in acute distress.     Appearance: Normal appearance. She is obese. She is not ill-appearing, toxic-appearing or diaphoretic.   HENT:      Head: Normocephalic.      Right Ear: External ear normal.      Left Ear: External ear normal.      Nose: Nose normal.      Mouth/Throat:      Pharynx: Oropharynx is clear.   Eyes:      Extraocular Movements: Extraocular movements intact.   Cardiovascular:      Rate and Rhythm: Normal rate and regular rhythm.      Pulses: Normal pulses.      Heart sounds: Normal heart sounds.   Pulmonary:      Effort: Pulmonary effort is normal. No respiratory distress.      Comments: No clinical signs of increased work of breathing, including patient speaking in complete sentences, no accessory muscle use, or tripoding.    Abdominal:      General: There is no distension.    "   Palpations: Abdomen is soft.      Tenderness: There is no abdominal tenderness. There is no rebound.   Musculoskeletal:         General: Normal range of motion.      Cervical back: Normal range of motion.   Skin:     General: Skin is warm.      Capillary Refill: Capillary refill takes less than 2 seconds.   Neurological:      General: No focal deficit present.      Mental Status: She is alert and oriented to person, place, and time. Mental status is at baseline.   Psychiatric:         Mood and Affect: Mood normal.         Behavior: Behavior normal.         Thought Content: Thought content normal.          ASSESSMENT:   Sydnee Don is a 48 y.o. female who presents to clinic to for    1. Encounter to establish care with new doctor    2. BMI 50.0-59.9, adult    3. Encounter for screening for malignant neoplasm of colon    4. Fatigue, unspecified type    5. Snores         PLAN:     Shortness of breath  - Symptoms likely multifactorial. OHS, HARJEET, lab abnormalities. Less likely HF given presentation.  -  Orders as below. Follow-up with results.  -     Ambulatory referral/consult to Sleep Disorders; Future; Expected date: 10/18/2023    Encounter to establish care with new doctor    BMI 50.0-59.9, adult  - Counseled patient on importance of exercising at least 150-300 minutes per week (i.e. at least 30 minutes, 3 days a week) of moderate physical activity.   - Counseled patient on the importance maintaining a healthy diet (including at least one-half of food eaten should be whole fruits and vegetables with the core elements of the other half of food  should include grains, whole grains, dairy, protein, and oils with lower saturated fat, as well as minimizing alcohol use and consumption of foods with added sugar, saturated fat, and sodium.)   -     Hemoglobin A1C; Future; Expected date: 10/11/2023  -     Lipid Panel; Future; Expected date: 10/11/2023  -     Comprehensive Metabolic Panel; Future; Expected date:  10/11/2023  -     CBC Auto Differential; Future; Expected date: 10/11/2023  -     TSH; Future; Expected date: 10/11/2023    Encounter for screening for malignant neoplasm of colon  -     Cologuard Screening (Multitarget Stool DNA); Future; Expected date: 10/11/2023    Fatigue, unspecified type  - As discussed in SOB.  -     Comprehensive Metabolic Panel; Future; Expected date: 10/11/2023  -     CBC Auto Differential; Future; Expected date: 10/11/2023  -     TSH; Future; Expected date: 10/11/2023    Snores  - As discussed in SOB.  -     Ambulatory referral/consult to Sleep Disorders; Future; Expected date: 10/18/2023    Obesity hypoventilation syndrome  - As discussed in SOB and BMI.  -     Ambulatory referral/consult to Sleep Disorders; Future; Expected date: 10/18/2023        Provided patient with anticipatory guidance and return precautions. Treatment plan discussed with patient, all questions answered, and patient acknowledged understanding and verbal agreement.      Follow-up in: 2-3 months; or sooner PRN if acute concerns arise.      Case discussed with Dr. DESHAWN Mina    ________________________  Rodrick Lal MD  [facility] PGY-2

## 2023-10-27 LAB — NONINV COLON CA DNA+OCC BLD SCRN STL QL: NEGATIVE

## 2023-10-28 ENCOUNTER — TELEPHONE (OUTPATIENT)
Dept: FAMILY MEDICINE | Facility: HOSPITAL | Age: 48
End: 2023-10-28
Payer: MEDICAID

## 2023-11-13 ENCOUNTER — HOSPITAL ENCOUNTER (OUTPATIENT)
Dept: CARDIOLOGY | Facility: HOSPITAL | Age: 48
Discharge: HOME | End: 2023-11-13
Payer: MEDICAID

## 2023-11-13 VITALS — WEIGHT: 259 LBS | HEIGHT: 56 IN | BODY MASS INDEX: 58.26 KG/M2

## 2023-11-13 DIAGNOSIS — R06.02 SHORTNESS OF BREATH: ICD-10-CM

## 2023-11-13 DIAGNOSIS — E66.2 OBESITY HYPOVENTILATION SYNDROME: ICD-10-CM

## 2023-11-13 LAB
ASCENDING AORTA: 2.2 CM
AV INDEX (PROSTH): 0.87
AV MEAN GRADIENT: 9 MMHG
AV PEAK GRADIENT: 16 MMHG
AV VALVE AREA BY VELOCITY RATIO: 1.87 CM²
AV VALVE AREA: 2.03 CM²
AV VELOCITY RATIO: 0.8
BSA FOR ECHO PROCEDURE: 2.15 M2
CV ECHO LV RWT: 0.46 CM
DOP CALC AO PEAK VEL: 1.98 M/S
DOP CALC AO VTI: 38 CM
DOP CALC LVOT AREA: 2.3 CM2
DOP CALC LVOT DIAMETER: 1.73 CM
DOP CALC LVOT PEAK VEL: 1.58 M/S
DOP CALC LVOT STROKE VOLUME: 77.3 CM3
DOP CALC MV VTI: 30.5 CM
DOP CALCLVOT PEAK VEL VTI: 32.9 CM
E WAVE DECELERATION TIME: 177.01 MSEC
E/A RATIO: 1.21
E/E' RATIO: 10.29 M/S
ECHO LV POSTERIOR WALL: 1.01 CM (ref 0.6–1.1)
FRACTIONAL SHORTENING: 43 % (ref 28–44)
INTERVENTRICULAR SEPTUM: 1.04 CM (ref 0.6–1.1)
IVC DIAMETER: 1.48 CM
LA MAJOR: 6.04 CM
LA MINOR: 5.67 CM
LA WIDTH: 3.5 CM
LEFT ATRIUM SIZE: 3.7 CM
LEFT ATRIUM VOLUME INDEX MOD: 26.8 ML/M2
LEFT ATRIUM VOLUME INDEX: 32.5 ML/M2
LEFT ATRIUM VOLUME MOD: 53.01 CM3
LEFT ATRIUM VOLUME: 64.38 CM3
LEFT INTERNAL DIMENSION IN SYSTOLE: 2.53 CM (ref 2.1–4)
LEFT VENTRICLE DIASTOLIC VOLUME INDEX: 44.21 ML/M2
LEFT VENTRICLE DIASTOLIC VOLUME: 87.53 ML
LEFT VENTRICLE MASS INDEX: 77 G/M2
LEFT VENTRICLE SYSTOLIC VOLUME INDEX: 11.6 ML/M2
LEFT VENTRICLE SYSTOLIC VOLUME: 22.99 ML
LEFT VENTRICULAR INTERNAL DIMENSION IN DIASTOLE: 4.4 CM (ref 3.5–6)
LEFT VENTRICULAR MASS: 152.98 G
LV LATERAL E/E' RATIO: 9.82 M/S
LV SEPTAL E/E' RATIO: 10.8 M/S
LVOT MG: 6.51 MMHG
LVOT MV: 1.23 CM/S
MV PEAK A VEL: 0.89 M/S
MV PEAK E VEL: 1.08 M/S
MV PEAK GRADIENT: 4 MMHG
MV STENOSIS PRESSURE HALF TIME: 51.33 MS
MV VALVE AREA BY CONTINUITY EQUATION: 2.53 CM2
MV VALVE AREA P 1/2 METHOD: 4.29 CM2
OHS LV EJECTION FRACTION SIMPSONS BIPLANE MOD: 65 %
PISA MRMAX VEL: 3 M/S
PISA TR MAX VEL: 2.63 M/S
PULM VEIN S/D RATIO: 1.7
PV PEAK D VEL: 0.44 M/S
PV PEAK GRADIENT: 12 MMHG
PV PEAK S VEL: 0.75 M/S
PV PEAK VELOCITY: 1.71 M/S
RA MAJOR: 5 CM
RA PRESSURE ESTIMATED: 3 MMHG
RA WIDTH: 2.79 CM
RIGHT VENTRICULAR END-DIASTOLIC DIMENSION: 2.88 CM
RV TB RVSP: 6 MMHG
RV TISSUE DOPPLER FREE WALL SYSTOLIC VELOCITY 1 (APICAL 4 CHAMBER VIEW): 16.32 CM/S
SINUS: 2.53 CM
STJ: 2.12 CM
TDI LATERAL: 0.11 M/S
TDI SEPTAL: 0.1 M/S
TDI: 0.11 M/S
TR MAX PG: 28 MMHG
TRICUSPID ANNULAR PLANE SYSTOLIC EXCURSION: 2.76 CM
TV REST PULMONARY ARTERY PRESSURE: 31 MMHG
Z-SCORE OF LEFT VENTRICULAR DIMENSION IN END DIASTOLE: -2.62
Z-SCORE OF LEFT VENTRICULAR DIMENSION IN END SYSTOLE: -2.59

## 2023-11-13 PROCEDURE — 93306 ECHO (CUPID ONLY): ICD-10-PCS | Mod: 26,,, | Performed by: INTERNAL MEDICINE

## 2023-11-13 PROCEDURE — 93306 TTE W/DOPPLER COMPLETE: CPT

## 2023-11-13 PROCEDURE — C8929 TTE W OR WO FOL WCON,DOPPLER: HCPCS

## 2023-11-13 PROCEDURE — 93306 TTE W/DOPPLER COMPLETE: CPT | Mod: 26,,, | Performed by: INTERNAL MEDICINE

## 2023-11-15 ENCOUNTER — HOSPITAL ENCOUNTER (OUTPATIENT)
Dept: PULMONOLOGY | Facility: HOSPITAL | Age: 48
Discharge: HOME | End: 2023-11-15
Payer: MEDICAID

## 2023-11-15 DIAGNOSIS — E66.2 OBESITY HYPOVENTILATION SYNDROME: ICD-10-CM

## 2023-11-15 DIAGNOSIS — R06.02 SHORTNESS OF BREATH: ICD-10-CM

## 2023-11-15 PROCEDURE — 94727 GAS DIL/WSHOT DETER LNG VOL: CPT

## 2023-11-15 PROCEDURE — 94010 BREATHING CAPACITY TEST: CPT

## 2023-11-15 PROCEDURE — 94729 DIFFUSING CAPACITY: CPT

## 2023-11-20 ENCOUNTER — PATIENT MESSAGE (OUTPATIENT)
Dept: FAMILY MEDICINE | Facility: HOSPITAL | Age: 48
End: 2023-11-20
Payer: MEDICAID

## 2023-11-30 NOTE — PROGRESS NOTES
I assume primary medical responsibility for this patient. I have reviewed the history, physical, and assessement & treatment plan with the resident and agree that the care is reasonable and necessary. This service has been performed by a resident without the presence of a teaching physician under the primary care exception. If necessary, an addendum of additional findings or evaluation beyond the resident documentation will be noted below.     Nicole Mina MD

## 2023-12-04 LAB
BRPFT: ABNORMAL
DLCO ADJ PRE: 15.06 ML/(MIN*MMHG) (ref 13.75–25.21)
DLCO SINGLE BREATH LLN: 13.75
DLCO SINGLE BREATH PRE REF: 80 %
DLCO SINGLE BREATH REF: 19.48
DLCOC SBVA LLN: 3.24
DLCOC SBVA PRE REF: 101.4 %
DLCOC SBVA REF: 5.44
DLCOC SINGLE BREATH LLN: 13.75
DLCOC SINGLE BREATH PRE REF: 77.3 %
DLCOC SINGLE BREATH REF: 19.48
DLCOVA LLN: 3.24
DLCOVA PRE REF: 105 %
DLCOVA PRE: 5.71 ML/(MIN*MMHG*L) (ref 3.24–7.63)
DLCOVA REF: 5.44
DLVAADJ PRE: 5.51 ML/(MIN*MMHG*L) (ref 3.24–7.63)
ERVN2 LLN: -16449.11
ERVN2 PRE REF: 51.4 %
ERVN2 PRE: 0.46 L (ref -16449.11–16450.89)
ERVN2 REF: 0.89
FEF 25 75 LLN: 1.31
FEF 25 75 PRE REF: 118.8 %
FEF 25 75 REF: 2.31
FEV1 FVC LLN: 71
FEV1 FVC PRE REF: 107.8 %
FEV1 FVC REF: 82
FEV1 LLN: 1.64
FEV1 PRE REF: 82.3 %
FEV1 REF: 2.1
FRCN2 LLN: 1.41
FRCN2 PRE REF: 120.2 %
FRCN2 REF: 2.23
FVC LLN: 2.02
FVC PRE REF: 76.3 %
FVC REF: 2.57
IVC PRE: 1.97 L (ref 2.02–3.12)
IVC SINGLE BREATH LLN: 2.02
IVC SINGLE BREATH PRE REF: 76.7 %
IVC SINGLE BREATH REF: 2.57
PEF LLN: 3.79
PEF PRE REF: 61 %
PEF REF: 5.32
PRE DLCO: 15.58 ML/(MIN*MMHG) (ref 13.75–25.21)
PRE FEF 25 75: 2.74 L/S (ref 1.31–3.3)
PRE FET 100: 7.27 SEC
PRE FEV1 FVC: 88.26 % (ref 70.65–93.15)
PRE FEV1: 1.73 L (ref 1.64–2.56)
PRE FRC N2: 2.68 L
PRE FVC: 1.96 L (ref 2.02–3.12)
PRE PEF: 3.25 L/S (ref 3.79–6.85)
RVN2 LLN: 0.76
RVN2 PRE REF: 126.2 %
RVN2 PRE: 1.69 L (ref 0.76–1.91)
RVN2 REF: 1.34
RVN2TLCN2 LLN: 25.69
RVN2TLCN2 PRE REF: 128 %
RVN2TLCN2 PRE: 45.16 % (ref 25.69–44.87)
RVN2TLCN2 REF: 35.28
TLCN2 LLN: 2.59
TLCN2 PRE REF: 104.4 %
TLCN2 PRE: 3.74 L (ref 2.6–4.57)
TLCN2 REF: 3.58
VA PRE: 2.73 L (ref 3.43–3.43)
VA SINGLE BREATH LLN: 3.43
VA SINGLE BREATH PRE REF: 79.6 %
VA SINGLE BREATH REF: 3.43
VCMAXN2 LLN: 2.02
VCMAXN2 PRE REF: 79.9 %
VCMAXN2 PRE: 2.05 L (ref 2.02–3.12)
VCMAXN2 REF: 2.57

## 2023-12-04 PROCEDURE — 94010 BREATHING CAPACITY TEST: CPT | Mod: 26,,, | Performed by: INTERNAL MEDICINE

## 2023-12-04 PROCEDURE — 94727 PR PULM FUNCTION TEST BY GAS: ICD-10-PCS | Mod: 26,,, | Performed by: INTERNAL MEDICINE

## 2023-12-04 PROCEDURE — 94727 GAS DIL/WSHOT DETER LNG VOL: CPT | Mod: 26,,, | Performed by: INTERNAL MEDICINE

## 2023-12-04 PROCEDURE — 94010 BREATHING CAPACITY TEST: ICD-10-PCS | Mod: 26,,, | Performed by: INTERNAL MEDICINE

## 2023-12-04 PROCEDURE — 94729 PR C02/MEMBANE DIFFUSE CAPACITY: ICD-10-PCS | Mod: 26,,, | Performed by: INTERNAL MEDICINE

## 2023-12-04 PROCEDURE — 94729 DIFFUSING CAPACITY: CPT | Mod: 26,,, | Performed by: INTERNAL MEDICINE

## 2024-01-11 ENCOUNTER — HOSPITAL ENCOUNTER (EMERGENCY)
Facility: HOSPITAL | Age: 49
Discharge: HOME | End: 2024-01-11
Attending: EMERGENCY MEDICINE
Payer: MEDICAID

## 2024-01-11 VITALS
BODY MASS INDEX: 56.24 KG/M2 | DIASTOLIC BLOOD PRESSURE: 86 MMHG | OXYGEN SATURATION: 96 % | RESPIRATION RATE: 18 BRPM | HEART RATE: 84 BPM | WEIGHT: 250 LBS | SYSTOLIC BLOOD PRESSURE: 160 MMHG | TEMPERATURE: 98 F | HEIGHT: 56 IN

## 2024-01-11 DIAGNOSIS — M54.32 SCIATICA OF LEFT SIDE: Primary | ICD-10-CM

## 2024-01-11 LAB
BACTERIA #/AREA URNS HPF: NORMAL /HPF
BILIRUB UR QL STRIP: NEGATIVE
CLARITY UR: CLEAR
COLOR UR: YELLOW
GLUCOSE UR QL STRIP: NEGATIVE
HGB UR QL STRIP: ABNORMAL
KETONES UR QL STRIP: NEGATIVE
LEUKOCYTE ESTERASE UR QL STRIP: NEGATIVE
MICROSCOPIC COMMENT: NORMAL
NITRITE UR QL STRIP: NEGATIVE
PH UR STRIP: 6 [PH] (ref 5–8)
PROT UR QL STRIP: ABNORMAL
RBC #/AREA URNS HPF: 2 /HPF (ref 0–4)
SP GR UR STRIP: 1.03 (ref 1–1.03)
SQUAMOUS #/AREA URNS HPF: 7 /HPF
URN SPEC COLLECT METH UR: ABNORMAL
UROBILINOGEN UR STRIP-ACNC: NEGATIVE EU/DL
WBC #/AREA URNS HPF: 2 /HPF (ref 0–5)

## 2024-01-11 PROCEDURE — 96374 THER/PROPH/DIAG INJ IV PUSH: CPT

## 2024-01-11 PROCEDURE — 63600175 PHARM REV CODE 636 W HCPCS

## 2024-01-11 PROCEDURE — 25000003 PHARM REV CODE 250

## 2024-01-11 PROCEDURE — 81000 URINALYSIS NONAUTO W/SCOPE: CPT

## 2024-01-11 PROCEDURE — 99284 EMERGENCY DEPT VISIT MOD MDM: CPT | Mod: 25

## 2024-01-11 RX ORDER — LIDOCAINE 50 MG/G
1 PATCH TOPICAL
Status: DISCONTINUED | OUTPATIENT
Start: 2024-01-11 | End: 2024-01-11 | Stop reason: HOSPADM

## 2024-01-11 RX ORDER — DICLOFENAC SODIUM 10 MG/G
2 GEL TOPICAL 4 TIMES DAILY
Qty: 20 G | Refills: 0 | Status: SHIPPED | OUTPATIENT
Start: 2024-01-11 | End: 2024-01-25

## 2024-01-11 RX ORDER — KETOROLAC TROMETHAMINE 30 MG/ML
15 INJECTION, SOLUTION INTRAMUSCULAR; INTRAVENOUS
Status: COMPLETED | OUTPATIENT
Start: 2024-01-11 | End: 2024-01-11

## 2024-01-11 RX ORDER — METHOCARBAMOL 500 MG/1
500 TABLET, FILM COATED ORAL 4 TIMES DAILY
Qty: 40 TABLET | Refills: 0 | Status: SHIPPED | OUTPATIENT
Start: 2024-01-11 | End: 2024-01-21

## 2024-01-11 RX ORDER — METHOCARBAMOL 500 MG/1
500 TABLET, FILM COATED ORAL
Status: COMPLETED | OUTPATIENT
Start: 2024-01-11 | End: 2024-01-11

## 2024-01-11 RX ADMIN — METHOCARBAMOL TABLETS 500 MG: 500 TABLET, COATED ORAL at 05:01

## 2024-01-11 RX ADMIN — KETOROLAC TROMETHAMINE 15 MG: 30 INJECTION INTRAMUSCULAR; INTRAVENOUS at 05:01

## 2024-01-11 RX ADMIN — LIDOCAINE 1 PATCH: 50 PATCH CUTANEOUS at 05:01

## 2024-01-11 NOTE — ED TRIAGE NOTES
Low back pain radiating down right leg x 2 days with no h/o injury. Presents awake, alert and using crutch to assist with weight bearing. Taking Ibuprofen without relief. No h/o back problems in the past. Denies fever or urinary symptoms.

## 2024-01-11 NOTE — Clinical Note
"Sydnee Tejadada" Arian was seen and treated in our emergency department on 1/11/2024.  She may return to work on 01/13/2024.       If you have any questions or concerns, please don't hesitate to call.      Sloane Armando PA-C"

## 2024-01-11 NOTE — DISCHARGE INSTRUCTIONS
Thank you for letting me care for you today - it was nice to meet you and I hope you feel better soon. Please return to the ER if your symptoms don't improve or get worse. And be sure to follow up with your primary care provider and with PM&R within the next week for follow up care.    Our goal at [facility] is to always give you outstanding care and exceptional service. You may receive a survey by mail or email in the next week about your experience in our ED. We would greatly appreciate you completing and returning the survey. Your feedback provides us with a way to recognize our staff who give very good care and it helps us learn how to improve when your experience was below our aspiration of excellence.     All the best,     Sloane Armando, MPH, PA-C  Emergency Department Physician Assistant  [facility], [facility]

## 2024-01-12 NOTE — ED PROVIDER NOTES
Encounter Date: 2024       History     Chief Complaint   Patient presents with    Back Pain     Lumbar pain that radiates to the right hip and right leg. Pain in the back started two days ago. Leg pain has been since the 2nd. Denies recent injury.     Patient is a 48 year old female who presents for evaluation of lower back pain that radiates down the left lateral leg X 2 days. There has been no recent trauma.  There is no numbness, weakness, incontinence, or retention reported.  Patient has not attempted treatment at home.  No history of IBD or malignancy.  Denies fever, chills, urinary complaints, flank pain, rashes, leg swelling, or any other complaints at this time.    The history is provided by the patient.     Review of patient's allergies indicates:  No Known Allergies  Past Medical History:   Diagnosis Date    Anxiety and depression 2021    Cervical radiculopathy     Iron deficiency anemia due to chronic blood loss 2018    Rosacea 2021    Stress incontinence      Past Surgical History:   Procedure Laterality Date     SECTION       Family History   Problem Relation Age of Onset    Hyperlipidemia Father     Hypertension Father     Hyperlipidemia Mother     Hypertension Mother     Arthritis Mother     Breast cancer Paternal Aunt      Social History     Tobacco Use    Smoking status: Never     Passive exposure: Never    Smokeless tobacco: Never   Substance Use Topics    Alcohol use: No    Drug use: No     Review of Systems   Constitutional:  Negative for chills and fever.   HENT:  Negative for congestion and sore throat.    Respiratory:  Negative for shortness of breath and wheezing.    Cardiovascular:  Negative for chest pain.   Gastrointestinal:  Negative for abdominal distention, abdominal pain, diarrhea, nausea and vomiting.   Genitourinary:  Negative for dysuria, flank pain, frequency, hematuria and urgency.   Musculoskeletal:  Positive for back pain. Negative for arthralgias,  joint swelling, myalgias, neck pain and neck stiffness.   Skin:  Negative for rash.   Neurological:  Negative for weakness.   Hematological:  Does not bruise/bleed easily.   All other systems reviewed and are negative.      Physical Exam     Initial Vitals [01/11/24 1620]   BP Pulse Resp Temp SpO2   (!) 160/86 84 18 98 °F (36.7 °C) 96 %      MAP       --         Physical Exam    Constitutional: She appears well-developed and well-nourished.   HENT:   Head: Normocephalic and atraumatic.   Eyes: EOM are normal.   Neck:   Normal range of motion.  Cardiovascular:  Normal rate and regular rhythm.           Pulmonary/Chest: Breath sounds normal.   Abdominal: Abdomen is soft. Bowel sounds are normal. She exhibits no distension. There is no abdominal tenderness. There is no rebound and no guarding.   Musculoskeletal:      Cervical back: Normal range of motion.      Lumbar back: Tenderness present. Positive left straight leg raise test.      Comments: Mild paraspinal tenderness noted to the lumbar spine, worse on the left side.  No midline tenderness, bony step-offs, deformities noted.  Patient is able to ambulate.  No numbness or weakness.  No leg swelling.  Neurovascularly intact.     Neurological: She is alert and oriented to person, place, and time.         ED Course   Procedures  Labs Reviewed   URINALYSIS, REFLEX TO URINE CULTURE - Abnormal; Notable for the following components:       Result Value    Protein, UA Trace (*)     Occult Blood UA 2+ (*)     All other components within normal limits    Narrative:     Specimen Source->Urine   URINALYSIS MICROSCOPIC    Narrative:     Specimen Source->Urine          Imaging Results    None          Medications   ketorolac injection 15 mg (15 mg Intravenous Given 1/11/24 1738)   methocarbamoL tablet 500 mg (500 mg Oral Given 1/11/24 1738)     Medical Decision Making  Patient is a afebrile, well appearing 48 y.o. female who presents for evaluation of lower back pain. Patient is  able to ambulate. Denies saddle anesthesia, loss of bowel or bladder control, or urinary retention. Pulses normal. BLE strength normal. Neurovascularly intact.     Differential Diagnosis includes, but is not limited to: Cauda equina syndrome, disc herniation, spinal stenosis, sciatica, radiculopathy, lumbar muscle strain     All historical, clinical findings were reviewed with patient. At present, the patient's mechanism of injury as well as the location leans heavily towards lumbar strain. Clinical picture consistent with benign musculoskeletal back pain without any red flags to indicate need for emergent imaging.  There are no findings worrisome for neurologic deficit or infection. No fevers/trauma/bowel/bladder dysfunction/leg weakness/hx of cancer or IVDA, exam w/o evidence to suggest cord compression, cauda equina or retroperitoneal process.     No further intervention is indicated at this time and I am of the belief that that it is safe to discharge the patient from the emergency department. Patient has been counseled regarding the need for follow-up as well as the indications to return to the emergency room should new or worrisome developments (including,but not limited to: worsening pain, saddle anesthesia, loss of bowel or bladder control, loss of strength or sensation) occur. Referral placed to PM&R. Additionally, patient instructed to follow up with PCP in 2-3 days for recheck of today's complaints.    Discharge and follow-up instructions discussed with the patient who expressed understanding and willingness to comply with recommendations. Patient discharged from the emergency department in stable condition, in no acute distress.     Amount and/or Complexity of Data Reviewed  Labs: ordered. Decision-making details documented in ED Course.    Risk  Prescription drug management.               ED Course as of 01/11/24 2251   Thu Jan 11, 2024   1709 Leukocytes, UA: Negative [OB]   1709 NITRITE UA: Negative  [OB]      ED Course User Index  [OB] Sloane Armando PA-C                           Clinical Impression:  Final diagnoses:  [M54.32] Sciatica of left side (Primary)          ED Disposition Condition    Discharge           ED Prescriptions       Medication Sig Dispense Start Date End Date Auth. Provider    diclofenac sodium (VOLTAREN) 1 % Gel Apply 2 g topically 4 (four) times daily. 20 g 1/11/2024 -- Sloane Armando PA-C    methocarbamoL (ROBAXIN) 500 MG Tab Take 1 tablet (500 mg total) by mouth 4 (four) times daily. for 10 days 40 tablet 1/11/2024 1/21/2024 Sloane Armando PA-C          Follow-up Information    None          Sloane Armando PA-C  01/11/24 8472

## 2024-01-17 ENCOUNTER — HOSPITAL ENCOUNTER (OUTPATIENT)
Dept: RADIOLOGY | Facility: HOSPITAL | Age: 49
Discharge: HOME | End: 2024-01-17
Payer: MEDICAID

## 2024-01-17 ENCOUNTER — OFFICE VISIT (OUTPATIENT)
Dept: FAMILY MEDICINE | Facility: HOSPITAL | Age: 49
End: 2024-01-17
Payer: MEDICAID

## 2024-01-17 VITALS
HEART RATE: 77 BPM | DIASTOLIC BLOOD PRESSURE: 102 MMHG | HEIGHT: 56 IN | WEIGHT: 260.13 LBS | SYSTOLIC BLOOD PRESSURE: 154 MMHG | BODY MASS INDEX: 58.52 KG/M2

## 2024-01-17 DIAGNOSIS — M25.552 HIP PAIN, LEFT: ICD-10-CM

## 2024-01-17 DIAGNOSIS — R03.0 ELEVATED BP WITHOUT DIAGNOSIS OF HYPERTENSION: ICD-10-CM

## 2024-01-17 DIAGNOSIS — M54.42 ACUTE LEFT-SIDED LOW BACK PAIN WITH LEFT-SIDED SCIATICA: Primary | ICD-10-CM

## 2024-01-17 DIAGNOSIS — E66.01 CLASS 3 SEVERE OBESITY WITH BODY MASS INDEX (BMI) OF 50.0 TO 59.9 IN ADULT, UNSPECIFIED OBESITY TYPE, UNSPECIFIED WHETHER SERIOUS COMORBIDITY PRESENT: ICD-10-CM

## 2024-01-17 DIAGNOSIS — E78.00 HYPERCHOLESTEROLEMIA: ICD-10-CM

## 2024-01-17 PROBLEM — M47.26 OTHER SPONDYLOSIS WITH RADICULOPATHY, LUMBAR REGION: Status: ACTIVE | Noted: 2024-01-17

## 2024-01-17 PROBLEM — E66.813 CLASS 3 SEVERE OBESITY WITH BODY MASS INDEX (BMI) OF 50.0 TO 59.9 IN ADULT: Status: ACTIVE | Noted: 2024-01-17

## 2024-01-17 PROCEDURE — 99214 OFFICE O/P EST MOD 30 MIN: CPT

## 2024-01-17 PROCEDURE — 73502 X-RAY EXAM HIP UNI 2-3 VIEWS: CPT | Mod: TC,FY,LT

## 2024-01-17 PROCEDURE — 72100 X-RAY EXAM L-S SPINE 2/3 VWS: CPT | Mod: 26,,, | Performed by: RADIOLOGY

## 2024-01-17 PROCEDURE — 73502 X-RAY EXAM HIP UNI 2-3 VIEWS: CPT | Mod: 26,LT,, | Performed by: RADIOLOGY

## 2024-01-17 PROCEDURE — 72100 X-RAY EXAM L-S SPINE 2/3 VWS: CPT | Mod: TC,FY

## 2024-01-17 RX ORDER — ACETAMINOPHEN 500 MG
TABLET ORAL
COMMUNITY
Start: 2024-01-11 | End: 2024-04-17

## 2024-01-17 RX ORDER — METHYLPREDNISOLONE 4 MG/1
TABLET ORAL
Qty: 21 EACH | Refills: 0 | Status: SHIPPED | OUTPATIENT
Start: 2024-01-17 | End: 2024-02-07

## 2024-01-17 RX ORDER — CYCLOBENZAPRINE HCL 5 MG
5 TABLET ORAL 3 TIMES DAILY PRN
Qty: 90 TABLET | Refills: 0 | Status: SHIPPED | OUTPATIENT
Start: 2024-01-17 | End: 2024-02-16

## 2024-01-17 NOTE — PROGRESS NOTES
[facility]    Subjective:     Sydnee Don is a 48 y.o. year old female with PMHx of obesity, HLD who presents to clinic for lower back pain.    Acute left-sided low back pain with left sided hip pain  Patient presents with lumbar pain with radiation down to left side of leg that onset approximately 1 week ago. She denies any alarm symptoms of saddle paresthesia, urinary or fecal incontinence. She denies any trauma, injuries, or inciting events. Patient recently seen in the emergency room for these symptoms and was sent home to try OTC ibuprofen and robaxin. She denies any relief of her symptoms since discharge. She has not attempted physical therapy but is willing to try. No previous xray of lumbar spine seen in chart.     Hypercholesterolemia  Last lipid panel reviewed, patient's cholesterol mildly elevated at 201 and LDL elevated to 120. Patient not currently on statin.     Elevated BP without diagnosis of hypertension  Patient presents with an elevated BP reading of 154/102 at this visit. Previous vitals show elevated Bps in the 130s/80s. Patient currently in acute back pain that radiates to her left leg, could be elevated in the setting of acute pain. Discussed with patient about elevated blood pressure and will reassess at next visit with likelyhood of adding medication for blood pressure at next visit. Discussed return precautions with patient.       Patient Active Problem List    Diagnosis Date Noted    Rosacea 2021    Anxiety and depression 2021        Review of patient's allergies indicates:  No Known Allergies     Past Medical History:   Diagnosis Date    Anxiety and depression 2021    Cervical radiculopathy     Iron deficiency anemia due to chronic blood loss 2018    Rosacea 2021    Stress incontinence       Past Surgical History:   Procedure Laterality Date     SECTION        Family History   Problem Relation Age of Onset    Hyperlipidemia Father      "Hypertension Father     Hyperlipidemia Mother     Hypertension Mother     Arthritis Mother     Breast cancer Paternal Aunt       Social History     Tobacco Use    Smoking status: Never     Passive exposure: Never    Smokeless tobacco: Never   Substance Use Topics    Alcohol use: No        Objective:     Vitals:    01/17/24 1329   BP: (!) 154/102   Pulse: 77   Weight: 118 kg (260 lb 2.3 oz)   Height: 4' 8" (1.422 m)     BMI: Body mass index is 58.32 kg/m².      Physical Exam  Vitals reviewed.   Constitutional:       General: She is not in acute distress.     Appearance: Normal appearance. She is not ill-appearing, toxic-appearing or diaphoretic.   HENT:      Head: Normocephalic and atraumatic.      Right Ear: External ear normal.      Left Ear: External ear normal.      Nose: No congestion or rhinorrhea.      Mouth/Throat:      Mouth: Mucous membranes are moist.      Pharynx: Oropharynx is clear.   Eyes:      General: No scleral icterus.     Extraocular Movements: Extraocular movements intact.      Pupils: Pupils are equal, round, and reactive to light.   Neck:      Vascular: No carotid bruit.   Cardiovascular:      Rate and Rhythm: Normal rate and regular rhythm.      Pulses: Normal pulses.      Heart sounds: Normal heart sounds. No murmur heard.     No friction rub. No gallop.   Pulmonary:      Effort: No respiratory distress.      Breath sounds: No stridor. No wheezing or rhonchi.   Abdominal:      General: Abdomen is flat. There is no distension.      Palpations: Abdomen is soft.      Tenderness: There is no abdominal tenderness. There is no guarding.   Musculoskeletal:      Cervical back: Normal range of motion.      Comments: Lower lumbar spine tender to palpation.  Radiation of pain to left leg. No decreased sensation to left leg.   4/5 strength on left leg extension/flexion and exam limited by pain.  5/5 strength on right leg extension/flexion   Skin:     General: Skin is warm and dry.      Coloration: Skin " "is not jaundiced.      Findings: No bruising or rash.   Neurological:      General: No focal deficit present.      Mental Status: She is alert and oriented to person, place, and time. Mental status is at baseline.   Psychiatric:         Mood and Affect: Mood normal.           Assessment/Plan:     Sydnee Don is a 48 y.o. year old female obesity, HLD who presents to clinic for lower back pain.    1. Acute left-sided low back pain with left-sided sciatica  Approximately 1 week of lumbar spine pain which radiates to the left leg. No neurological alarm symptoms of saddle paresthesia, or incontinence and no history of back or leg injury. Patient previously attempted ibuprofen and robaxin for her pain after recent visit to the ED. Discussed with patient about medications for sciatica pain. Advised patient to STOP taking robaxin and will start flexeril instead. Also prescribed patient a medrol dose pack and explained to patient to NOT take ibuprofen with the medrol dose pack. She can try either or ibuprofen or medrol dosepack to attempt pain relief but not both simultaneously. Patient expressed understanding. Will also get x-rays of hip and spine and refer to physical therapy.  - X-Ray Hip 2 or 3 views Left (with Pelvis when performed); Future  - X-Ray Lumbar Spine AP And Lateral; Future  - Ambulatory referral/consult to Physical/Occupational Therapy; Future  - cyclobenzaprine (FLEXERIL) 5 MG tablet; Take 1 tablet (5 mg total) by mouth 3 (three) times daily as needed for Muscle spasms.  Dispense: 90 tablet; Refill: 0  - methylPREDNISolone (MEDROL DOSEPACK) 4 mg tablet; use as directed  Dispense: 21 each; Refill: 0    2. Hip pain, left  See plan for "acute left-sided low back pain with left-sided sciatica"  - X-Ray Hip 2 or 3 views Left (with Pelvis when performed); Future  - X-Ray Lumbar Spine AP And Lateral; Future  - Ambulatory referral/consult to Physical/Occupational Therapy; Future  - cyclobenzaprine (FLEXERIL) 5 " MG tablet; Take 1 tablet (5 mg total) by mouth 3 (three) times daily as needed for Muscle spasms.  Dispense: 90 tablet; Refill: 0  - methylPREDNISolone (MEDROL DOSEPACK) 4 mg tablet; use as directed  Dispense: 21 each; Refill: 0    3. Hypercholesterolemia  Last lipid panel reviewed, patient's cholesterol mildly elevated at 201 and LDL elevated to 120. Patient not currently on statin.     4. Elevated BP without diagnosis of hypertension  Patient presents with an elevated BP reading of 154/102 at this visit. Previous vitals show elevated Bps in the 130s/80s. Patient currently in acute back pain that radiates to her left leg, could be elevated in the setting of acute pain. Discussed with patient about elevated blood pressure and will reassess at next visit with likelyhood of adding medication for blood pressure at next visit. Discussed return precautions with patient which include but not limited to vision changes, headache, dizziness.     5. Class 3 severe obesity with body mass index (BMI) of 50.0 to 59.9 in adult, unspecified obesity type, unspecified whether serious comorbidity present  Encouraged weight loss which include increased daily exercise 30 minutes/day as well as healthy diet. Counseled patient that weight loss can contribute to decrease stress on joints.       Follow-up:4 weeks for reassessment of back pain after medical management, consider MRI if no change/worsening of symptoms.     A total of 30 minutes was spent on patient care during this encounter which included chart review, examining the patient, formulating a treatment plan and documentation.      Medical decision making straight forward and not complex during this visit.     Case discussed with staff: Dr. Phill Aguila MD  [facility], PGY-1  01/17/2024

## 2024-01-19 ENCOUNTER — TELEPHONE (OUTPATIENT)
Dept: REHABILITATION | Facility: HOSPITAL | Age: 49
End: 2024-01-19
Payer: MEDICAID

## 2024-01-19 NOTE — TELEPHONE ENCOUNTER
Spoke to patient who is requesting to reschedule initial evaluation at [facility]. Patient told our central scheduling department will be contacted and that evaluation at [facility] will be cancelled.    Stephany Amezcua, PT, DPT

## 2024-01-25 NOTE — PROGRESS NOTES
I assume primary medical responsibility for this patient. I have reviewed the history, physical, and assessment & treatment plan with the resident and agree that the care is reasonable and necessary. This service has been performed by a resident without the presence of a teaching physician under the primary care exception. If necessary, an addendum of additional findings or evaluation beyond the resident documentation will be noted below.     Elevated blood pressure without diagnosis of hypertension without signs/symptoms of end-organ damage during today's visit.  Encouraged lifestyle modifications.  Recommended patient to maintain a BP log and bring it back to next scheduled clinic visit in approximately 2 weeks.  Recommended patient schedule a follow-up visit in approximately 2 weeks for repeat clinical exam/BP check.  If BP remains elevated at that visit, may consider adding antihypertensive medication.     Miles Pollock Jr., DO    [facility]

## 2024-01-30 ENCOUNTER — PATIENT MESSAGE (OUTPATIENT)
Dept: FAMILY MEDICINE | Facility: HOSPITAL | Age: 49
End: 2024-01-30
Payer: MEDICAID

## 2024-02-08 ENCOUNTER — CLINICAL SUPPORT (OUTPATIENT)
Dept: REHABILITATION | Facility: HOSPITAL | Age: 49
End: 2024-02-08
Payer: MEDICAID

## 2024-02-08 DIAGNOSIS — M54.50 ACUTE LEFT-SIDED LOW BACK PAIN WITHOUT SCIATICA: Primary | ICD-10-CM

## 2024-02-08 DIAGNOSIS — M25.552 HIP PAIN, LEFT: ICD-10-CM

## 2024-02-08 PROCEDURE — 97161 PT EVAL LOW COMPLEX 20 MIN: CPT

## 2024-02-09 NOTE — PLAN OF CARE
"[facility]   Physical Therapy Initial Evaluation      [id number]    Therapy Diagnosis:   Encounter Diagnoses   Name Primary?    Acute left-sided low back pain without sciatica Yes    Hip pain, left         Physician: Ru Aguila MD    Physician Orders: PT Eval and Treat  Medical Diagnosis from Referral: M25.552 (ICD-10-CM) - Hip pain, left   Evaluation Date: 2/8/2024  Authorization Period Expiration: 1/16/2025  Plan of Care Expiration: 5/3/2024  Visit # / Visits authorized: 1/ 1   FOTO: 1 / 3    Precautions: Standard    Time In: 1500  Time Out: 1606  Total Appointment Time (timed & untimed codes): 60 minutes    SUBJECTIVE   Date of onset: December 2023    History of current condition - Sydnee reports: gradual onset of pain following reorganizing her home around New Years. States that she was going up and down a step ladder and managing boxes most of the day when her symptoms began. Symptoms have gradually worsened since this time. Describes pain in the left lower lumbar spine that radiates laterally into the left lateral hip and groin. Symptoms are constant, have minimal positional relief, and are not dependent on the time of day. She denies any numbness or tingling, but does endorse occasional "coldness" in her left great toe. There are no bowel and bladder symptoms. She denies any prior trauma or history of surgeries.    Falls: None    Imaging, x-rays (1/17/2024):  Lumbar Spine: "Vertebral bodies are in disc spaces are maintained. No bony abnormalities are noted."  Left Hip: "Bones of the hips and pelvis are intact few mild degenerative changes are noted. No bony destruction is seen."    Prior Therapy: None  Social History:  lives with their family  Occupation: not working  Prior Level of Function: no pain or difficulties with activities of daily living  Current Level of Function: severe pain and difficulties with activities of daily " "living    Pain:  Current 10, worst 8/10, best 4/10   Location: Left low back, lateral hip, and groin  Description: Aching and Sharp  Aggravating Factors: standing, walking, transfers, stairs  Easing Factors: laying supine or prone    Patients goals: Resolve pain     Medical History:   Past Medical History:   Diagnosis Date    Anxiety and depression 2021    Cervical radiculopathy     Iron deficiency anemia due to chronic blood loss 2018    Rosacea 2021    Stress incontinence      Surgical History:   Sydnee Don  has a past surgical history that includes  section.    Medications:   Sydnee has a current medication list which includes the following prescription(s): acetaminophen, cyclobenzaprine, ibuprofen, norethindrone-ethinyl estradiol, and solifenacin.    Allergies:   Review of patient's allergies indicates:  No Known Allergies     OBJECTIVE     Observations:  Cervical: forward head and rounded shoulders  Thoracic: increased kyphosis  Lumbar: significant increase in lordosis  Standing: anterior pelvic tilt; bilateral knee valgus and foot pronation  Pelvis: unremarkable pelvic asymmetry or leg length discrepancy.      Gait:  Decreased bilateral step length  Antalgic  Compensated left trendelenburg (almost lateral shift / shear)      Active Range of Motion:  Lumbar Spine   Action Degrees Dysfunction   Flexion 35 Stretching and reproduction of lumbar symptoms   Extension 10 No symptoms; hinging at L5-S1   Left Rotation 20% "Stuck" feeling; no pain   Right Rotation 75% No Symptoms   Left Sidebend 20 "Pinching" symptoms on left   Right Sidebend 30 No symptoms        Passive Range of Motion:  Lower Extremity   Action Left Right   Hip Flexion* 95 degrees 100 degrees   Hip Extension 10 degrees 10 degrees   Hip Abduction 40 degrees 40 degrees   Hip Internal Rotation 30 degrees 35 degrees   Hip External Rotation 35 degrees 45 degrees   Knee Flexion 135 degrees 135 degrees   Knee Extension +4 " degrees +4 degrees   Ankle Dorsiflexion 5 degrees 8 degrees   *Denotes soft tissue approximation*    Manual Muscle Testing:  Lower Extremity   Action Left Right   Hip Flexion* 3 / 5 4 / 5   Hip Extension 3- / 5 4 / 5   Hip Abduction 3- / 5 4- / 5   Hip External Rotation 3- / 5 3+ / 5   Knee Flexion 4 / 5 4+ / 5   Knee Extension 4+ / 5 4+ / 5   Ankle Dorsiflexion 5 / 5 5 / 5   *Graded via Straight Leg Raise*    Special Tests:  Positive (+) Tests  Active Assisted Straight Leg Raise  MAGO  Negative (-) Tests =  Slump  FADIR  ASIS compression / distraction  Sacral thrust  Clonus  DTR's  L4 = absent bilateral  S1 = absent bilateral      Functional Movement & Mechanics:  General fear avoidance movement patterns  Reproduction of symptoms with passive left hip extension      Additional Assessment:  L4-S1 hypermobility  T12-L3 hypomobility  Increased muscle tone at left lumbar paraspinals      Intake Outcome Measure for FOTO Hip Survey    Therapist reviewed FOTO scores for Sydnee Don on 2/8/2024.   FOTO documents entered into EPIC - see Media section.    Intake Score: 16% to 49% (15 visits)       TREATMENT     Total Treatment time (time-based codes) separate from Evaluation: 23 minutes      Sydnee received the treatments listed below:      Therapeutic Exercises to develop strength, endurance, posture, and core stabilization for 00 minutes including:  Posterior pelvic tilt  Clamshells  Hip extension over table      Manual Therapy Techniques: Joint mobilizations and muscle energy techniques were applied to the: Lumbosacral spine for 8 minutes, including:  Lumbosacral Distraction  Left Multifidi isometrics - x3      Therapeutic Activities to improve functional performance for 15 minutes, including:  Education (see below)  Questions and answers    PATIENT EDUCATION AND HOME EXERCISES     Education provided:   Anatomy and Pathology.  Symptom management and plan of care progressions.  Home Exercise Program.    Written Home  Exercises Provided: yes. Exercises were reviewed and Sydnee was able to demonstrate them prior to the end of the session.  Sydnee demonstrated good  understanding of the education provided. See EMR under Patient Instructions for exercises provided during therapy sessions.    ASSESSMENT     Sydnee is a 48 y.o. female referred to outpatient Physical Therapy with a medical diagnosis of left hip pain. Patient presents with decreased range of motion, decreased strength, postural deviations, gait dysfunction, and fear avoidance movement patterns. Signs and symptoms are consistent with left back and hip pain secondary to lumbar extension rotation syndrome. She will benefit from skilled physical therapy addressing her core stabilization, hip strengthening, and proprioceptive retraining.    Patient prognosis is Fair.   Patientt will benefit from skilled outpatient Physical Therapy to address the deficits stated above and in the chart below, provide patient /family education, and to maximize patientt's level of independence.     Plan of care discussed with patient: Yes  Patient's spiritual, cultural and educational needs considered and patient is agreeable to the plan of care and goals as stated below:     Anticipated Barriers for therapy: None    Medical Necessity is demonstrated by the following  History  Co-morbidities and personal factors that may impact the plan of care [] LOW: no personal factors / co-morbidities  [x] MODERATE: 1-2 personal factors / co-morbidities  [] HIGH: 3+ personal factors / co-morbidities    Moderate / High Support Documentation:   Co-morbidities affecting plan of care: anxiety / depression; high BMI    Personal Factors:   no deficits     Examination  Body Structures and Functions, activity limitations and participation restrictions that may impact the plan of care [] LOW: addressing 1-2 elements  [] MODERATE: 3+ elements  [x] HIGH: 4+ elements (please support below)    Moderate / High Support  Documentation: standing, walking, transfers, stairs, house work     Clinical Presentation [x] LOW: stable  [] MODERATE: Evolving  [] HIGH: Unstable     Decision Making/ Complexity Score: low       Goals:  Short Term Goals: 3-4 weeks   Patient will have reduced pain complaints from 8/10 to less than or equal to 4/10. (Not Met - Progressing)  Patient will demonstrate increased AROM/PROM by approximately 25% to 50% of initial measurements. (Not Met - Progressing)  Patient will demonstrate increased muscle strength of at least one-half grade as compared to the initial measurements. (Not Met - Progressing)    Long Term Goals: 6-8 weeks   Patient will have reduced pain complaints from 4/10 to less than or equal to 2/10. (Not Met - Progressing)  Patient will demonstrate increased AROM/PROM by approximately 75% to 100% of initial measurements. (Not Met - Progressing)  Patient will demonstrate increased muscle strength of at least one grade as compared to the initial measurements. (Not Met - Progressing)  Patient will be able to perform stair ascent and descent for approximately 2 flights of stairs with pain less than or equal to 2/10.(Not Met - Progressing)   Patient will improve Hip FOTO Intake score from 16% to greater than or equal to 49%. (Not Met - Progressing)  Patient will be independent with their home exercise program. (Not Met - Progressing)    PLAN   Plan of care Certification: 2/8/2024 to 5/3/2024.    Outpatient Physical Therapy 1 to 2 times weekly for 12 weeks to include the following interventions: Electrical Stimulation with dry needling, Gait Training, Manual Therapy, Neuromuscular Re-ed, Patient Education, Therapeutic Activities, and Therapeutic Exercise.     Keenan Day, PT, DPT, OCS    Co-treated with Papo Khan PT  Board Certified Clinical Specialist in Orthopedic Physical Therapy  Board Certified Clinical Specialist in Sports Physical Therapy  Fellow, [employer] Manual  Physical Therapists        Physician's Signature: _________________________________________ Date: ________________

## 2024-02-12 ENCOUNTER — CLINICAL SUPPORT (OUTPATIENT)
Dept: REHABILITATION | Facility: HOSPITAL | Age: 49
End: 2024-02-12
Payer: MEDICAID

## 2024-02-12 DIAGNOSIS — M25.552 HIP PAIN, LEFT: ICD-10-CM

## 2024-02-12 DIAGNOSIS — M54.50 ACUTE LEFT-SIDED LOW BACK PAIN WITHOUT SCIATICA: Primary | ICD-10-CM

## 2024-02-12 PROCEDURE — 97110 THERAPEUTIC EXERCISES: CPT

## 2024-02-12 NOTE — PROGRESS NOTES
[facility]   Physical Therapy Treatment Note     Name: Sydnee [facility] Number: [id number]    Therapy Diagnosis:   Encounter Diagnoses   Name Primary?    Acute left-sided low back pain without sciatica Yes    Hip pain, left      Physician: Ru Aguila MD    Visit Date: 2/12/2024    Physician Orders: PT Eval and Treat  Medical Diagnosis from Referral: M25.552 (ICD-10-CM) - Hip pain, left   Evaluation Date: 2/8/2024  Authorization Period Expiration: 12/31/2024  Plan of Care Expiration: 5/3/2024  Visit # / Visits authorized: 1/20  FOTO: 1 / 3     Precautions: Standard     Time In: 0901  Time Out: 0955  Total Billable Time: 54 minutes    SUBJECTIVE     Pt reports: less back and hip pain over the past few days. She does still experience pain when standing, bending, and lifting.  She was compliant with home exercise program.  Response to previous treatment: less back pain  Functional change: improved bending    Pain: 3/10  Location: Left low back, lateral hip, and groin     OBJECTIVE     Objective Measures updated at progress report unless specified.     Treatment     Sydnee received the treatments listed below:      Therapeutic Exercises to develop strength, endurance, posture, and core stabilization for 36 minutes including:  Posterior pelvic tilt, 2x10  Clamshells red band on R, 2x10  Hip extension over table, 2x10  Standing hip abduction , 2x10  Seated Thoracic extensions, 2x10  Hip hinge at table, 2x10  Bent knee fall outs, 2x10        Manual Therapy Techniques: Joint mobilizations and muscle energy techniques were applied to the: Lumbosacral spine for 18 minutes, including:  Lumbosacral Distraction  L long axis hip distraction, grade IV  L hip long axis manipulation  L hip true distraction mobilizations, grade III  L lumbar gapping mobilizations, grade III-IV  L L4-5 closing METs   FMP for lumbar flexion    Patient Education and Home Exercises     Home Exercises Provided and  Patient Education Provided     Education provided:   - continue with Home exercise program-> modify clamshells to standing hip abduction     Written Home Exercises Provided: Patient instructed to cont prior HEP. Exercises were reviewed and Sydnee was able to demonstrate them prior to the end of the session.  Sydnee demonstrated good  understanding of the education provided. See EMR under Patient Instructions for exercises provided during therapy sessions    ASSESSMENT     Sydnee demonstrates improved cardinal plane range of motion for her lumbar spine today, though she remains with pain when returning to standing from flexion and with L side bend. She also remains with limited hip mobility on L which reproduces her lateral hip and groin pain. Manual techniques were utilized to improve her lumbar and hip mobility which improved her pain free lumbar and hip range of motion. She required cueing for proper exercise technique for all of her exercises to activation appropriate musculature and to perform the exercise correctly. She requires significant cueing for proper hip hinging as she tends to be more lumbar dominant.    Sydnee Is progressing well towards her goals.   Pt prognosis is Fair.     Pt will continue to benefit from skilled outpatient physical therapy to address the deficits listed in the problem list box on initial evaluation, provide pt/family education and to maximize pt's level of independence in the home and community environment.     Pt's spiritual, cultural and educational needs considered and pt agreeable to plan of care and goals.     Anticipated barriers to physical therapy: none    Goals:   Short Term Goals: 3-4 weeks   Patient will have reduced pain complaints from 8/10 to less than or equal to 4/10. (Not Met - Progressing)  Patient will demonstrate increased AROM/PROM by approximately 25% to 50% of initial measurements. (Not Met - Progressing)  Patient will demonstrate increased muscle  strength of at least one-half grade as compared to the initial measurements. (Not Met - Progressing)     Long Term Goals: 6-8 weeks   Patient will have reduced pain complaints from 4/10 to less than or equal to 2/10. (Not Met - Progressing)  Patient will demonstrate increased AROM/PROM by approximately 75% to 100% of initial measurements. (Not Met - Progressing)  Patient will demonstrate increased muscle strength of at least one grade as compared to the initial measurements. (Not Met - Progressing)  Patient will be able to perform stair ascent and descent for approximately 2 flights of stairs with pain less than or equal to 2/10.(Not Met - Progressing)   Patient will improve Hip FOTO Intake score from 16% to greater than or equal to 49%. (Not Met - Progressing)  Patient will be independent with their home exercise program. (Not Met - Progressing)    PLAN     Plan of care Certification: 2/8/2024 to 5/3/2024.    Low back pain with movement coordination deficits treatment based classification.    Papo Khan, PT   Board Certified Clinical Specialist in Orthopedic Physical Therapy  Board Certified Clinical Specialist in Sports Physical Therapy  Fellow, [employer] Manual Physical Therapists

## 2024-02-19 ENCOUNTER — CLINICAL SUPPORT (OUTPATIENT)
Dept: REHABILITATION | Facility: HOSPITAL | Age: 49
End: 2024-02-19
Payer: MEDICAID

## 2024-02-19 DIAGNOSIS — M54.50 ACUTE LEFT-SIDED LOW BACK PAIN WITHOUT SCIATICA: Primary | ICD-10-CM

## 2024-02-19 DIAGNOSIS — M25.552 HIP PAIN, LEFT: ICD-10-CM

## 2024-02-19 PROCEDURE — 97110 THERAPEUTIC EXERCISES: CPT

## 2024-02-19 NOTE — PROGRESS NOTES
[facility]   Physical Therapy Treatment Note     Name: Sydnee [facility] Number: [id number]    Therapy Diagnosis:   Encounter Diagnoses   Name Primary?    Acute left-sided low back pain without sciatica Yes    Hip pain, left      Physician: Ru Aguila MD    Visit Date: 2/19/2024    Physician Orders: PT Eval and Treat  Medical Diagnosis from Referral: M25.552 (ICD-10-CM) - Hip pain, left   Evaluation Date: 2/8/2024  Authorization Period Expiration: 5/1/2024  Plan of Care Expiration: 5/3/2024  Visit # / Visits authorized: 2/20  FOTO: 1 / 3     Precautions: Standard     Time In: 1058  Time Out: 1153  Total Billable Time: 54 minutes    SUBJECTIVE     Pt reports: she remains with groin and back pain, but this continues to improve each week.   She was compliant with home exercise program.  Response to previous treatment: less back pain  Functional change: improved bending    Pain: 2/10  Location: Left low back, lateral hip, and groin     OBJECTIVE     Objective Measures updated at progress report unless specified.     Treatment     Sydnee received the treatments listed below:      Therapeutic Exercises to develop strength, endurance, posture, and core stabilization for 38 minutes including:  Posterior pelvic tilt, 2x10  Clamshells R, 2x10  Hip extension over table, 2x10  Standing hip abduction , 2x10  Seated Thoracic extensions, 2x10  Seated hip External rotation, 2x10  Hip hinge at table, 2x10  Bent knee fall outs, 2x10  T-ball roll outs for lumber flexion, 15x5 seconds        Manual Therapy Techniques: Joint mobilizations and muscle energy techniques were applied to the: Lumbosacral spine for 16 minutes, including:  Lumbosacral Distraction  L long axis hip distraction, grade IV  L hip long axis manipulation  L hip true distraction mobilizations, grade III  L lumbar gapping mobilizations, grade III-IV  L L4-5 closing METs     Not today:  FMP for lumbar flexion    Patient Education and  Home Exercises     Home Exercises Provided and Patient Education Provided     Education provided:   - continue with Home exercise program-> modify clamshells to standing hip abduction     Written Home Exercises Provided: Patient instructed to cont prior HEP. Exercises were reviewed and Sydnee was able to demonstrate them prior to the end of the session.  Sydnee demonstrated good  understanding of the education provided. See EMR under Patient Instructions for exercises provided during therapy sessions    ASSESSMENT     Sydnee remains with limited lumbar and hip mobility so further manual techniques were performed today to improve her mobility. She was initiated on lumbar flexion self mobility to allow her to work her mobility independently. She continues to require cueing for proper lumbar neutral and for pelvic positioning during her exercise program.     Sydnee Is progressing well towards her goals.   Pt prognosis is Fair.     Pt will continue to benefit from skilled outpatient physical therapy to address the deficits listed in the problem list box on initial evaluation, provide pt/family education and to maximize pt's level of independence in the home and community environment.     Pt's spiritual, cultural and educational needs considered and pt agreeable to plan of care and goals.     Anticipated barriers to physical therapy: none    Goals:   Short Term Goals: 3-4 weeks   Patient will have reduced pain complaints from 8/10 to less than or equal to 4/10. (Not Met - Progressing)  Patient will demonstrate increased AROM/PROM by approximately 25% to 50% of initial measurements. (Not Met - Progressing)  Patient will demonstrate increased muscle strength of at least one-half grade as compared to the initial measurements. (Not Met - Progressing)     Long Term Goals: 6-8 weeks   Patient will have reduced pain complaints from 4/10 to less than or equal to 2/10. (Not Met - Progressing)  Patient will demonstrate  increased AROM/PROM by approximately 75% to 100% of initial measurements. (Not Met - Progressing)  Patient will demonstrate increased muscle strength of at least one grade as compared to the initial measurements. (Not Met - Progressing)  Patient will be able to perform stair ascent and descent for approximately 2 flights of stairs with pain less than or equal to 2/10.(Not Met - Progressing)   Patient will improve Hip FOTO Intake score from 16% to greater than or equal to 49%. (Not Met - Progressing)  Patient will be independent with their home exercise program. (Not Met - Progressing)    PLAN     Plan of care Certification: 2/8/2024 to 5/3/2024.    Low back pain with movement coordination deficits treatment based classification.    Papo Khan, PT   Board Certified Clinical Specialist in Orthopedic Physical Therapy  Board Certified Clinical Specialist in Sports Physical Therapy  Fellow, [employer] Physical Therapists

## 2024-02-22 ENCOUNTER — CLINICAL SUPPORT (OUTPATIENT)
Dept: REHABILITATION | Facility: HOSPITAL | Age: 49
End: 2024-02-22
Payer: MEDICAID

## 2024-02-22 DIAGNOSIS — M54.50 ACUTE LEFT-SIDED LOW BACK PAIN WITHOUT SCIATICA: Primary | ICD-10-CM

## 2024-02-22 DIAGNOSIS — M25.552 HIP PAIN, LEFT: ICD-10-CM

## 2024-02-22 PROCEDURE — 97110 THERAPEUTIC EXERCISES: CPT

## 2024-02-22 NOTE — PROGRESS NOTES
[facility]   Physical Therapy Treatment Note     [id number]    Therapy Diagnosis:   Encounter Diagnoses   Name Primary?    Acute left-sided low back pain without sciatica Yes    Hip pain, left        Physician: Ru Aguila MD    Visit Date: 2/22/2024    Physician Orders: PT Eval and Treat  Medical Diagnosis from Referral: M25.552 (ICD-10-CM) - Hip pain, left   Evaluation Date: 2/8/2024  Authorization Period Expiration: 5/1/2024  Plan of Care Expiration: 5/3/2024  Visit # / Visits authorized: 3/20  FOTO: 3 / 3     Precautions: Standard     Time In: 1302  Time Out: 1400  Total Billable Time: 57 minutes    SUBJECTIVE     Pt reports: Overall feels a lot better but today has mostly back pain on the Left. Was really bad last week but since Monday has been getting better. The Left hip will hurt sometimes but not all the time.    She was compliant with home exercise program.  Response to previous treatment: less back pain  Functional change: improved bending    Pain: 2/10  Location: Left low back, lateral hip, and groin     OBJECTIVE     Objective Measures updated at progress report unless specified.     +Ant sheer of femur with Hooklying Hip flexion  + Lumbar Sheer L4-5  Hypomobility noted upper lumbar and thoracic spine   SIJ - provocation tests negative     Treatment     Sydnee received the treatments listed below:      Therapeutic Exercises to develop strength, endurance, posture, and core stabilization for 32 minutes including:  Posterior pelvic tilt, 2x10  5 sec holds first round 10 sec holds second round   Bridge with cue on PPT 2x10   Pretzel 2x10 L  Clamshells R, 2x10  Hip hike with ball on wall 3x8  Standing hip abduction  2x10 - cuing to perform PPT relieves back pain  Bent knee fall outs, 2x10 with PPT    Not today:  T-ball roll outs for lumber flexion, 15x5 seconds  Hip extension over table, 2x10  Seated Thoracic extensions, 2x10  Seated hip  External rotation, 2x10  Hip hinge at table, 2x10     Manual Therapy Techniques: Joint mobilizations and muscle energy techniques were applied to the: Lumbosacral spine for 17 minutes, including:  Lumbosacral Distraction  L long axis hip distraction, grade IV  L hip long axis manipulation  L hip true distraction mobilizations, grade III  PA mobilization L1-3 grade 3-4      Not today:  FMP for lumbar flexion  L lumbar gapping mobilizations, grade III-IV  L L4-5 closing METs     Sydnee participated in dynamic functional therapeutic activities to improve functional performance for 8  minutes, including:  Sit to stand with GTB thighs 3x10     Patient Education and Home Exercises     Home Exercises Provided and Patient Education Provided     Education provided:   - continue with Home exercise program-> modify clamshells to standing hip abduction     Written Home Exercises Provided: Patient instructed to cont prior HEP. Exercises were reviewed and Sydnee was able to demonstrate them prior to the end of the session.  Sydnee demonstrated good  understanding of the education provided. See EMR under Patient Instructions for exercises provided during therapy sessions    ASSESSMENT     Sydnee presents with continued Left sided low back pain and Left hip pain primary ant today most likely due to poor hip control. She demonstrate glute med and rotator weakness. Hypomobility in upper lumbar region with sheer/hinge L4/5. Cuing on PPT was provided throughout which reduced low back pain during hip activation exercises. PT to continue to progress motor control in hips and lumbar region.     Sydnee Is progressing well towards her goals.   Pt prognosis is Fair.     Pt will continue to benefit from skilled outpatient physical therapy to address the deficits listed in the problem list box on initial evaluation, provide pt/family education and to maximize pt's level of independence in the home and community environment.     Pt's  spiritual, cultural and educational needs considered and pt agreeable to plan of care and goals.     Anticipated barriers to physical therapy: none    Goals:   Short Term Goals: 3-4 weeks   Patient will have reduced pain complaints from 8/10 to less than or equal to 4/10. (Not Met - Progressing)  Patient will demonstrate increased AROM/PROM by approximately 25% to 50% of initial measurements. (Not Met - Progressing)  Patient will demonstrate increased muscle strength of at least one-half grade as compared to the initial measurements. (Not Met - Progressing)     Long Term Goals: 6-8 weeks   Patient will have reduced pain complaints from 4/10 to less than or equal to 2/10. (Not Met - Progressing)  Patient will demonstrate increased AROM/PROM by approximately 75% to 100% of initial measurements. (Not Met - Progressing)  Patient will demonstrate increased muscle strength of at least one grade as compared to the initial measurements. (Not Met - Progressing)  Patient will be able to perform stair ascent and descent for approximately 2 flights of stairs with pain less than or equal to 2/10.(Not Met - Progressing)   Patient will improve Hip FOTO Intake score from 16% to greater than or equal to 49%. (Not Met - Progressing)  Patient will be independent with their home exercise program. (Not Met - Progressing)    PLAN     Plan of care Certification: 2/8/2024 to 5/3/2024.    Low back pain with movement coordination deficits treatment based classification.    Nettie Smith, PT       Co-treated by Rickie Khan, PT, DPT  Board Certified Clinical Specialist in Orthopedic Physical Therapy  Board Certified Clinical Specialist in Sports Physical Therapy  Fellow, [employer] Manual Physical Therapists

## 2024-02-26 ENCOUNTER — CLINICAL SUPPORT (OUTPATIENT)
Dept: REHABILITATION | Facility: HOSPITAL | Age: 49
End: 2024-02-26
Payer: MEDICAID

## 2024-02-26 DIAGNOSIS — M54.50 ACUTE LEFT-SIDED LOW BACK PAIN WITHOUT SCIATICA: Primary | ICD-10-CM

## 2024-02-26 DIAGNOSIS — M25.552 HIP PAIN, LEFT: ICD-10-CM

## 2024-02-26 PROCEDURE — 97110 THERAPEUTIC EXERCISES: CPT

## 2024-02-26 NOTE — PROGRESS NOTES
[facility]   Physical Therapy Treatment Note     Name: Sydnee [facility] Number: [id number]    Therapy Diagnosis:   Encounter Diagnoses   Name Primary?    Acute left-sided low back pain without sciatica Yes    Hip pain, left        Physician: Ru Aguila MD    Visit Date: 2/26/2024    Physician Orders: PT Eval and Treat  Medical Diagnosis from Referral: M25.552 (ICD-10-CM) - Hip pain, left   Evaluation Date: 2/8/2024  Authorization Period Expiration: 5/1/2024  Plan of Care Expiration: 5/3/2024  Visit # / Visits authorized: 4/20  FOTO: 1 / 3     Precautions: Standard     Time In: 1100  Time Out: 1159  Total Billable Time: 54 minutes    SUBJECTIVE     Pt reports: she was doing better overall, but went to the aquarium on Saturday and this irritated her back and lateral hip pain.   She was compliant with home exercise program.  Response to previous treatment: less back pain  Functional change: improved bending    Pain: 2/10  Location: Left low back, lateral hip, and groin     OBJECTIVE     Objective Measures updated at progress report unless specified.         Treatment     Sydnee received the treatments listed below:      Therapeutic Exercises to develop strength, endurance, posture, and core stabilization for 39 minutes including:  Posterior pelvic tilt, 2x10  5 sec holds first round 10 sec holds second round   Bent knee fall outs, 2x10 with PPT  Marching 2x10 with PPT   Bridge with cue on PPT 2x10   Pretzel 2x10 L  Clamshells R, 2x10  Standing hip abduction  2x10 - cuing to perform PPT relieves back pain  Hip hinge seated on table with pole, 2x10    Not today:  T-ball roll outs for lumber flexion, 15x5 seconds  Hip extension over table, 2x10  Seated Thoracic extensions, 2x10  Seated hip External rotation, 2x10  Hip hike with ball on wall 3x8     Manual Therapy Techniques: Joint mobilizations and muscle energy techniques were applied to the: Lumbosacral spine for 15 minutes,  including:  Lumbosacral Distraction  L long axis hip distraction, grade IV  L hip long axis manipulation  L hip true distraction mobilizations, grade III  PA mobilization L1-3 grade 3-4  Prone thoracic posterior to anterior glides, grade 4      Not today:  FMP for lumbar flexion  L lumbar gapping mobilizations, grade III-IV  L L4-5 closing METs       Patient Education and Home Exercises     Home Exercises Provided and Patient Education Provided     Education provided:   - continue with Home exercise program-> modify clamshells to standing hip abduction     Written Home Exercises Provided: Patient instructed to cont prior HEP. Exercises were reviewed and Sydnee was able to demonstrate them prior to the end of the session.  Sydnee demonstrated good  understanding of the education provided. See EMR under Patient Instructions for exercises provided during therapy sessions    ASSESSMENT     Sydnee arrives with more irritable low back and hip pain today which improves following manual techniques. She continues to require cueing for proper posterior pelvic tilt as she sits in excessive lumbar lordosis. We advanced her lumbar neutral training with additional leg movements to further challenge her core strength. She continues to require the pole and external cueing for proper hip hinging.     Sydnee Is progressing well towards her goals.   Pt prognosis is Fair.     Pt will continue to benefit from skilled outpatient physical therapy to address the deficits listed in the problem list box on initial evaluation, provide pt/family education and to maximize pt's level of independence in the home and community environment.     Pt's spiritual, cultural and educational needs considered and pt agreeable to plan of care and goals.     Anticipated barriers to physical therapy: none    Goals:   Short Term Goals: 3-4 weeks   Patient will have reduced pain complaints from 8/10 to less than or equal to 4/10. (Not Met -  Progressing)  Patient will demonstrate increased AROM/PROM by approximately 25% to 50% of initial measurements. (Not Met - Progressing)  Patient will demonstrate increased muscle strength of at least one-half grade as compared to the initial measurements. (Not Met - Progressing)     Long Term Goals: 6-8 weeks   Patient will have reduced pain complaints from 4/10 to less than or equal to 2/10. (Not Met - Progressing)  Patient will demonstrate increased AROM/PROM by approximately 75% to 100% of initial measurements. (Not Met - Progressing)  Patient will demonstrate increased muscle strength of at least one grade as compared to the initial measurements. (Not Met - Progressing)  Patient will be able to perform stair ascent and descent for approximately 2 flights of stairs with pain less than or equal to 2/10.(Not Met - Progressing)   Patient will improve Hip FOTO Intake score from 16% to greater than or equal to 49%. (Not Met - Progressing)  Patient will be independent with their home exercise program. (Not Met - Progressing)    PLAN     Plan of care Certification: 2/8/2024 to 5/3/2024.    Low back pain with movement coordination deficits treatment based classification.    Papo Khan, PT   Board Certified Clinical Specialist in Orthopedic Physical Therapy  Board Certified Clinical Specialist in Sports Physical Therapy  Fellow, [employer] Orth[employer] Manual Physical Therapists

## 2024-02-28 ENCOUNTER — OFFICE VISIT (OUTPATIENT)
Dept: FAMILY MEDICINE | Facility: HOSPITAL | Age: 49
End: 2024-02-28
Payer: MEDICAID

## 2024-02-28 VITALS
HEART RATE: 76 BPM | WEIGHT: 258.38 LBS | SYSTOLIC BLOOD PRESSURE: 150 MMHG | BODY MASS INDEX: 58.12 KG/M2 | HEIGHT: 56 IN | DIASTOLIC BLOOD PRESSURE: 98 MMHG

## 2024-02-28 DIAGNOSIS — M25.552 PAIN OF LEFT HIP: ICD-10-CM

## 2024-02-28 DIAGNOSIS — I10 HYPERTENSION, UNSPECIFIED TYPE: ICD-10-CM

## 2024-02-28 DIAGNOSIS — M54.42 ACUTE LEFT-SIDED LOW BACK PAIN WITH LEFT-SIDED SCIATICA: Primary | ICD-10-CM

## 2024-02-28 DIAGNOSIS — R22.9 LOCALIZED SWELLING, MASS AND LUMP, UNSPECIFIED: ICD-10-CM

## 2024-02-28 PROCEDURE — 99213 OFFICE O/P EST LOW 20 MIN: CPT

## 2024-02-28 RX ORDER — AMLODIPINE BESYLATE 5 MG/1
5 TABLET ORAL DAILY
Qty: 30 TABLET | Refills: 11 | Status: SHIPPED | OUTPATIENT
Start: 2024-02-28 | End: 2024-03-21

## 2024-02-28 NOTE — PROGRESS NOTES
[facility]    Subjective:     Sydnee Don is a 49 y.o. year old female with PMHx of obesity, HTN, HLD who presents to clinic for back pain.    #Back pain  Patient states that since starting PT she has starting having improvement with her back pain but it still exists. She is now no longer using a cane to walk but still has to move slowly. She still feels a sharp pain that radiates to her groin and hip. She states that her pain was a 10/10 before PT and is now an 8/10. She denies any fevers, chills, night sweats. She states that she has noticed that there was a bump in her lumbar spine area during the last clinic visit and that it has increased in size since then. She denies any urinary or fecal incontinence, denies any numbness or tingling in her groin area. Her pain is currently being managed with tylenol and ibuprofen with mild relief.     #elevated blood pressure  Patient with multiple readings of elevated blood pressure so unlikely acutely elevated due to her back pain more likely chronic. BP today is 150/98. Discussed with patient about starting an antihypertensive and patient agreed with plan.     #obesity  Patient states that she is currently interested in bariatric surgery and would like to get evaluated. Current BMI is 57.93.      Patient Active Problem List    Diagnosis Date Noted    Acute left-sided low back pain without sciatica 02/08/2024    Hip pain, left 02/08/2024    Borderline Hypercholesterolemia 01/17/2024    Elevated BP without diagnosis of hypertension 01/17/2024    Class 3 severe obesity with body mass index (BMI) of 50.0 to 59.9 in adult 01/17/2024    Other spondylosis with radiculopathy, lumbar region 01/17/2024    Rosacea 05/05/2021    Anxiety and depression 05/05/2021        Review of patient's allergies indicates:  No Known Allergies     Past Medical History:   Diagnosis Date    Anxiety and depression 5/5/2021    Cervical radiculopathy     Iron deficiency anemia due to chronic  "blood loss 2018    Rosacea 2021    Stress incontinence       Past Surgical History:   Procedure Laterality Date     SECTION        Family History   Problem Relation Age of Onset    Hyperlipidemia Father     Hypertension Father     Hyperlipidemia Mother     Hypertension Mother     Arthritis Mother     Breast cancer Paternal Aunt       Social History     Tobacco Use    Smoking status: Never     Passive exposure: Never    Smokeless tobacco: Never   Substance Use Topics    Alcohol use: No        Objective:     Vitals:    24 1352   BP: (!) 150/98   Pulse: 76   Weight: 117.2 kg (258 lb 6.1 oz)   Height: 4' 8" (1.422 m)     BMI: Body mass index is 57.93 kg/m².    Physical Exam  Vitals reviewed.   Constitutional:       General: She is not in acute distress.     Appearance: Normal appearance. She is not ill-appearing, toxic-appearing or diaphoretic.   HENT:      Head: Normocephalic and atraumatic.      Right Ear: External ear normal.      Left Ear: External ear normal.      Nose: No congestion or rhinorrhea.      Mouth/Throat:      Mouth: Mucous membranes are moist.      Pharynx: Oropharynx is clear.   Eyes:      General: No scleral icterus.     Extraocular Movements: Extraocular movements intact.      Pupils: Pupils are equal, round, and reactive to light.   Neck:      Vascular: No carotid bruit.   Cardiovascular:      Rate and Rhythm: Normal rate and regular rhythm.      Pulses: Normal pulses.      Heart sounds: Normal heart sounds. No murmur heard.     No friction rub. No gallop.   Pulmonary:      Effort: No respiratory distress.      Breath sounds: No stridor. No wheezing or rhonchi.   Abdominal:      General: Abdomen is flat. There is no distension.      Palpations: Abdomen is soft.      Tenderness: There is no abdominal tenderness. There is no guarding.   Musculoskeletal:      Cervical back: Normal range of motion.      Comments: Lower lumbar spine tender to palpation.  Today there is a 2 cm " "paraspinal mass in lumbar region mildly tender to palpation.   Radiation of pain to left leg. No decreased sensation to left leg.   4/5 strength on left leg extension/flexion and exam limited by pain.  5/5 strength on right leg extension/flexion   Skin:     General: Skin is warm and dry.      Coloration: Skin is not jaundiced.      Findings: No bruising or rash.   Neurological:      General: No focal deficit present.      Mental Status: She is alert and oriented to person, place, and time. Mental status is at baseline.   Psychiatric:         Mood and Affect: Mood normal.           Assessment/Plan:     Sydnee Don is a 49 y.o. year old female obesity, HTN, HLD who presents to clinic for back pain.    1. Acute left-sided low back pain with left-sided sciatica  Patient with over 2 months of lower back pain that radiates down left leg, no saddle paresthesias or urinary/fecal incontinence. Patient has very mild improvement with PT and pain currently only managed with tylenol and ibuprofen. Patient has a new paraspinal protrusion at this clinic visit, will MRI for concern for disc herniation, and to rule out fractures, cauda equina, and other spinal pathologies.   - MRI Lumbar Spine Without Contrast; Future    2. BMI 50.0-59.9, adult  Discussed healthy eating and diet with patient and high BMI contribution to back and joint pains. Patient with a BMI > 50 interested in bariatric surgery, discussed with patient and gave information to get evaluated by [facility] bariatric surgery.     3. Hypertension, unspecified type  Patient with over 2 readings of elevated blood pressure, unlikely acutely elevated due to pain at this time. Will start amlodipine and reevaluate blood pressure at next visit.  - amLODIPine (NORVASC) 5 MG tablet; Take 1 tablet (5 mg total) by mouth once daily.  Dispense: 30 tablet; Refill: 11    4. Localized swelling, mass and lump, unspecified  See "acute left-sided low back pain with left-sided sciatica" - " new onset paraspinal mass in the context of radiating pain to legs, will need MRI to evaluate new mass.  - MRI Lumbar Spine Without Contrast; Future    5. Pain of left hip  See above.       Follow-up:2 weeks for imaging and BP follow up    A total of 30 minutes was spent on patient care during this encounter which included chart review, examining the patient, formulating a treatment plan and documentation.      Medical decision making straight forward and not complex during this visit.     Case discussed with staff: Dr. Phill Aguila MD  [facility], PGY-1  03/12/2024

## 2024-03-04 ENCOUNTER — CLINICAL SUPPORT (OUTPATIENT)
Dept: REHABILITATION | Facility: HOSPITAL | Age: 49
End: 2024-03-04
Payer: MEDICAID

## 2024-03-04 DIAGNOSIS — M54.50 ACUTE LEFT-SIDED LOW BACK PAIN WITHOUT SCIATICA: Primary | ICD-10-CM

## 2024-03-04 DIAGNOSIS — M25.552 HIP PAIN, LEFT: ICD-10-CM

## 2024-03-04 PROCEDURE — 97110 THERAPEUTIC EXERCISES: CPT

## 2024-03-04 NOTE — PROGRESS NOTES
"[facility]   Physical Therapy Treatment and Progress Note     [id number]    Therapy Diagnosis:   Encounter Diagnoses   Name Primary?    Acute left-sided low back pain without sciatica Yes    Hip pain, left        Physician: Ru Aguila MD    Visit Date: 3/4/2024    Physician Orders: PT Eval and Treat  Medical Diagnosis from Referral: M25.552 (ICD-10-CM) - Hip pain, left   Evaluation Date: 2/8/2024  Authorization Period Expiration: 5/1/2024  Plan of Care Expiration: 5/3/2024  Progress note due: 3/8/2024  Visit # / Visits authorized: 5/20  FOTO: 1 / 3     Precautions: Standard     Time In: 1100  Time Out: 1159  Total Billable Time: 54 minutes    SUBJECTIVE     Pt reports: she felt better after her last PT session, but she did some chores around her house and this irritated her back.   She was compliant with home exercise program.  Response to previous treatment: less back pain  Functional change: improved bending    Pain: 4/10  Location: Left low back, lateral hip, and groin     OBJECTIVE     Objective Measures updated at progress report unless specified.     Active Range of Motion:       Lumbar Spine   Action Degrees Dysfunction   Flexion 75% Incomplete curve reversal   Extension 75% No symptoms; hinging at L5-S1   Left Sidebend 75% "Pinching" symptoms on left   Right Sidebend 75% No symptoms        Treatment     Sydnee received the treatments listed below:      Therapeutic Exercises to develop strength, endurance, posture, and core stabilization for 39 minutes including:  Posterior pelvic tilt, 2x15  5 sec holds first round 10 sec holds second round   Bent knee fall outs red band, 2x15 with PPT  Marching 2x10 with PPT   Bridge with cue on PPT 2x10   Bent over table hip abduction  2x10   Hip hinge standing on table with pole, 2x10  T-ball roll outs for lumber flexion, 15x5 seconds    Not today:  Hip extension over table, 2x10  Seated Thoracic " extensions, 2x10  Seated hip External rotation, 2x10  Hip hike with ball on wall 3x8  Pretzel 2x10 L  Clamshells R, 2x10     Manual Therapy Techniques: Joint mobilizations and muscle energy techniques were applied to the: Lumbosacral spine for 15 minutes, including:  Lumbosacral Distraction  L long axis hip distraction, grade IV  L hip long axis manipulation  L hip true distraction mobilizations, grade III  PA mobilization L1-3 grade 3-4  Prone thoracic posterior to anterior glides, grade 4      Not today:  FMP for lumbar flexion  L lumbar gapping mobilizations, grade III-IV  L L4-5 closing METs       Patient Education and Home Exercises     Home Exercises Provided and Patient Education Provided     Education provided:   - ergonomics for household chores    Written Home Exercises Provided: Patient instructed to cont prior HEP. Exercises were reviewed and Sydnee was able to demonstrate them prior to the end of the session.  Sydnee demonstrated good  understanding of the education provided. See EMR under Patient Instructions for exercises provided during therapy sessions    ASSESSMENT     Sydnee has been seen for 5 visits for her acute low back pain over the past month. She demonstrates improved lumbar range of motion and less pain at worst which has led to an improvement in her ability to complete her Activities of Daily Living and Instrumental Activities of Daily Living. She remains with limited lumbar range of motion, decreased strength, impaired posture and pain which limits her ability to complete her Activities of Daily Living and Instrumental Activities of Daily Living. She remains a good candidate for physical therapy services to address her impairments and to facilitate her return to her prior level of function.     Sydnee Is progressing well towards her goals.   Pt prognosis is Fair.     Pt will continue to benefit from skilled outpatient physical therapy to address the deficits listed in the problem  list box on initial evaluation, provide pt/family education and to maximize pt's level of independence in the home and community environment.     Pt's spiritual, cultural and educational needs considered and pt agreeable to plan of care and goals.     Anticipated barriers to physical therapy: none    Goals:   Short Term Goals: 3-4 weeks   Patient will have reduced pain complaints from 8/10 to less than or equal to 4/10. (Met)  Patient will demonstrate increased AROM/PROM by approximately 25% to 50% of initial measurements. (Partially Met)  Patient will demonstrate increased muscle strength of at least one-half grade as compared to the initial measurements. (Not Met - Progressing)     Long Term Goals: 6-8 weeks   Patient will have reduced pain complaints from 4/10 to less than or equal to 2/10. (Not Met - Progressing)  Patient will demonstrate increased AROM/PROM by approximately 75% to 100% of initial measurements. (Not Met - Progressing)  Patient will demonstrate increased muscle strength of at least one grade as compared to the initial measurements. (Not Met - Progressing)  Patient will be able to perform stair ascent and descent for approximately 2 flights of stairs with pain less than or equal to 2/10.(Not Met - Progressing)   Patient will improve Hip FOTO Intake score from 16% to greater than or equal to 49%. (Not Met - Progressing)  Patient will be independent with their home exercise program. (Not Met - Progressing)    PLAN     Plan of care Certification: 2/8/2024 to 5/3/2024.    Low back pain with movement coordination deficits treatment based classification.    Papo Khan, PT   Board Certified Clinical Specialist in Orthopedic Physical Therapy  Board Certified Clinical Specialist in Sports Physical Therapy  Fellow, [employer] Orth[employer] Manual Physical Therapists

## 2024-03-07 ENCOUNTER — HOSPITAL ENCOUNTER (OUTPATIENT)
Dept: RADIOLOGY | Facility: HOSPITAL | Age: 49
Discharge: HOME | End: 2024-03-07
Payer: MEDICAID

## 2024-03-07 DIAGNOSIS — M54.42 ACUTE LEFT-SIDED LOW BACK PAIN WITH LEFT-SIDED SCIATICA: ICD-10-CM

## 2024-03-07 DIAGNOSIS — R22.9 LOCALIZED SWELLING, MASS AND LUMP, UNSPECIFIED: ICD-10-CM

## 2024-03-07 PROCEDURE — 72148 MRI LUMBAR SPINE W/O DYE: CPT | Mod: TC

## 2024-03-07 PROCEDURE — 72148 MRI LUMBAR SPINE W/O DYE: CPT | Mod: 26,,, | Performed by: RADIOLOGY

## 2024-03-11 ENCOUNTER — CLINICAL SUPPORT (OUTPATIENT)
Dept: REHABILITATION | Facility: HOSPITAL | Age: 49
End: 2024-03-11
Payer: MEDICAID

## 2024-03-11 DIAGNOSIS — M54.50 ACUTE LEFT-SIDED LOW BACK PAIN WITHOUT SCIATICA: Primary | ICD-10-CM

## 2024-03-11 DIAGNOSIS — M25.552 HIP PAIN, LEFT: ICD-10-CM

## 2024-03-11 PROCEDURE — 97110 THERAPEUTIC EXERCISES: CPT

## 2024-03-11 NOTE — PROGRESS NOTES
[facility]   Physical Therapy Treatment Note     Name: Sydnee [facility] Number: [id number]    Therapy Diagnosis:   Encounter Diagnoses   Name Primary?    Acute left-sided low back pain without sciatica Yes    Hip pain, left        Physician: Ru Aguila MD    Visit Date: 3/11/2024    Physician Orders: PT Eval and Treat  Medical Diagnosis from Referral: M25.552 (ICD-10-CM) - Hip pain, left   Evaluation Date: 2/8/2024  Authorization Period Expiration: 5/1/2024  Plan of Care Expiration: 5/3/2024  Progress note due: 4/4/2024  Visit # / Visits authorized: 6/20  FOTO: 1 / 3     Precautions: Standard     Time In: 1100  Time Out: 1159  Total Billable Time: 54 minutes    SUBJECTIVE     Pt reports: her back and  hip are feeling much better. She notices she can walk with less limping.  She was compliant with home exercise program.  Response to previous treatment: less back pain  Functional change: improved bending    Pain: 4/10  Location: Left low back, lateral hip, and groin     OBJECTIVE     Objective Measures updated at progress report unless specified.         Treatment     Sydnee received the treatments listed below:      Therapeutic Exercises to develop strength, endurance, posture, and core stabilization for 37 minutes including:  Bike x10 mins  Posterior pelvic tilt, 2x15  5 sec holds first round 10 sec holds second round   Bent knee fall outs red band, 2x15 with PPT  Marching 2x10 with PPT   Bridge with cue on PPT 2x10   Bent over table hip abduction  2x10   Hip extension over table, 2x10  Hip hinge standing with pole, 2x10  T-ball roll outs for lumber flexion, 2x15 5 seconds  Seated Thoracic extensions, 2x10    Not today:  Seated hip External rotation, 2x10  Hip hike with ball on wall 3x8  Pretzel 2x10 L  Clamshells R, 2x10     Manual Therapy Techniques: Joint mobilizations and muscle energy techniques were applied to the: Lumbosacral spine for 17 minutes, including:  Lumbosacral  Distraction  L long axis hip distraction, grade IV  L hip long axis manipulation  L hip true distraction mobilizations, grade III  L hip posterior glide with internal rotation mobilization with movement  PA mobilization L1-3 grade 3-4  Prone thoracic posterior to anterior glides, grade 4      Not today:  FMP for lumbar flexion  L lumbar gapping mobilizations, grade III-IV  L L4-5 closing METs       Patient Education and Home Exercises     Home Exercises Provided and Patient Education Provided     Education provided:   - ergonomics for household chores    Written Home Exercises Provided: Patient instructed to cont prior HEP. Exercises were reviewed and Sydnee was able to demonstrate them prior to the end of the session.  Sydnee demonstrated good  understanding of the education provided. See EMR under Patient Instructions for exercises provided during therapy sessions    ASSESSMENT     Sydnee was doing much better today with less hip pain and improved gait pattern. She continues to experience anterior hip pain with hip flexion which improves following manual techniques directed at her lumbar spine and hip. She was progressed to further hip and core training today with moderate cueing for proper lumbar neutral.     Sydnee Is progressing well towards her goals.   Pt prognosis is Fair.     Pt will continue to benefit from skilled outpatient physical therapy to address the deficits listed in the problem list box on initial evaluation, provide pt/family education and to maximize pt's level of independence in the home and community environment.     Pt's spiritual, cultural and educational needs considered and pt agreeable to plan of care and goals.     Anticipated barriers to physical therapy: none    Goals:   Short Term Goals: 3-4 weeks   Patient will have reduced pain complaints from 8/10 to less than or equal to 4/10. (Met)  Patient will demonstrate increased AROM/PROM by approximately 25% to 50% of initial  measurements. (Met)  Patient will demonstrate increased muscle strength of at least one-half grade as compared to the initial measurements. (Not Met - Progressing)     Long Term Goals: 6-8 weeks   Patient will have reduced pain complaints from 4/10 to less than or equal to 2/10. (Not Met - Progressing)  Patient will demonstrate increased AROM/PROM by approximately 75% to 100% of initial measurements. (Not Met - Progressing)  Patient will demonstrate increased muscle strength of at least one grade as compared to the initial measurements. (Not Met - Progressing)  Patient will be able to perform stair ascent and descent for approximately 2 flights of stairs with pain less than or equal to 2/10.(Not Met - Progressing)   Patient will improve Hip FOTO Intake score from 16% to greater than or equal to 49%. (Not Met - Progressing)  Patient will be independent with their home exercise program. (Not Met - Progressing)    PLAN     Plan of care Certification: 2/8/2024 to 5/3/2024.    Low back pain with movement coordination deficits treatment based classification.    Papo Khan, PT   Board Certified Clinical Specialist in Orthopedic Physical Therapy  Board Certified Clinical Specialist in Sports Physical Therapy  Fellow, [employer] Manual Physical Therapists

## 2024-03-12 NOTE — PROGRESS NOTES
I assume primary medical responsibility for this patient. I have reviewed the history, physical, and assessment & treatment plan with the resident and agree that the care is reasonable and necessary. This service has been performed by a resident without the presence of a teaching physician under the primary care exception. If necessary, an addendum of additional findings or evaluation beyond the resident documentation will be noted below.        Miles Pollock Jr., DO    [facility]

## 2024-03-21 ENCOUNTER — TELEPHONE (OUTPATIENT)
Dept: FAMILY MEDICINE | Facility: HOSPITAL | Age: 49
End: 2024-03-21
Payer: MEDICAID

## 2024-03-21 NOTE — TELEPHONE ENCOUNTER
----- Message from Dorcas Mancilla LPN sent at 3/21/2024  4:10 PM CDT -----  Contact: Pt    ----- Message -----  From: Marcos Hoffmann  Sent: 3/21/2024   1:08 PM CDT  To: [facility] Staff    .Type:  Needs Medical Advice    Who Called: pt  Symptoms (please be specific):  red hot flashes on her face headaches.  Would the patient rather a call back or a response via [facility]?  Call back  Best Call Back Number:  [phone]  Additional Information: Pt.  is calling regarding the medication amLODIPine (NORVASC) 5 MG tablet  she states she is notice red hot flashes in her face and would like someone to call her back.    Called patient 3/21/24  Inquired about her symptoms, she states that she has headaches and warm face without swelling, no shortness of breath, chest pain, or palpitations. She states that these symptoms started when she first started taking the medication on the 4th. She states that she has been taking her amlodipine every day and has had the symptoms almost every day. Told patient to discontinue the medication.     Asked patient of any immediate symptoms that she should go to the ED for such as throat closing and shortness of breath or very elevated heart rate which she currently denied and she did not take the medication today. Gave patient return precautions to the ED which she endorsed understanding.    Told patient to make an appointment with \[facility]\"" family medicine clinic to discuss medication management and alternate for hypertension management.      Ru Aguila MD  \[facility]\"" Family Medicine, PGY-1  03/21/2024

## 2024-03-25 ENCOUNTER — CLINICAL SUPPORT (OUTPATIENT)
Dept: REHABILITATION | Facility: HOSPITAL | Age: 49
End: 2024-03-25
Payer: MEDICAID

## 2024-03-25 DIAGNOSIS — M54.50 ACUTE LEFT-SIDED LOW BACK PAIN WITHOUT SCIATICA: Primary | ICD-10-CM

## 2024-03-25 DIAGNOSIS — M25.552 HIP PAIN, LEFT: ICD-10-CM

## 2024-03-25 PROCEDURE — 97110 THERAPEUTIC EXERCISES: CPT

## 2024-04-16 ENCOUNTER — OFFICE VISIT (OUTPATIENT)
Dept: FAMILY MEDICINE | Facility: HOSPITAL | Age: 49
End: 2024-04-16
Payer: MEDICAID

## 2024-04-16 VITALS
OXYGEN SATURATION: 98 % | DIASTOLIC BLOOD PRESSURE: 81 MMHG | SYSTOLIC BLOOD PRESSURE: 162 MMHG | HEART RATE: 78 BPM | WEIGHT: 257.5 LBS | HEIGHT: 56 IN | BODY MASS INDEX: 57.93 KG/M2

## 2024-04-16 DIAGNOSIS — R05.8 POST-VIRAL COUGH SYNDROME: ICD-10-CM

## 2024-04-16 DIAGNOSIS — I10 HYPERTENSION, UNSPECIFIED TYPE: Primary | ICD-10-CM

## 2024-04-16 DIAGNOSIS — R20.0 FINGER NUMBNESS: ICD-10-CM

## 2024-04-16 PROCEDURE — 99213 OFFICE O/P EST LOW 20 MIN: CPT

## 2024-04-16 NOTE — PROGRESS NOTES
[facility] NOTE  Follow-up Visit      SUBJECTIVE:     Patient: Sydnee Don is a 49 y.o. female.    Chief Compliant:   Chief Complaint   Patient presents with    Cough    Numbness       History of Present Illness:  49 year olf demale PMHx of obesity, HTN, HLD presenting in clinic today for follow up.     #HTN  Started on amlodipine 5 at prior visit, however, endorses she started having flushing and hot flashes daily since starting medications, which had stopped after she ceased taking the amlodipine. BP today 162/81. Has not been taking amlodipine due to above symptoms. Patient amenable to starting alternative antihypertensive mediation.    #Cough  Endorses upper respiratory illness last week which she experienced cough, fatigue, and fevers. She has since gotten better, but now still has linger cough without sputum.     #Numbness  Endorses numbness to fingertips without pain. Denies being on the computer or endorsing gripping motion frequently throughout day to day activities. Denies any radiation of numbness or pain. Negative Phalen's and Tinel's. Instructed patient to continue to monitor and that     Review of Systems   Constitutional:  Negative for chills and fever.   Eyes:  Negative for blurred vision.   Respiratory:  Positive for cough. Negative for shortness of breath.    Cardiovascular:  Negative for chest pain.   Gastrointestinal:  Negative for constipation, diarrhea, nausea and vomiting.   Genitourinary:  Negative for dysuria.   Musculoskeletal:  Negative for back pain and myalgias.   Neurological:  Positive for tingling. Negative for weakness and headaches.     A 10+ review of systems was performed with pertinent positives and negatives noted above in the history of present illness. Other systems were negative unless otherwise specified.    OBJECTIVE:     Vital Signs (Most Recent)  Vitals:    04/16/24 1551   BP: (!) 162/81   Pulse: 78   SpO2: 98%   Weight: 116.8 kg (257 lb 8 oz)  "  Height: 4' 8" (1.422 m)     BMI: Body mass index is 57.73 kg/m².     Physical Exam:  Physical Exam  Constitutional:       Appearance: Normal appearance. She is normal weight.   HENT:      Head: Normocephalic and atraumatic.      Mouth/Throat:      Mouth: Mucous membranes are moist.      Pharynx: Oropharynx is clear.   Eyes:      Extraocular Movements: Extraocular movements intact.      Conjunctiva/sclera: Conjunctivae normal.      Pupils: Pupils are equal, round, and reactive to light.   Cardiovascular:      Rate and Rhythm: Normal rate and regular rhythm.      Pulses: Normal pulses.      Heart sounds: Normal heart sounds.   Pulmonary:      Effort: Pulmonary effort is normal.      Breath sounds: Normal breath sounds.   Abdominal:      General: Abdomen is flat.      Palpations: Abdomen is soft.   Musculoskeletal:         General: Normal range of motion.      Cervical back: Normal range of motion and neck supple.      Comments: Negative Tinel's, negative Phalen's both hands bilaterally. Sensation intact to distal fingertips both L and R.    Skin:     General: Skin is warm and dry.      Capillary Refill: Capillary refill takes less than 2 seconds.   Neurological:      General: No focal deficit present.      Mental Status: She is alert and oriented to person, place, and time.          ASSESSMENT:   Sydnee Don is a 49 y.o. female who presents to clinic for follow up HTN    1. Hypertension, unspecified type    2. Post-viral cough syndrome    3. Finger numbness         PLAN:     Hypertension, unspecified type  -     hydroCHLOROthiazide (HYDRODIURIL) 25 MG tablet; Take 1 tablet (25 mg total) by mouth once daily.  Dispense: 30 tablet; Refill: 2  - Will start HCTZ and re-assess response and adherence before starting patient on additional antihypertensive given stage II HTN.  -  Instructed to follow up in a week.     Post-viral cough syndrome  - Lingering cough likely 2/2 to linger URI.  - Instructed patient to continue " supportive care including adequate oral hydration, PO intake, and rest.     Finger numbness  - Peripheral neuropathy unlikely related to carpal tunnel at this point given negative Phalen and Tinel negative.   - Metabolic derangements unlikely given wnl CMP from October, however, if continued symptoms, will repeat labwork including CMP, ESR, TSH, B12.     Provided patient with anticipatory guidance and return precautions. Treatment plan discussed with patient, all questions answered, and patient acknowledged understanding and verbal agreement.      Follow-up in: 1 weeks; or sooner PRN if acute concerns arise.      Case discussed with Dr. ДМИТРИЙ Esparza Do, MD  [facility] PGY-1

## 2024-04-17 RX ORDER — HYDROCHLOROTHIAZIDE 25 MG/1
25 TABLET ORAL DAILY
Qty: 30 TABLET | Refills: 2 | OUTPATIENT
Start: 2024-04-17 | End: 2024-04-24

## 2024-04-18 ENCOUNTER — OFFICE VISIT (OUTPATIENT)
Dept: URGENT CARE | Facility: CLINIC | Age: 49
End: 2024-04-18
Payer: MEDICAID

## 2024-04-18 VITALS
OXYGEN SATURATION: 97 % | RESPIRATION RATE: 17 BRPM | WEIGHT: 257 LBS | HEART RATE: 72 BPM | SYSTOLIC BLOOD PRESSURE: 134 MMHG | TEMPERATURE: 99 F | DIASTOLIC BLOOD PRESSURE: 78 MMHG | BODY MASS INDEX: 57.81 KG/M2 | HEIGHT: 56 IN

## 2024-04-18 DIAGNOSIS — J06.9 URI, ACUTE: ICD-10-CM

## 2024-04-18 DIAGNOSIS — R30.0 DYSURIA: Primary | ICD-10-CM

## 2024-04-18 DIAGNOSIS — N30.00 ACUTE CYSTITIS WITHOUT HEMATURIA: ICD-10-CM

## 2024-04-18 LAB
B-HCG UR QL: NEGATIVE
BILIRUB UR QL STRIP: NEGATIVE
CTP QC/QA: YES
GLUCOSE UR QL STRIP: NEGATIVE
KETONES UR QL STRIP: NEGATIVE
LEUKOCYTE ESTERASE UR QL STRIP: POSITIVE
PH, POC UA: 5.5 (ref 5–8)
POC BLOOD, URINE: POSITIVE
POC NITRATES, URINE: NEGATIVE
PROT UR QL STRIP: POSITIVE
SP GR UR STRIP: 1.02 (ref 1–1.03)
UROBILINOGEN UR STRIP-ACNC: ABNORMAL (ref 0.1–1.1)

## 2024-04-18 PROCEDURE — 87086 URINE CULTURE/COLONY COUNT: CPT | Performed by: FAMILY MEDICINE

## 2024-04-18 PROCEDURE — 81025 URINE PREGNANCY TEST: CPT | Mod: S$GLB,,, | Performed by: FAMILY MEDICINE

## 2024-04-18 PROCEDURE — 81003 URINALYSIS AUTO W/O SCOPE: CPT | Mod: QW,S$GLB,, | Performed by: FAMILY MEDICINE

## 2024-04-18 PROCEDURE — 87088 URINE BACTERIA CULTURE: CPT | Performed by: FAMILY MEDICINE

## 2024-04-18 PROCEDURE — 87077 CULTURE AEROBIC IDENTIFY: CPT | Performed by: FAMILY MEDICINE

## 2024-04-18 PROCEDURE — 99214 OFFICE O/P EST MOD 30 MIN: CPT | Mod: S$GLB,,, | Performed by: FAMILY MEDICINE

## 2024-04-18 PROCEDURE — 87186 SC STD MICRODIL/AGAR DIL: CPT | Performed by: FAMILY MEDICINE

## 2024-04-18 RX ORDER — LORATADINE 10 MG/1
10 TABLET ORAL DAILY
Qty: 30 TABLET | Refills: 2 | Status: SHIPPED | OUTPATIENT
Start: 2024-04-18 | End: 2025-04-18

## 2024-04-18 RX ORDER — SULFAMETHOXAZOLE AND TRIMETHOPRIM 800; 160 MG/1; MG/1
1 TABLET ORAL 2 TIMES DAILY
Qty: 14 TABLET | Refills: 0 | OUTPATIENT
Start: 2024-04-18 | End: 2024-04-24

## 2024-04-18 RX ORDER — ALBUTEROL SULFATE 90 UG/1
2 AEROSOL, METERED RESPIRATORY (INHALATION) EVERY 6 HOURS PRN
Qty: 18 G | Refills: 3 | Status: SHIPPED | OUTPATIENT
Start: 2024-04-18

## 2024-04-18 RX ORDER — PHENAZOPYRIDINE HYDROCHLORIDE 200 MG/1
200 TABLET, FILM COATED ORAL 3 TIMES DAILY PRN
Qty: 15 TABLET | Refills: 0 | Status: SHIPPED | OUTPATIENT
Start: 2024-04-18 | End: 2025-04-18

## 2024-04-18 NOTE — PROGRESS NOTES
"Subjective:      Patient ID: Sydnee Don is a 49 y.o. female.    Vitals:  height is 4' 8" (1.422 m) and weight is 116.6 kg (257 lb). Her oral temperature is 98.6 °F (37 °C). Her blood pressure is 134/78 and her pulse is 72. Her respiration is 17 and oxygen saturation is 97%.     Chief Complaint: Dysuria    This is a 49 y.o. female who presents today with a chief complaint of frequency, urgency, chills. Patient sx started 2 weeks ago and she is not taking any medications.  Started with dysuria 3 days ago, no hx of renal colic  Denies fever or chills  Also c/o cough and chest congestion and occasionally wheezing      Dysuria   This is a new problem. The current episode started acute onset. The problem occurs every urination. The problem has been unchanged. The quality of the pain is described as burning. The pain is at a severity of 5/10. The pain is mild. There has been no fever. She is Sexually active. There is No history of pyelonephritis. Associated symptoms include chills, flank pain, frequency and urgency. Pertinent negatives include no discharge, hematuria, hesitancy, nausea, possible pregnancy, bubble bath use or constipation.       Constitution: Positive for chills.   Gastrointestinal:  Negative for nausea and constipation.   Genitourinary:  Positive for dysuria, frequency, urgency and flank pain. Negative for hematuria.      Objective:     Physical Exam   Constitutional: She is oriented to person, place, and time. She appears well-developed. She is cooperative.  Non-toxic appearance. She does not appear ill. No distress.   HENT:   Head: Normocephalic and atraumatic.   Ears:   Right Ear: Hearing, tympanic membrane, external ear and ear canal normal.   Left Ear: Hearing, tympanic membrane, external ear and ear canal normal.   Nose: Nose normal. No mucosal edema, rhinorrhea or nasal deformity. No epistaxis. Right sinus exhibits no maxillary sinus tenderness and no frontal sinus tenderness. Left sinus " exhibits no maxillary sinus tenderness and no frontal sinus tenderness.   Mouth/Throat: Uvula is midline, oropharynx is clear and moist and mucous membranes are normal. Mucous membranes are moist. No trismus in the jaw. Normal dentition. No uvula swelling. No posterior oropharyngeal erythema. Oropharynx is clear.   Eyes: Conjunctivae and lids are normal. Pupils are equal, round, and reactive to light. Right eye exhibits no discharge. Left eye exhibits no discharge. No scleral icterus. Extraocular movement intact   Neck: Trachea normal and phonation normal. Neck supple.   Cardiovascular: Normal rate, regular rhythm, normal heart sounds and normal pulses.   Pulmonary/Chest: Effort normal and breath sounds normal. No respiratory distress.   Abdominal: Normal appearance and bowel sounds are normal. She exhibits no distension and no mass. Soft. There is no abdominal tenderness. There is no left CVA tenderness and no right CVA tenderness.   Musculoskeletal: Normal range of motion.         General: No deformity. Normal range of motion.   Neurological: She is alert and oriented to person, place, and time. She exhibits normal muscle tone. Coordination normal.   Skin: Skin is warm, dry, intact, not diaphoretic and not pale.   Psychiatric: Her speech is normal and behavior is normal. Judgment and thought content normal.   Nursing note and vitals reviewed.      Assessment:     1. Dysuria    2. Acute cystitis without hematuria        Plan:       Dysuria  -     POCT Urinalysis, Dipstick, Automated, W/O Scope  -     POCT urine pregnancy    Acute cystitis without hematuria    Other orders  -     sulfamethoxazole-trimethoprim 800-160mg (BACTRIM DS) 800-160 mg Tab; Take 1 tablet by mouth 2 (two) times daily. for 7 days  Dispense: 14 tablet; Refill: 0  -     phenazopyridine (PYRIDIUM) 200 MG tablet; Take 1 tablet (200 mg total) by mouth 3 (three) times daily as needed for Pain.  Dispense: 15 tablet; Refill: 0           Results for  orders placed or performed in visit on 04/18/24   POCT Urinalysis, Dipstick, Automated, W/O Scope   Result Value Ref Range    POC Blood, Urine Positive (A) Negative    POC Bilirubin, Urine Negative Negative    POC Urobilinogen, Urine Norm 0.1 - 1.1    POC Ketones, Urine Negative Negative    POC Protein, Urine Positive (A) Negative    POC Nitrates, Urine Negative Negative    POC Glucose, Urine Negative Negative    pH, UA 5.5 5 - 8    POC Specific Gravity, Urine 1.020 1.003 - 1.029    POC Leukocytes, Urine Positive (A) Negative   POCT urine pregnancy   Result Value Ref Range    POC Preg Test, Ur Negative Negative     Acceptable Yes

## 2024-04-20 LAB — BACTERIA UR CULT: ABNORMAL

## 2024-04-21 ENCOUNTER — TELEPHONE (OUTPATIENT)
Dept: URGENT CARE | Facility: CLINIC | Age: 49
End: 2024-04-21
Payer: MEDICAID

## 2024-04-21 NOTE — TELEPHONE ENCOUNTER
Attempted to call patient to discuss positive urine culture results.  No answer.  Patient treated appropriately with Bactrim.  Left voicemail to call back.

## 2024-04-21 NOTE — TELEPHONE ENCOUNTER
Spoke with patient discuss positive urine culture results.  Patient treated appropriately Bactrim.  Advised to complete a course of antibiotics.  Re-evaluation if no improvement symptoms. Patient voiced understanding and in agreement with current treatment plan.

## 2024-04-22 ENCOUNTER — HOSPITAL ENCOUNTER (EMERGENCY)
Facility: HOSPITAL | Age: 49
Discharge: HOME | End: 2024-04-22
Attending: STUDENT IN AN ORGANIZED HEALTH CARE EDUCATION/TRAINING PROGRAM
Payer: MEDICAID

## 2024-04-22 VITALS
OXYGEN SATURATION: 96 % | HEART RATE: 91 BPM | HEIGHT: 56 IN | DIASTOLIC BLOOD PRESSURE: 74 MMHG | BODY MASS INDEX: 57.81 KG/M2 | TEMPERATURE: 99 F | RESPIRATION RATE: 18 BRPM | SYSTOLIC BLOOD PRESSURE: 122 MMHG | WEIGHT: 257 LBS

## 2024-04-22 DIAGNOSIS — J18.9 ATYPICAL PNEUMONIA: ICD-10-CM

## 2024-04-22 DIAGNOSIS — N12 PYELONEPHRITIS: Primary | ICD-10-CM

## 2024-04-22 DIAGNOSIS — R10.9 LEFT FLANK PAIN: ICD-10-CM

## 2024-04-22 LAB
ALBUMIN SERPL BCP-MCNC: 3.2 G/DL (ref 3.5–5.2)
ALP SERPL-CCNC: 62 U/L (ref 55–135)
ALT SERPL W/O P-5'-P-CCNC: 19 U/L (ref 10–44)
ANION GAP SERPL CALC-SCNC: 11 MMOL/L (ref 8–16)
AST SERPL-CCNC: 17 U/L (ref 10–40)
B-HCG UR QL: NEGATIVE
BACTERIA #/AREA URNS HPF: ABNORMAL /HPF
BASOPHILS # BLD AUTO: 0.01 K/UL (ref 0–0.2)
BASOPHILS NFR BLD: 0.2 % (ref 0–1.9)
BILIRUB SERPL-MCNC: 0.2 MG/DL (ref 0.1–1)
BILIRUB UR QL STRIP: NEGATIVE
BUN SERPL-MCNC: 11 MG/DL (ref 6–20)
CALCIUM SERPL-MCNC: 8.7 MG/DL (ref 8.7–10.5)
CHLORIDE SERPL-SCNC: 104 MMOL/L (ref 95–110)
CLARITY UR: CLEAR
CO2 SERPL-SCNC: 19 MMOL/L (ref 23–29)
COLOR UR: YELLOW
CREAT SERPL-MCNC: 0.8 MG/DL (ref 0.5–1.4)
CTP QC/QA: YES
DIFFERENTIAL METHOD BLD: ABNORMAL
EOSINOPHIL # BLD AUTO: 0.3 K/UL (ref 0–0.5)
EOSINOPHIL NFR BLD: 5.7 % (ref 0–8)
ERYTHROCYTE [DISTWIDTH] IN BLOOD BY AUTOMATED COUNT: 13.4 % (ref 11.5–14.5)
EST. GFR  (NO RACE VARIABLE): >60 ML/MIN/1.73 M^2
GLUCOSE SERPL-MCNC: 115 MG/DL (ref 70–110)
GLUCOSE UR QL STRIP: NEGATIVE
HCT VFR BLD AUTO: 37.9 % (ref 37–48.5)
HGB BLD-MCNC: 12.9 G/DL (ref 12–16)
HGB UR QL STRIP: ABNORMAL
IMM GRANULOCYTES # BLD AUTO: 0.02 K/UL (ref 0–0.04)
IMM GRANULOCYTES NFR BLD AUTO: 0.4 % (ref 0–0.5)
KETONES UR QL STRIP: NEGATIVE
LEUKOCYTE ESTERASE UR QL STRIP: NEGATIVE
LYMPHOCYTES # BLD AUTO: 0.6 K/UL (ref 1–4.8)
LYMPHOCYTES NFR BLD: 10.8 % (ref 18–48)
MCH RBC QN AUTO: 28.6 PG (ref 27–31)
MCHC RBC AUTO-ENTMCNC: 34 G/DL (ref 32–36)
MCV RBC AUTO: 84 FL (ref 82–98)
MICROSCOPIC COMMENT: ABNORMAL
MONOCYTES # BLD AUTO: 0.5 K/UL (ref 0.3–1)
MONOCYTES NFR BLD: 9.2 % (ref 4–15)
NEUTROPHILS # BLD AUTO: 3.9 K/UL (ref 1.8–7.7)
NEUTROPHILS NFR BLD: 73.7 % (ref 38–73)
NITRITE UR QL STRIP: NEGATIVE
NRBC BLD-RTO: 0 /100 WBC
PH UR STRIP: 6 [PH] (ref 5–8)
PLATELET # BLD AUTO: 225 K/UL (ref 150–450)
PMV BLD AUTO: 9.9 FL (ref 9.2–12.9)
POTASSIUM SERPL-SCNC: 4.5 MMOL/L (ref 3.5–5.1)
PROT SERPL-MCNC: 6.7 G/DL (ref 6–8.4)
PROT UR QL STRIP: NEGATIVE
RBC # BLD AUTO: 4.51 M/UL (ref 4–5.4)
RBC #/AREA URNS HPF: 7 /HPF (ref 0–4)
SODIUM SERPL-SCNC: 134 MMOL/L (ref 136–145)
SP GR UR STRIP: 1.02 (ref 1–1.03)
SQUAMOUS #/AREA URNS HPF: 4 /HPF
URN SPEC COLLECT METH UR: ABNORMAL
UROBILINOGEN UR STRIP-ACNC: NEGATIVE EU/DL
WBC # BLD AUTO: 5.3 K/UL (ref 3.9–12.7)
WBC #/AREA URNS HPF: 1 /HPF (ref 0–5)

## 2024-04-22 PROCEDURE — 25000003 PHARM REV CODE 250: Performed by: STUDENT IN AN ORGANIZED HEALTH CARE EDUCATION/TRAINING PROGRAM

## 2024-04-22 PROCEDURE — 81025 URINE PREGNANCY TEST: CPT | Performed by: STUDENT IN AN ORGANIZED HEALTH CARE EDUCATION/TRAINING PROGRAM

## 2024-04-22 PROCEDURE — 96374 THER/PROPH/DIAG INJ IV PUSH: CPT

## 2024-04-22 PROCEDURE — 96361 HYDRATE IV INFUSION ADD-ON: CPT

## 2024-04-22 PROCEDURE — 63600175 PHARM REV CODE 636 W HCPCS: Performed by: STUDENT IN AN ORGANIZED HEALTH CARE EDUCATION/TRAINING PROGRAM

## 2024-04-22 PROCEDURE — 80053 COMPREHEN METABOLIC PANEL: CPT | Performed by: STUDENT IN AN ORGANIZED HEALTH CARE EDUCATION/TRAINING PROGRAM

## 2024-04-22 PROCEDURE — 85025 COMPLETE CBC W/AUTO DIFF WBC: CPT | Performed by: STUDENT IN AN ORGANIZED HEALTH CARE EDUCATION/TRAINING PROGRAM

## 2024-04-22 PROCEDURE — 99285 EMERGENCY DEPT VISIT HI MDM: CPT | Mod: 25

## 2024-04-22 PROCEDURE — 81000 URINALYSIS NONAUTO W/SCOPE: CPT | Performed by: STUDENT IN AN ORGANIZED HEALTH CARE EDUCATION/TRAINING PROGRAM

## 2024-04-22 RX ORDER — SULFAMETHOXAZOLE AND TRIMETHOPRIM 800; 160 MG/1; MG/1
1 TABLET ORAL 2 TIMES DAILY
Qty: 6 TABLET | Refills: 0 | OUTPATIENT
Start: 2024-04-22 | End: 2024-04-24

## 2024-04-22 RX ORDER — KETOROLAC TROMETHAMINE 30 MG/ML
15 INJECTION, SOLUTION INTRAMUSCULAR; INTRAVENOUS
Status: COMPLETED | OUTPATIENT
Start: 2024-04-22 | End: 2024-04-22

## 2024-04-22 RX ORDER — AZITHROMYCIN 250 MG/1
250 TABLET, FILM COATED ORAL DAILY
Qty: 6 TABLET | Refills: 0 | OUTPATIENT
Start: 2024-04-22 | End: 2024-04-24

## 2024-04-22 RX ORDER — OXYCODONE AND ACETAMINOPHEN 5; 325 MG/1; MG/1
1 TABLET ORAL
Status: COMPLETED | OUTPATIENT
Start: 2024-04-22 | End: 2024-04-22

## 2024-04-22 RX ORDER — KETOROLAC TROMETHAMINE 10 MG/1
10 TABLET, FILM COATED ORAL EVERY 8 HOURS
Qty: 15 TABLET | Refills: 0 | OUTPATIENT
Start: 2024-04-22 | End: 2024-04-24

## 2024-04-22 RX ORDER — OXYCODONE AND ACETAMINOPHEN 5; 325 MG/1; MG/1
1 TABLET ORAL EVERY 6 HOURS PRN
Qty: 10 TABLET | Refills: 0 | OUTPATIENT
Start: 2024-04-22 | End: 2024-04-24

## 2024-04-22 RX ADMIN — SODIUM CHLORIDE 1000 ML: 9 INJECTION, SOLUTION INTRAVENOUS at 01:04

## 2024-04-22 RX ADMIN — KETOROLAC TROMETHAMINE 15 MG: 30 INJECTION, SOLUTION INTRAMUSCULAR; INTRAVENOUS at 01:04

## 2024-04-22 RX ADMIN — OXYCODONE HYDROCHLORIDE AND ACETAMINOPHEN 1 TABLET: 5; 325 TABLET ORAL at 04:04

## 2024-04-22 NOTE — ED PROVIDER NOTES
ED Provider Note - 2024    History     Chief Complaint   Patient presents with    Flank Pain     C/O left sided flank pain. States she was yandy with UTI a week ago. States she is still on antibiotics.       ABNER Don is a 49 y.o. year old female with past medical and surgical history as seen below, presenting with chief complaint of   Left-sided flank pain.  This has been ongoing for the past few days.  Recently diagnosed with urinary tract infection.  On Bactrim.  New symptom is in associated pain underneath the left breast at the distal ribcage.  Has also had ongoing intermittent fevers.  No cough, cold, congestion.  No shortness of breath.  No chest pain.        Past Medical History:   Diagnosis Date    Anxiety and depression 2021    Cervical radiculopathy     Iron deficiency anemia due to chronic blood loss 2018    Rosacea 2021    Stress incontinence      Past Surgical History:   Procedure Laterality Date     SECTION           Family History   Problem Relation Name Age of Onset    Hyperlipidemia Father      Hypertension Father      Hyperlipidemia Mother      Hypertension Mother      Arthritis Mother      Breast cancer Paternal Aunt       Social History     Tobacco Use    Smoking status: Never     Passive exposure: Never    Smokeless tobacco: Never   Substance Use Topics    Alcohol use: No    Drug use: No     Social Determinants of Health with Concerns     Food Insecurity: Unknown (2022)    [facility]    Hunger Vital Sign     Worried About Running Out of Food in the Last Year: Patient declined     Ran Out of Food in the Last Year: Patient declined   Physical Activity: Inactive (2022)    [facility]    Exercise Vital Sign     Days of Exercise per Week: 0 days     Minutes of Exercise per Session: 0 min   Social Connections: Moderately Isolated (2022)    [facility]    Social  Connection and Isolation Panel [NHANES]     Frequency of Communication with Friends and Family: Three times a week     Frequency of Social Gatherings with Friends and Family: Twice a week     Attends [employer] Services: Never     Active Member of Clubs or Organizations: No     Attends Club or Organization Meetings: Never     Marital Status:    Housing Stability: Unknown (9/26/2022)    [facility]    Housing Stability Vital Sign     Unable to Pay for Housing in the Last Year: No     Number of Places Lived in the Last Year: Not on file     In the last 12 months, was there a time when you did not have a steady place to sleep or slept in a shelter (including now)?: No      Review of patient's allergies indicates:  No Known Allergies    Review of Systems     A full Review of Systems (ROS) was performed and was negative unless otherwise stated in the HPI.      Physical Exam     Vitals:    04/22/24 0301 04/22/24 0401 04/22/24 0411 04/22/24 0417   BP: 115/69 122/74     BP Location:       Patient Position:       Pulse: 92 91     Resp:   18    Temp:    98.9 °F (37.2 °C)   TempSrc:    Oral   SpO2: 96% 96%     Weight:       Height:            Physical Exam    Nursing note and vitals reviewed.  Constitutional: She appears well-developed and well-nourished. No distress.   HENT:   Head: Normocephalic and atraumatic.   Right Ear: External ear normal.   Left Ear: External ear normal.   Nose: Nose normal.   Mouth/Throat: Oropharynx is clear and moist.   Eyes: Conjunctivae and EOM are normal. Pupils are equal, round, and reactive to light.   Neck: Neck supple.   Normal range of motion.  Cardiovascular:  Normal rate, regular rhythm, normal heart sounds and intact distal pulses.           Pulmonary/Chest: Breath sounds normal. No stridor. No respiratory distress. She has no wheezes. She has no rhonchi. She has no rales.   Abdominal: Abdomen is soft. Bowel sounds are normal. There is no abdominal  tenderness.   Musculoskeletal:         General: No tenderness or edema. Normal range of motion.      Cervical back: Normal range of motion and neck supple.     Neurological: She is alert and oriented to person, place, and time. She has normal strength. No cranial nerve deficit or sensory deficit.   Skin: Skin is warm and dry. No rash noted.   Psychiatric: She has a normal mood and affect. Thought content normal.         Lab Results- Independently reviewed by myself      Labs Reviewed   URINALYSIS, REFLEX TO URINE CULTURE - Abnormal; Notable for the following components:       Result Value    Occult Blood UA 1+ (*)     All other components within normal limits    Narrative:     Specimen Source->Urine   URINALYSIS MICROSCOPIC - Abnormal; Notable for the following components:    RBC, UA 7 (*)     All other components within normal limits    Narrative:     Specimen Source->Urine   CBC W/ AUTO DIFFERENTIAL - Abnormal; Notable for the following components:    Lymph # 0.6 (*)     Gran % 73.7 (*)     Lymph % 10.8 (*)     All other components within normal limits   COMPREHENSIVE METABOLIC PANEL - Abnormal; Notable for the following components:    Sodium 134 (*)     CO2 19 (*)     Glucose 115 (*)     Albumin 3.2 (*)     All other components within normal limits   POCT URINE PREGNANCY           Imaging     Imaging Results              CT Renal Stone Study ABD Pelvis WO (Final result)  Result time 04/22/24 03:11:35      Final result by Ange Feliz MD (04/22/24 03:11:35)                   Impression:      1. Clustered left lower lobe pulmonary nodules/micronodules which can be seen with reactive airways disease/atypical infectious process.  Clinical correlation advised.  Short-term repeat chest CT follow-up in 3 months is advised.  2. Minimal right perinephric fat stranding.  Slight asymmetric prominence of the distal right ureter with periureteral stranding.  Findings could reflect recently passed stone and/or infectious  process.  Correlation with urinalysis advised.  3. Additional findings as above.      Electronically signed by: Ange Feliz MD  Date:    04/22/2024  Time:    03:11               Narrative:    EXAMINATION:  CT RENAL STONE STUDY ABD PELVIS WO    CLINICAL HISTORY:  Flank pain, kidney stone suspected;    TECHNIQUE:  Low dose axial images, sagittal and coronal reformations were obtained from the lung bases to the pubic symphysis.  Contrast was not administered.    COMPARISON:  None    FINDINGS:  The visualized lung bases are free of pleural fluid or focal consolidation.  There are clustered pulmonary nodules/micronodules in the left lower lobe which can be seen with underlying small airways disease/atypical infectious process.  Clinical correlation advised.  Repeat chest CT in 3 months is also advised to evaluate for stability and/or resolution of this finding.  The visualized portions of the heart and pericardium are within normal limits.    Please note evaluation of solid organ parenchyma is limited due to lack of IV contrast.  The liver, spleen, pancreas and adrenal glands demonstrate an unremarkable noncontrast CT appearance.  The gallbladder is contracted.  No calcified stone in the gallbladder lumen.    The kidneys are normal in size and location.  Minimal right perinephric fat stranding.  There is no evidence of hydronephrosis or nephrolithiasis.  There is minimal prominence of the right ureter with periureteral stranding which could reflect recently passed stone or ascending urinary tract infection.  Correlation with urinalysis advised.  The urinary bladder is distended with smooth margins.  The uterus and adnexal regions are within normal limits.    The abdominal aorta is nonaneurysmal with shotty periaortic lymph nodes.    The visualized loops of large and small bowel demonstrate no evidence of obstruction or inflammatory change.  Moderate to large volume of fecal material in the colon.  The appendix is  unremarkable.  There is no free intraperitoneal air, portal venous gas or ascites.    The visualized osseous structures are intact.  Small fat containing umbilical hernia.                                    X-Rays:   Independently Interpreted Readings:   Abdomen:   Renal Stone Study -   No free air or urolith               ED Course         Procedures         Orders Placed This Encounter    CT Renal Stone Study ABD Pelvis WO    Urinalysis, Reflex to Urine Culture Urine, Clean Catch    Urinalysis Microscopic    CBC auto differential    Comprehensive metabolic panel    POCT urine pregnancy    Insert Saline lock IV    sodium chloride 0.9% bolus 1,000 mL 1,000 mL    ketorolac injection 15 mg    oxyCODONE-acetaminophen 5-325 mg per tablet 1 tablet    oxyCODONE-acetaminophen (PERCOCET) 5-325 mg per tablet    ketorolac (TORADOL) 10 mg tablet    azithromycin (Z-NICOLAS) 250 MG tablet    sulfamethoxazole-trimethoprim 800-160mg (BACTRIM DS) 800-160 mg Tab          ED Course as of 04/22/24 0508   Mon Apr 22, 2024   0114 Results for orders placed or performed in visit on 04/18/24  POCT Urinalysis, Dipstick, Automated, W/O Scope  Result Value Ref Range    POC Blood, Urine Positive (A) Negative    POC Bilirubin, Urine Negative Negative    POC Urobilinogen, Urine Norm 0.1 - 1.1    POC Ketones, Urine Negative Negative    POC Protein, Urine Positive (A) Negative    POC Nitrates, Urine Negative Negative    POC Glucose, Urine Negative Negative    pH, UA 5.5 5 - 8    POC Specific Gravity, Urine 1.020 1.003 - 1.029    POC Leukocytes, Urine Positive (A) Negative  POCT urine pregnancy  Result Value Ref Range    POC Preg Test, Ur Negative Negative     Acceptable Yes       [KB]   0115    Component 4 d ago  Urine Culture, Routine  Abnormal   ESCHERICHIA COLI  >100,000 cfu/ml   Resulting Agency [facility]      Susceptibility       Escherichia coli    CULTURE, URINE    Amox/K Clav'ate <=8/4 mcg/mL Sensitive    Amp/Sulbactam <=8/4  mcg/mL Sensitive    Ampicillin <=8 mcg/mL Sensitive    Cefazolin <=2 mcg/mL Sensitive    Cefepime <=2 mcg/mL Sensitive    Ceftriaxone <=1 mcg/mL Sensitive    Ciprofloxacin <=1 mcg/mL Sensitive    Ertapenem <=0.5 mcg/mL Sensitive    Gentamicin <=4 mcg/mL Sensitive    Levofloxacin <=2 mcg/mL Sensitive    Meropenem <=1 mcg/mL Sensitive    Nitrofurantoin <=32 mcg/mL Sensitive    Piperacillin/Tazo <=16 mcg/mL Sensitive    Tobramycin <=4 mcg/mL Sensitive    Trimeth/Sulfa <=2/38 mcg/mL Sensitive       [KB]   9444 CBC auto differential(!)  CBC: No significant anemia, platelet disorder, or leukocytosis.   [KB]      ED Course User Index  [KB] Jersey Peterson MD              Medical Decision Making       The patient's list of active medical problems, social history, medications, and allergies as documented per RN staff has been reviewed.           Medical Decision Making   49-year-old female presents with left-sided flank and left-sided upper quadrant pain.  Differential includes but is not limited to kidney stone, ureter stone, pyelonephritis, atypical pneumonia, pneumonia, pneumothorax, pleurisy, costochondritis, gastritis, amongst others.  CT scan shows concern for possible residual pyelonephritis.  Urine appears clear and per cultures Bactrim should be appropriate.  Given concern for pyelonephritis I will extend her prescription out to meet the 10-14 day window generally considered adequate for Bactrim therapy.  Will add azithromycin for atypical pneumonia coverage based off of CT scan.  Patient advised to follow up with primary care provider for repeat imaging per Radiology recommendations.  Toradol for baseline pain with Percocet as breakthrough as needed.  Return precautions given for worsening or changing symptoms.    Amount and/or Complexity of Data Reviewed  External Data Reviewed: labs and notes.  Labs: ordered. Decision-making details documented in ED Course.  Radiology: ordered and independent interpretation  performed.    Risk  Prescription drug management.                    ED Prescriptions       Medication Sig Dispense Start Date End Date Auth. Provider    oxyCODONE-acetaminophen (PERCOCET) 5-325 mg per tablet Take 1 tablet by mouth every 6 (six) hours as needed for Pain. 10 tablet 4/22/2024 -- Jersey Peterson MD    ketorolac (TORADOL) 10 mg tablet Take 1 tablet (10 mg total) by mouth every 8 (eight) hours. for 5 days 15 tablet 4/22/2024 4/27/2024 Jersey Peterson MD    azithromycin (Z-NICOLAS) 250 MG tablet Take 1 tablet (250 mg total) by mouth once daily. Take first 2 tablets together, then 1 every day until finished. 6 tablet 4/22/2024 -- Jersey Peterson MD    sulfamethoxazole-trimethoprim 800-160mg (BACTRIM DS) 800-160 mg Tab Take 1 tablet by mouth 2 (two) times daily. for 3 days 6 tablet 4/22/2024 4/25/2024 Jersey Peterson MD              Clinical Impression       Follow-up Information       Follow up With Specialties Details Why Contact Info    Ru Aguila MD Family Medicine Schedule an appointment as soon as possible for a visit in 5 days For follow-up on today's visit. [address] [id number] [phone]      [place] - Emergency Dept Emergency Medicine Go to  As needed, If symptoms worsen [address] [facility] [address] [phone]            Referrals:  No orders of the defined types were placed in this encounter.      Disposition   ED Disposition Condition    Discharge Stable            Diagnosis    ICD-10-CM ICD-9-CM   1. Pyelonephritis  N12 590.80   2. Left flank pain  R10.9 789.09   3. Atypical pneumonia  J18.9 486           Jersey Peterson MD        04/22/2024          DISCLAIMER: This note was prepared with [employer]Jam voice recognition transcription software. Garbled syntax, mangled pronouns, and other bizarre constructions may be attributed to that software system.       Jersey Peterson MD  04/22/24 0591

## 2024-04-22 NOTE — Clinical Note
"Sydnee Emmanuelzac Don was seen and treated in our emergency department on 4/22/2024.  She may return to work on 04/24/2024.       If you have any questions or concerns, please don't hesitate to call.      Jersey Peterson MD"

## 2024-04-23 ENCOUNTER — OFFICE VISIT (OUTPATIENT)
Dept: URGENT CARE | Facility: CLINIC | Age: 49
End: 2024-04-23
Payer: MEDICAID

## 2024-04-23 VITALS
HEART RATE: 77 BPM | RESPIRATION RATE: 17 BRPM | TEMPERATURE: 98 F | WEIGHT: 257.06 LBS | BODY MASS INDEX: 59.49 KG/M2 | DIASTOLIC BLOOD PRESSURE: 75 MMHG | HEIGHT: 55 IN | SYSTOLIC BLOOD PRESSURE: 141 MMHG | OXYGEN SATURATION: 95 %

## 2024-04-23 DIAGNOSIS — T78.40XA ALLERGIC REACTION, INITIAL ENCOUNTER: Primary | ICD-10-CM

## 2024-04-23 PROCEDURE — 99213 OFFICE O/P EST LOW 20 MIN: CPT | Mod: S$GLB,,, | Performed by: NURSE PRACTITIONER

## 2024-04-23 RX ORDER — DEXAMETHASONE SODIUM PHOSPHATE 100 MG/10ML
10 INJECTION INTRAMUSCULAR; INTRAVENOUS
Status: COMPLETED | OUTPATIENT
Start: 2024-04-23 | End: 2024-04-23

## 2024-04-23 RX ADMIN — DEXAMETHASONE SODIUM PHOSPHATE 10 MG: 100 INJECTION INTRAMUSCULAR; INTRAVENOUS at 06:04

## 2024-04-23 NOTE — PATIENT INSTRUCTIONS
PLEASE READ YOUR DISCHARGE INSTRUCTIONS ENTIRELY AS IT CONTAINS IMPORTANT INFORMATION.      Please drink plenty of fluids.  Please get plenty of rest.  Please return here or go to the Emergency Department for any concerns or worsening of condition (worsening rash, difficulty swallowing, shortness of breath, passing out).    If you were prescribed a narcotic medication, do not drive or operate heavy equipment or machinery while taking these medications.    Please take over the counter Zantac as directed for the next 24-72hours as needed.    If you were given a steroid shot in the clinic and have also been given a prescription for a steroid such as Prednisone or a Medrol Dose Pack, please begin taking them tomorrow.    If you have a localized reaction it is ok to apply OTC  topical creams  as directed to the affected area.    Please take an over the counter antihistamine medication (allegra/Claritin/Zyrtec) of your choice as directed.  Benadryl at night - may make you drowsy do not drive after    Please follow up with your primary care doctor or specialist as needed.    If you  smoke, please stop smoking.    Please arrange follow up with your primary medical clinic as soon as possible. You must understand that you've received an Urgent Care treatment only and that you may be released before all of your medical problems are known or treated. You, the patient, will arrange for follow up as instructed. If your symptoms worsen or fail to improve you should go to the Emergency Room.

## 2024-04-23 NOTE — PROGRESS NOTES
"Subjective:      Patient ID: Sydnee Don is a 49 y.o. female.    Vitals:  height is 4' 6" (1.372 m) and weight is 116.6 kg (257 lb 0.9 oz). Her oral temperature is 97.7 °F (36.5 °C). Her blood pressure is 141/75 (abnormal) and her pulse is 77. Her respiration is 17 and oxygen saturation is 95%.     Chief Complaint: Other    Pt coming into clinic with possible reaction, redness and itching all over body,  pt states it may have been from a tea she drank called corn silk tea, sx started last night and got worse today, treatment includes allegra Claritin with no relief.  Provider note below:  This is a 49 y.o. female who presents today with a chief complaint of rash on her back, neck started yesterday after drinking 1 of the Tea called corn silk tea, patient also reported that she is on multiple medications currently for UTI and pneumonia, patient was seen here 1 week ago and was prescribed Bactrim that she is taking for past 6 days, patient reported that they increase her Bactrim to take 3 more days her symptoms and evaluation in the ED, patient reports she was seen in the ED and was diagnosed with atypical pneumonia also and on Z-Ti, patient also taking ketorolac and oxycodone as needed for pain, patient reports she started taking all the other medication that was prescribed yesterday by the ER also, but her rash started after she had the tea, denies any other known exposure to new products or detergent, denies throat closing or trouble swallowing,denies fever, body aches or chills, denies cough, wheezing or shortness of breath, denies nausea, vomiting, diarrhea or abdominal pain, denies chest pain or dizziness positional lightheadedness, denies sore throat or trouble swallowing, denies loss of taste or smell, or any other symptoms       Other  This is a new problem. The current episode started today. The problem occurs constantly. The problem has been unchanged. Associated symptoms include a rash. Pertinent " negatives include no abdominal pain, anorexia, arthralgias, change in bowel habit, chest pain, chills, congestion, coughing, diaphoresis, fatigue, fever, headaches, joint swelling, myalgias, nausea, neck pain, numbness, sore throat, swollen glands, urinary symptoms, vertigo, visual change, vomiting or weakness. Nothing aggravates the symptoms. Treatments tried: allegra. The treatment provided no relief.       Constitution: Negative for chills, sweating, fatigue and fever.   HENT:  Negative for congestion and sore throat.    Neck: Negative for neck pain.   Cardiovascular:  Negative for chest pain.   Respiratory:  Negative for cough.    Gastrointestinal:  Negative for abdominal pain, nausea and vomiting.   Musculoskeletal:  Negative for joint pain, joint swelling and muscle ache.   Skin:  Positive for rash.   Neurological:  Negative for history of vertigo, headaches and numbness.      Objective:     Physical Exam   Constitutional: She is oriented to person, place, and time. She appears well-developed. She is cooperative.  Non-toxic appearance. She does not appear ill. No distress.      Comments:Patient sitting comfortably on the exam table, non toxic appearance  and answering questions appropriately, no acute distress, Talking in full sentences without pause        HENT:   Head: Normocephalic and atraumatic.   Ears:   Right Ear: Hearing, tympanic membrane, external ear and ear canal normal.   Left Ear: Hearing, tympanic membrane, external ear and ear canal normal.   Nose: Nose normal. No mucosal edema, rhinorrhea or nasal deformity. No epistaxis. Right sinus exhibits no maxillary sinus tenderness and no frontal sinus tenderness. Left sinus exhibits no maxillary sinus tenderness and no frontal sinus tenderness.   Mouth/Throat: Uvula is midline, oropharynx is clear and moist and mucous membranes are normal. No trismus in the jaw. Normal dentition. No uvula swelling. No oropharyngeal exudate, posterior oropharyngeal  edema, posterior oropharyngeal erythema, tonsillar abscesses or cobblestoning.   No oropharyngeal erythema, edema or exudate noted, no Talib's or trismus, uvula midline, able to tolerate secretions      Comments: No oropharyngeal erythema, edema or exudate noted, no Talib's or trismus, uvula midline, able to tolerate secretions  Eyes: Conjunctivae and lids are normal. No scleral icterus.   Neck: Trachea normal and phonation normal. Neck supple. No edema present. No erythema present. No neck rigidity present.   Cardiovascular: Normal rate, regular rhythm, normal heart sounds and normal pulses.   Pulmonary/Chest: Effort normal and breath sounds normal. No stridor. No respiratory distress. She has no decreased breath sounds. She has no wheezes. She has no rhonchi. She has no rales.   Abdominal: Normal appearance.   Musculoskeletal: Normal range of motion.         General: No deformity. Normal range of motion.   Neurological: She is alert and oriented to person, place, and time. She exhibits normal muscle tone. Coordination normal.   Skin: Skin is warm, dry, intact, not diaphoretic, not pale, rash (Diffuse erythematous rash noted to neck, back, patient reports generalized pruritus), not urticarial and not maculopapular.   Psychiatric: Her speech is normal and behavior is normal. Judgment and thought content normal.   Nursing note and vitals reviewed.            Patient in no acute distress.  Vitals reassuring.  Discussed results/diagnosis/plan in depth with patient in clinic. Strict precautions given to patient to monitor for worsening signs and symptoms. Advised to follow up with primary.All questions answered. Strict ER precautions given. If your symptoms worsens or fail to improve you should go to the Emergency Room. Discharge and follow-up instructions given verbally/printed. Discharge and follow-up instructions discussed with the patient who expressed understanding and willingness to comply with my  recommendations.Patient voiced understanding and in agreement with current treatment plan.     Please be advised this text was dictated with [employer] software and may contain errors due to translation.   Assessment:     1. Allergic reaction, initial encounter        Plan:       Allergic reaction, initial encounter    Other orders  -     dexAMETHasone injection 10 mg          Medical Decision Making:   History:   Old Medical Records: I decided to obtain old medical records.  Old Records Summarized: records from clinic visits and records from previous admission(s).  Urgent Care Management:  Patient in no acute distress.  Vitals reassuring.  On exam, patient is nontoxic appearing and afebrile.  Lungs CTA.  Physical examination as above.  No anaphylaxis reaction.  Patient reported That her rash started after she had silk tea yesterday.  Patient currently on Bactrim, Z-Ti, Toradol, oxycodone prescribed for atypical pneumonia as well as for UTI/pyelonephritis.  However patient is taking Bactrim for past 6 days prescribed by the urgent care and ER added on 3 more days to complete the course of Bactrim for 10 days.  Patient reported she did not had any rash after starting the Bactrim or after taking 6 days worth of Bactrim but her rash started after drinking tea.  Patient is on multiple medication unsure if her allergic reaction is secondary to any of the medications she is taking.  Will give dexamethasone injection in clinic.  Detailed education provided about side effects and recommendations.  Allergic reaction medications discussed with patient and daughter in detail.  And re-evaluation and further evaluation recommended to go to the ER if no improvement symptoms or worsening symptoms.  Medication prescribed and over-the-counter medication discussed with patient at length.  Proper hydration advised.  I reiterated the importance of further evaluation if no improvement symptoms and follow-up with primary. Patient voiced  understanding and in agreement with current treatment plan.             Patient Instructions   PLEASE READ YOUR DISCHARGE INSTRUCTIONS ENTIRELY AS IT CONTAINS IMPORTANT INFORMATION.      Please drink plenty of fluids.  Please get plenty of rest.  Please return here or go to the Emergency Department for any concerns or worsening of condition (worsening rash, difficulty swallowing, shortness of breath, passing out).    If you were prescribed a narcotic medication, do not drive or operate heavy equipment or machinery while taking these medications.    Please take over the counter Zantac as directed for the next 24-72hours as needed.    If you were given a steroid shot in the clinic and have also been given a prescription for a steroid such as Prednisone or a Medrol Dose Pack, please begin taking them tomorrow.    If you have a localized reaction it is ok to apply OTC  topical creams  as directed to the affected area.    Please take an over the counter antihistamine medication (allegra/Claritin/Zyrtec) of your choice as directed.  Benadryl at night - may make you drowsy do not drive after    Please follow up with your primary care doctor or specialist as needed.    If you  smoke, please stop smoking.    Please arrange follow up with your primary medical clinic as soon as possible. You must understand that you've received an Urgent Care treatment only and that you may be released before all of your medical problems are known or treated. You, the patient, will arrange for follow up as instructed. If your symptoms worsen or fail to improve you should go to the Emergency Room.

## 2024-04-23 NOTE — PROGRESS NOTES
I assume primary medical responsibility for this patient. I have reviewed the history, physical, and assessement & treatment plan with the resident and agree that the care is reasonable and necessary. This service has been performed by a resident without the presence of a teaching physician under the primary care exception. If necessary, an addendum of additional findings or evaluation beyond the resident documentation will be noted below.    Agree with encouragement offered regarding prolonged cough within normal post URI period. Additional BP medication changed warranted due to unforseen reaction to amlodipine. Close follow up needed to assess response.

## 2024-04-24 ENCOUNTER — HOSPITAL ENCOUNTER (EMERGENCY)
Facility: HOSPITAL | Age: 49
Discharge: HOME | End: 2024-04-24
Attending: EMERGENCY MEDICINE
Payer: MEDICAID

## 2024-04-24 VITALS
OXYGEN SATURATION: 97 % | HEART RATE: 90 BPM | TEMPERATURE: 99 F | SYSTOLIC BLOOD PRESSURE: 139 MMHG | BODY MASS INDEX: 61.97 KG/M2 | WEIGHT: 257 LBS | RESPIRATION RATE: 20 BRPM | DIASTOLIC BLOOD PRESSURE: 82 MMHG

## 2024-04-24 DIAGNOSIS — R21 FACIAL RASH: Primary | ICD-10-CM

## 2024-04-24 DIAGNOSIS — T78.40XA ALLERGIC REACTION: ICD-10-CM

## 2024-04-24 LAB
ALBUMIN SERPL BCP-MCNC: 3.3 G/DL (ref 3.5–5.2)
ALP SERPL-CCNC: 61 U/L (ref 55–135)
ALT SERPL W/O P-5'-P-CCNC: 37 U/L (ref 10–44)
ANION GAP SERPL CALC-SCNC: 13 MMOL/L (ref 8–16)
AST SERPL-CCNC: 39 U/L (ref 10–40)
BILIRUB SERPL-MCNC: 0.3 MG/DL (ref 0.1–1)
BILIRUB UR QL STRIP: NEGATIVE
BUN SERPL-MCNC: 6 MG/DL (ref 6–20)
CALCIUM SERPL-MCNC: 8.5 MG/DL (ref 8.7–10.5)
CHLORIDE SERPL-SCNC: 105 MMOL/L (ref 95–110)
CLARITY UR: CLEAR
CO2 SERPL-SCNC: 18 MMOL/L (ref 23–29)
COLOR UR: YELLOW
CREAT SERPL-MCNC: 0.7 MG/DL (ref 0.5–1.4)
ERYTHROCYTE [DISTWIDTH] IN BLOOD BY AUTOMATED COUNT: 13.3 % (ref 11.5–14.5)
EST. GFR  (NO RACE VARIABLE): >60 ML/MIN/1.73 M^2
GLUCOSE SERPL-MCNC: 164 MG/DL (ref 70–110)
GLUCOSE UR QL STRIP: NEGATIVE
HCT VFR BLD AUTO: 39.5 % (ref 37–48.5)
HGB BLD-MCNC: 13.3 G/DL (ref 12–16)
HGB UR QL STRIP: ABNORMAL
KETONES UR QL STRIP: NEGATIVE
LEUKOCYTE ESTERASE UR QL STRIP: NEGATIVE
MCH RBC QN AUTO: 27.9 PG (ref 27–31)
MCHC RBC AUTO-ENTMCNC: 33.7 G/DL (ref 32–36)
MCV RBC AUTO: 83 FL (ref 82–98)
NITRITE UR QL STRIP: NEGATIVE
OHS QRS DURATION: 70 MS
OHS QTC CALCULATION: 413 MS
PH UR STRIP: 6 [PH] (ref 5–8)
PLATELET # BLD AUTO: 222 K/UL (ref 150–450)
PMV BLD AUTO: 10 FL (ref 9.2–12.9)
POTASSIUM SERPL-SCNC: 4.1 MMOL/L (ref 3.5–5.1)
PROT SERPL-MCNC: 7 G/DL (ref 6–8.4)
PROT UR QL STRIP: NEGATIVE
RBC # BLD AUTO: 4.76 M/UL (ref 4–5.4)
SODIUM SERPL-SCNC: 136 MMOL/L (ref 136–145)
SP GR UR STRIP: 1.01 (ref 1–1.03)
URN SPEC COLLECT METH UR: ABNORMAL
UROBILINOGEN UR STRIP-ACNC: NEGATIVE EU/DL
WBC # BLD AUTO: 6.41 K/UL (ref 3.9–12.7)

## 2024-04-24 PROCEDURE — 99284 EMERGENCY DEPT VISIT MOD MDM: CPT | Mod: 25

## 2024-04-24 PROCEDURE — 63600175 PHARM REV CODE 636 W HCPCS: Performed by: EMERGENCY MEDICINE

## 2024-04-24 PROCEDURE — 93005 ELECTROCARDIOGRAM TRACING: CPT

## 2024-04-24 PROCEDURE — 85027 COMPLETE CBC AUTOMATED: CPT | Performed by: EMERGENCY MEDICINE

## 2024-04-24 PROCEDURE — 81003 URINALYSIS AUTO W/O SCOPE: CPT | Performed by: EMERGENCY MEDICINE

## 2024-04-24 PROCEDURE — 96375 TX/PRO/DX INJ NEW DRUG ADDON: CPT

## 2024-04-24 PROCEDURE — 93010 ELECTROCARDIOGRAM REPORT: CPT | Mod: ,,, | Performed by: INTERNAL MEDICINE

## 2024-04-24 PROCEDURE — 96374 THER/PROPH/DIAG INJ IV PUSH: CPT

## 2024-04-24 PROCEDURE — 25000003 PHARM REV CODE 250: Performed by: EMERGENCY MEDICINE

## 2024-04-24 PROCEDURE — 80053 COMPREHEN METABOLIC PANEL: CPT | Performed by: EMERGENCY MEDICINE

## 2024-04-24 PROCEDURE — 96361 HYDRATE IV INFUSION ADD-ON: CPT

## 2024-04-24 RX ORDER — FAMOTIDINE 10 MG/ML
20 INJECTION INTRAVENOUS 2 TIMES DAILY
Status: DISCONTINUED | OUTPATIENT
Start: 2024-04-24 | End: 2024-04-24

## 2024-04-24 RX ORDER — FAMOTIDINE 10 MG/ML
20 INJECTION INTRAVENOUS DAILY
Status: DISCONTINUED | OUTPATIENT
Start: 2024-04-24 | End: 2024-04-24 | Stop reason: HOSPADM

## 2024-04-24 RX ORDER — DIPHENHYDRAMINE HYDROCHLORIDE 50 MG/ML
25 INJECTION INTRAMUSCULAR; INTRAVENOUS
Status: COMPLETED | OUTPATIENT
Start: 2024-04-24 | End: 2024-04-24

## 2024-04-24 RX ORDER — PREDNISONE 20 MG/1
40 TABLET ORAL
Status: COMPLETED | OUTPATIENT
Start: 2024-04-24 | End: 2024-04-24

## 2024-04-24 RX ORDER — DIPHENHYDRAMINE HCL 25 MG
25 CAPSULE ORAL EVERY 6 HOURS PRN
Qty: 20 CAPSULE | Refills: 0 | Status: SHIPPED | OUTPATIENT
Start: 2024-04-24 | End: 2024-04-29

## 2024-04-24 RX ORDER — PREDNISONE 20 MG/1
20 TABLET ORAL 2 TIMES DAILY WITH MEALS
Qty: 10 TABLET | Refills: 0 | Status: SHIPPED | OUTPATIENT
Start: 2024-04-24 | End: 2024-04-29

## 2024-04-24 RX ORDER — FAMOTIDINE 20 MG/1
20 TABLET, FILM COATED ORAL 2 TIMES DAILY
Qty: 20 TABLET | Refills: 0 | Status: SHIPPED | OUTPATIENT
Start: 2024-04-24 | End: 2024-05-01

## 2024-04-24 RX ADMIN — FAMOTIDINE 20 MG: 10 INJECTION, SOLUTION INTRAVENOUS at 12:04

## 2024-04-24 RX ADMIN — PREDNISONE 40 MG: 20 TABLET ORAL at 12:04

## 2024-04-24 RX ADMIN — DIPHENHYDRAMINE HYDROCHLORIDE 25 MG: 50 INJECTION INTRAMUSCULAR; INTRAVENOUS at 12:04

## 2024-04-24 RX ADMIN — SODIUM CHLORIDE 1000 ML: 9 INJECTION, SOLUTION INTRAVENOUS at 12:04

## 2024-04-24 NOTE — DISCHARGE INSTRUCTIONS
Please stop taking Bactrim, azithromycin, and oxycodone. Follow up with the Allergy Clinic for further evaluation of your rash and allergic reaction.       Thank you for choosing [facility]!     Our goal in the Emergency Department is to always provide outstanding medical care. You may receive a survey by mail or e-mail in the next week regarding your experience today. We would greatly appreciate you completing and returning the survey. Your feedback provides us with a way to recognize our staff who provide very good care, and it helps us learn how to improve when your experience was below our aspiration of excellence.      It is important to remember that some problems are difficult to diagnose and may not be found during your first visit. Be sure to follow up with your primary care doctor and review any labs/imaging that was performed during your visit with them. If you do not have a primary care doctor, you may contact the one listed on your discharge paperwork, or you may also call the [facility] Appointment Desk at [phone] to schedule an appointment.     All medications may potentially have side effects and it is impossible to predict which medications may give you side effects. If you feel that you are having a negative effect of any medication you should immediately stop taking them and seek medical attention.  Do not drive or make any important decisions for 24 hours if you have received any pain medications, sedatives or mood altering drugs during your ER visit.    We appreciate you trusting us with your medical care. We will be happy to take care of you for all of your future medical needs. You may return to the ER at any time for any new/concerning symptoms, worsening condition, or failure to improve. We hope you feel better soon.     Sincerely,    Constantino Hughes Jr., MD  Board-Certified Emergency Medicine Physician  [facility]

## 2024-04-24 NOTE — ED NOTES
Two patient identifiers have been checked and are correct.      Appearance: Pt awake, alert & oriented to person, place & time. Pt in no acute distress at present time. Pt is clean and well groomed with clothes appropriately fastened.   Skin: Skin warm, dry & intact. Color consistent with ethnicity. Mucous membranes moist. No breakdown or brusing noted. Rash noted to chest, face is red.   Musculoskeletal: Patient moving all extremities well, no obvious swelling or deformities noted.   Respiratory: Respirations spontaneous, even, and non-labored. Visible chest rise noted. Airway is open and patent. No accessory muscle use noted. Pt has no difficulty breathing or swallowing.   Neurologic: Sensation is intact. Speech is clear and appropriate. Eyes open spontaneously, behavior appropriate to situation, follows commands, facial expression symmetrical, bilateral hand grasp equal and even, purposeful motor response noted.  Cardiac: All peripheral pulses present. No Bilateral lower extremity edema. Cap refill is <3 seconds.  Abdomen: Abdomen soft, non-tender to palpation.   : Pt reports no dysuria or hematuria.

## 2024-04-24 NOTE — ED PROVIDER NOTES
"Emergency Department Encounter  Provider Note    Sydnee Don  [id number]  4/24/2024    Evaluation:    History Acquisition:     Chief Complaint   Patient presents with    Rash    Allergic Reaction     Pt is currently on day 6 of sulfa antibiotic  for kidney infection, pt was seen at urgent care yesterday given a steroid shot, then took sulfa antibiotic again this am and complains of redness and rash to face, pt also reports " throat is tight" pt tolerating water in triage, pt last ate scrambled eggs 1 hour ago        History of Present Illness:  Sydnee Don is a 49 y.o. female who has a past medical history of Anxiety and depression (5/5/2021), Cervical radiculopathy, Iron deficiency anemia due to chronic blood loss (4/23/2018), Rosacea (5/5/2021), and Stress incontinence.    The patient presents to the ED due to concern for allergic reaction.   She reports recently being started on 2 ABX, as well as pain medication.  She reports breaking out in an all-over body rash yesterday. She was seen in UC and given steroid injection with resolution. She was told to stop taking the ABX and restart it this morning to see if her symptoms came back. After taking bactrim, she reports breaking out in a red, itchy facial rash. She denies any other rash to her body. No Spb but she does feel a tightness and scratchy sensation in her throat. No N/V, CP, abdominal pain, or any other concerns. No known allergies. She wants testing to know what she is allergic to.      Additional historians utilized:  none    Prior medical records were reviewed:   UC visit for diffuse rash. Given IM decadron.   Visit 4/22 for UTI and pneumonia. RX Bactrim, azithromycin, toradol, oxycodone.   UC visit 4/18 for dysuria.   Urine culture 4/18 with pan-sensitive E coli    The patient's list of active medical history, family/social history, medications, and allergies as documented has been reviewed.     Past Medical History:   Diagnosis Date    Anxiety and " depression 2021    Cervical radiculopathy     Iron deficiency anemia due to chronic blood loss 2018    Rosacea 2021    Stress incontinence      Past Surgical History:   Procedure Laterality Date     SECTION       Family History   Problem Relation Name Age of Onset    Hyperlipidemia Father      Hypertension Father      Hyperlipidemia Mother      Hypertension Mother      Arthritis Mother      Breast cancer Paternal Aunt       Social History     Socioeconomic History    Marital status:    Tobacco Use    Smoking status: Never     Passive exposure: Never    Smokeless tobacco: Never   Substance and Sexual Activity    Alcohol use: No    Drug use: No    Sexual activity: Yes     Partners: Male     Birth control/protection: OCP     Social Determinants of Health     Financial Resource Strain: Low Risk  (2022)    Received from [facility] [employer] [facility]    Overall Financial Resource Strain (CARDIA)     Difficulty of Paying Living Expenses: Not hard at all   Food Insecurity: Unknown (2022)    Received from [facility] [employer] [facility]    Hunger Vital Sign     Worried About Running Out of Food in the Last Year: Patient declined     Ran Out of Food in the Last Year: Patient declined   Transportation Needs: No Transportation Needs (2022)    Received from [facility] [employer] [facility] [employer]    PRAPARE - Transportation     Lack of Transportation (Medical): No     Lack of Transportation (Non-Medical): No   Physical Activity: Inactive (2022)    Received from [facility] [employer] [facility]    Exercise Vital Sign     Days of Exercise per Week: 0 days     Minutes of Exercise per Session: 0 min   Stress: No Stress Concern Present (2022)    Received from [facility] [employer] [facility]    [place] [employer] - Occupational Stress Questionnaire     Feeling of Stress : Only a little   Social Connections: Moderately Isolated (2022)    Received from [facility] [employer] [facility]    Social Connection  and Isolation Panel [NHANES]     Frequency of Communication with Friends and Family: Three times a week     Frequency of Social Gatherings with Friends and Family: Twice a week     Attends Worship Services: Never     Active Member of Clubs or Organizations: No     Attends Club or Organization Meetings: Never     Marital Status:    Housing Stability: Unknown (9/26/2022)    [facility]    Housing Stability Vital Sign     Unable to Pay for Housing in the Last Year: No     In the last 12 months, was there a time when you did not have a steady place to sleep or slept in a shelter (including now)?: No       Medications:  Medication List with Changes/Refills   New Medications    DIPHENHYDRAMINE (BENADRYL) 25 MG CAPSULE    Take 1 capsule (25 mg total) by mouth every 6 (six) hours as needed for Itching or Allergies.    FAMOTIDINE (PEPCID) 20 MG TABLET    Take 1 tablet (20 mg total) by mouth 2 (two) times daily. for 7 days    PREDNISONE (DELTASONE) 20 MG TABLET    Take 1 tablet (20 mg total) by mouth 2 (two) times daily with meals. for 5 days   Current Medications    ALBUTEROL (VENTOLIN HFA) 90 MCG/ACTUATION INHALER    Inhale 2 puffs into the lungs every 6 (six) hours as needed for Wheezing. Rescue    LORATADINE (CLARITIN) 10 MG TABLET    Take 1 tablet (10 mg total) by mouth once daily.    NORETHINDRONE-ETHINYL ESTRADIOL (BLISOVI FE 1/20, 28,) 1 MG-20 MCG (21)/75 MG (7) PER TABLET    Take 1 tablet by mouth once daily.    PHENAZOPYRIDINE (PYRIDIUM) 200 MG TABLET    Take 1 tablet (200 mg total) by mouth 3 (three) times daily as needed for Pain.   Discontinued Medications    AZITHROMYCIN (Z-NICOLAS) 250 MG TABLET    Take 1 tablet (250 mg total) by mouth once daily. Take first 2 tablets together, then 1 every day until finished.    HYDROCHLOROTHIAZIDE (HYDRODIURIL) 25 MG TABLET    Take 1 tablet (25 mg total) by mouth once daily.    IBUPROFEN (ADVIL,MOTRIN) 600 MG TABLET    Take 1 tablet (600 mg  total) by mouth every 6 (six) hours as needed for Pain.    KETOROLAC (TORADOL) 10 MG TABLET    Take 1 tablet (10 mg total) by mouth every 8 (eight) hours. for 5 days    OXYCODONE-ACETAMINOPHEN (PERCOCET) 5-325 MG PER TABLET    Take 1 tablet by mouth every 6 (six) hours as needed for Pain.    SULFAMETHOXAZOLE-TRIMETHOPRIM 800-160MG (BACTRIM DS) 800-160 MG TAB    Take 1 tablet by mouth 2 (two) times daily. for 7 days    SULFAMETHOXAZOLE-TRIMETHOPRIM 800-160MG (BACTRIM DS) 800-160 MG TAB    Take 1 tablet by mouth 2 (two) times daily. for 3 days       Allergies:  Review of patient's allergies indicates:  No Known Allergies    Review of Systems   HENT:  Positive for sore throat.    Respiratory:  Positive for shortness of breath.    Skin:  Positive for rash.         Physical Exam:     Initial Vitals [04/24/24 1154]   BP Pulse Resp Temp SpO2   139/83 (!) 111 20 98.5 °F (36.9 °C) 97 %      MAP       --         Physical Exam    Nursing note and vitals reviewed.  Constitutional: She appears well-developed and well-nourished. She is not diaphoretic. No distress.   HENT:   Head: Normocephalic and atraumatic.   Mouth/Throat: Oropharynx is clear and moist.   Normal phonation, no stridor.    Eyes: EOM are normal. Pupils are equal, round, and reactive to light.   Neck: No tracheal deviation present.   Cardiovascular:  Normal rate, regular rhythm, normal heart sounds and intact distal pulses.           Pulmonary/Chest: Breath sounds normal. No stridor. No respiratory distress. She has no wheezes.   Abdominal: Abdomen is soft. Bowel sounds are normal. She exhibits no distension and no mass. There is no abdominal tenderness.   Musculoskeletal:         General: No edema. Normal range of motion.     Neurological: She is alert and oriented to person, place, and time. She has normal strength. No cranial nerve deficit or sensory deficit.   Skin: Skin is warm and dry. Capillary refill takes less than 2 seconds. Rash noted. No pallor.    Erythematous rash to face. No hives/urticaria to torso or extremities.    Psychiatric: She has a normal mood and affect. Her behavior is normal. Thought content normal.         Differential Diagnoses:   Based on available information and initial assessment, Differential Diagnosis includes, but is not limited to:  Necrotizing fasciitis, erythema multiforme, Mtz-Michael syndrome, toxic epidermal necrolysis, DIC, cellulitis, Staph scalded skin syndrome, toxic shock syndrome, secondary syphilis, abscess, osteomyelitis, septic joint, MRSA, DVT, superficial thrombophlebitis, varicose vein, drug eruption, allergic reaction/urticatia, irritant/contact dermatitis, viral exanthem, local trauma/contusion, abrasion.      ED Management:   Procedures    Orders Placed This Encounter    CBC Without Differential    Comprehensive metabolic panel    Urinalysis, Reflex to Urine Culture Urine, Clean Catch    Ambulatory referral/consult to Allergy    EKG 12-lead    Insert peripheral IV    sodium chloride 0.9% bolus 1,000 mL 1,000 mL    predniSONE tablet 40 mg    diphenhydrAMINE injection 25 mg    famotidine (PF) injection 20 mg    famotidine (PEPCID) 20 MG tablet    diphenhydrAMINE (BENADRYL) 25 mg capsule    predniSONE (DELTASONE) 20 MG tablet          EKG:   EKG interpretation by ED attending physician:  NSR, rate 87, no ST changes, no ischemia, normal intervals.  No prior for comparison.       Labs:     Labs Reviewed   COMPREHENSIVE METABOLIC PANEL - Abnormal; Notable for the following components:       Result Value    CO2 18 (*)     Glucose 164 (*)     Calcium 8.5 (*)     Albumin 3.3 (*)     All other components within normal limits   URINALYSIS, REFLEX TO URINE CULTURE - Abnormal; Notable for the following components:    Occult Blood UA Trace (*)     All other components within normal limits    Narrative:     Specimen Source->Urine   CBC WITHOUT DIFFERENTIAL     Independent review of the labs ordered include:   See ED  course    Imaging:     Imaging Results    None            Medications Given:     Medications   famotidine (PF) injection 20 mg (20 mg Intravenous Given 4/24/24 1251)   sodium chloride 0.9% bolus 1,000 mL 1,000 mL (0 mLs Intravenous Stopped 4/24/24 1411)   predniSONE tablet 40 mg (40 mg Oral Given 4/24/24 1239)   diphenhydrAMINE injection 25 mg (25 mg Intravenous Given 4/24/24 1239)        Medical Decision Making:    Additional Consideration:   Additional testing considered during clinical course: considered IM epi for possible allergic reaction but no signs of anaphylaxis or airway involvement, clinically improved with supportive care    Social determinants of health considered during development of treatment plan include: poor access to care    Current co-morbidities considered which impacted clinical decision making: anxiety, depression, rosacea, obesity    Case discussed with additional provider: none    ED Course as of 04/24/24 1438   Wed Apr 24, 2024   1341 SpO2: 97 % [SS]   1341 Resp: 20 [SS]   1341 Pulse(!): 111 [SS]   1341 Temp Source: Oral [SS]   1341 Temp: 98.5 °F (36.9 °C) [SS]   1341 BP: 139/83  Mildly tachycardic, other vitals reassuring [SS]   1341 Comprehensive metabolic panel(!)  Glucose mildly elevated, otherwise reassuring  [SS]   1341 CBC Without Differential  WNL [SS]   1342 Urinalysis, Reflex to Urine Culture Urine, Clean Catch(!)  UA unremarkable  [SS]   1413 Pulse: 90  HR improved on reassessment  [SS]      ED Course User Index  [SS] Constantino Hughes MD            Medical Decision Making  48 yo F with facial rash after taking Bactrim.  Vitals and exam benign. No wheezing or respiratory distress. Labs reassuring. UA without infection.   Patient feeling better after IVF, steroids, Pepcid, benadryl.  Unknown etiology of rash. Instructed to stop all ABX and pain medication as these are new medications since recent ED visit.  Referral placed for Allergy Clinic. Stable for D/C, return precautions  given.     Problems Addressed:  Allergic reaction: acute illness or injury  Facial rash: acute illness or injury    Amount and/or Complexity of Data Reviewed  External Data Reviewed: labs and notes.  Labs: ordered. Decision-making details documented in ED Course.  ECG/medicine tests: ordered and independent interpretation performed. Decision-making details documented in ED Course.    Risk  Prescription drug management.  Diagnosis or treatment significantly limited by social determinants of health.        Clinical Impression:       ICD-10-CM ICD-9-CM   1. Facial rash  R21 782.1   2. Allergic reaction  T78.40XA 995.3       Discharge Medications:  Current Discharge Medication List        START taking these medications    Details   diphenhydrAMINE (BENADRYL) 25 mg capsule Take 1 capsule (25 mg total) by mouth every 6 (six) hours as needed for Itching or Allergies.  Qty: 20 capsule, Refills: 0      famotidine (PEPCID) 20 MG tablet Take 1 tablet (20 mg total) by mouth 2 (two) times daily. for 7 days  Qty: 20 tablet, Refills: 0      predniSONE (DELTASONE) 20 MG tablet Take 1 tablet (20 mg total) by mouth 2 (two) times daily with meals. for 5 days  Qty: 10 tablet, Refills: 0               Follow-up Information       Follow up With Specialties Details Why Contact Info Additional Information    Ru Aguila MD Family Medicine Schedule an appointment as soon as possible for a visit   [address]  [address] [address] [phone]       [facility] Allergy Allergy Schedule an appointment as soon as possible for a visit   [address] [facility]  [facility] [id number] [phone] Please park in garage and take elevator to the 8th floor             ED Disposition Condition    Discharge Stable              On re-evaluation, the patient's status has improved.  PCP/Allergy clinic follow-up as soon as possible was recommended.    After taking into careful account the patient's history, physical  exam findings, as well as empirical and objective data obtained throughout ED workup, I feel no emergent medical condition has been identified. No further evaluation or admission was felt to be required, and the patient is stable for discharge from the ED. The patient and any additional family present were updated with test results, overall clinical impression, and recommended further plan of care, including discharge instructions as provided and outpatient follow-up for continued evaluation and management as needed. All questions were answered. The patient expressed understanding and agreed with current plan for discharge and follow-up plan of care. Strict ED return precautions were provided, including return/worsening of current symptoms, new symptoms, or any other concerns.       Constantino Hughes MD  04/24/24 9329

## 2024-04-24 NOTE — FIRST PROVIDER EVALUATION
" Emergency Department TeleTriage Encounter Note      CHIEF COMPLAINT    Chief Complaint   Patient presents with    Rash    Allergic Reaction     Pt is currently on day 6 of sulfa antibiotic  for kidney infection, pt was seen at urgent care yesterday given a steroid shot, then took sulfa antibiotic again this am and complains of redness and rash to face, pt also reports " throat is tight" pt tolerating water in triage, pt last ate scrambled eggs 1 hour ago        VITAL SIGNS   Initial Vitals [04/24/24 1154]   BP Pulse Resp Temp SpO2   139/83 (!) 111 20 98.5 °F (36.9 °C) 97 %      MAP       --            ALLERGIES    Review of patient's allergies indicates:  No Known Allergies    PROVIDER TRIAGE NOTE  TeleTriage Note: Sydnee Don, a nontoxic/well appearing, 49 y.o. female, presented to the ED with c/o burning to her face and her throat feels tight. Was taken bactrim for UTI and thinks it is from that. Seen at  yesterday for same symptoms. Denies chest pain or SOB. Pt is speaking in complete sentences without respiratory distress.     All ED beds are full at present; patient notified of this status.  Patient seen and medically screened by Nurse Practitioner via teletriage. Orders initiated at triage to expedite care.  Patient is stable to return to the waiting room and will be placed in an ED bed when available.  Care will be transferred to an alternate provider when patient has been placed in an Exam Room from the [facility] for physical exam, additional orders, and disposition.  12:01 PM Lucía Starks DNP, FNP-C        ORDERS  Labs Reviewed - No data to display    ED Orders (720h ago, onward)      None              Virtual Visit Note: The provider triage portion of this emergency department evaluation and documentation was performed via [employer], a HIPAA-compliant telemedicine application, in concert with a tele-presenter in the room. A face to face patient evaluation with one of my colleagues will occur once " the patient is placed in an emergency department room.      DISCLAIMER: This note was prepared with [employer] voice recognition transcription software. Garbled syntax, mangled pronouns, and other bizarre constructions may be attributed to that software system.

## 2024-04-29 ENCOUNTER — TELEPHONE (OUTPATIENT)
Dept: ALLERGY | Facility: CLINIC | Age: 49
End: 2024-04-29
Payer: MEDICAID

## 2024-04-29 NOTE — TELEPHONE ENCOUNTER
----- Message from Lucía Asher MA sent at 4/26/2024  4:04 PM CDT -----  Regarding: FW: sooner appt request    ----- Message -----  From: Lance Tolbert  Sent: 4/26/2024   3:28 PM CDT  To: [facility] Clinical Support Triage  Subject: sooner appt request                              PATIENT CALL    Pt called regarding upcoming appt w/ Dr Vu. States that her symptoms have been worsening and would like to be seen sooner. Please call back at [phone]

## 2024-05-01 ENCOUNTER — OFFICE VISIT (OUTPATIENT)
Dept: FAMILY MEDICINE | Facility: HOSPITAL | Age: 49
End: 2024-05-01
Payer: MEDICAID

## 2024-05-01 VITALS
WEIGHT: 256.38 LBS | SYSTOLIC BLOOD PRESSURE: 122 MMHG | BODY MASS INDEX: 57.67 KG/M2 | HEART RATE: 86 BPM | OXYGEN SATURATION: 97 % | DIASTOLIC BLOOD PRESSURE: 79 MMHG | HEIGHT: 56 IN | RESPIRATION RATE: 18 BRPM

## 2024-05-01 DIAGNOSIS — E66.9 OBESITY, UNSPECIFIED CLASSIFICATION, UNSPECIFIED OBESITY TYPE, UNSPECIFIED WHETHER SERIOUS COMORBIDITY PRESENT: ICD-10-CM

## 2024-05-01 DIAGNOSIS — E78.5 HYPERLIPIDEMIA, UNSPECIFIED HYPERLIPIDEMIA TYPE: ICD-10-CM

## 2024-05-01 DIAGNOSIS — Z87.01 HISTORY OF PNEUMONIA: Primary | ICD-10-CM

## 2024-05-01 PROCEDURE — 99214 OFFICE O/P EST MOD 30 MIN: CPT

## 2024-05-01 RX ORDER — ATORVASTATIN CALCIUM 10 MG/1
10 TABLET, FILM COATED ORAL DAILY
Qty: 90 TABLET | Refills: 3 | Status: CANCELLED | OUTPATIENT
Start: 2024-05-01 | End: 2025-05-01

## 2024-05-01 NOTE — PROGRESS NOTES
[facility] Family [facility]    Subjective:     Sydnee Don is a 49 y.o. year old female with PMHx of obesity, HTN, HLD who presents to clinic for ED follow up.     #Allergic reaction  #UTI  #Pneumonia  Approximately 2 weeks ago patient had dysuria and went to an urgent care and was given bactrim for a UTI. Several days later on 4/22/24 she returned to ED due to intermittent fevers, and abdominal pain which prompted a CT abdomen which was negative and incidentally found some lung findings suggestive of CAP. She was discharged on azithromycin from that ED visit. Two days later on 4/24/24 patient had a full body rash that she went to emergency room. She was given antihistamine and steroid injection that alleviated her symptoms and she was discontinued on all of her recently prescribed medications of azithromycin, bactrim, and oxycodone, ketorolac, and HCTZ due to obscured picture of which medication induced her allergic reaction. Patient is currently not on antibiotics, she denies any dysuria symptoms and had a negative UA on ED visit. She is still endorsing mild shortness of breath, fatigue, and dizziness but no fevers or chills. Denies nausea, vomiting, chest pain. Patient has had 6 days of bactrim use and 2 days of azithromycin use before discontinuation, likely clearance of UTI and possible clearance of CAP, will reassess at next visit for resolution of symptoms.      Patient Active Problem List    Diagnosis Date Noted    Acute left-sided low back pain without sciatica 02/08/2024    Hip pain, left 02/08/2024    Borderline Hypercholesterolemia 01/17/2024    Elevated BP without diagnosis of hypertension 01/17/2024    Class 3 severe obesity with body mass index (BMI) of 50.0 to 59.9 in adult 01/17/2024    Other spondylosis with radiculopathy, lumbar region 01/17/2024    Rosacea 05/05/2021    Anxiety and depression 05/05/2021        Review of patient's allergies indicates:   Allergen Reactions    Bactrim  "[sulfamethoxazole-trimethoprim] Rash     Possible bactrim rash, picture obscured by several overlapping antibiotics at time of rash        Past Medical History:   Diagnosis Date    Anxiety and depression 2021    Cervical radiculopathy     Iron deficiency anemia due to chronic blood loss 2018    Rosacea 2021    Stress incontinence       Past Surgical History:   Procedure Laterality Date     SECTION        Family History   Problem Relation Name Age of Onset    Hyperlipidemia Father      Hypertension Father      Hyperlipidemia Mother      Hypertension Mother      Arthritis Mother      Breast cancer Paternal Aunt        Social History     Tobacco Use    Smoking status: Never     Passive exposure: Never    Smokeless tobacco: Never   Substance Use Topics    Alcohol use: No        Objective:     Vitals:    24 1604   BP: 122/79   BP Location: Left forearm   Patient Position: Sitting   BP Method: Small (Automatic)   Pulse: 86   Resp: 18   SpO2: 97%   Weight: 116.3 kg (256 lb 6.3 oz)   Height: 4' 8" (1.422 m)     BMI: Body mass index is 57.48 kg/m².    Physical Exam  Vitals reviewed.   Constitutional:       General: She is not in acute distress.     Appearance: Normal appearance. She is not ill-appearing, toxic-appearing or diaphoretic.   HENT:      Head: Normocephalic and atraumatic.      Right Ear: External ear normal.      Left Ear: External ear normal.   Eyes:      General: No scleral icterus.     Extraocular Movements: Extraocular movements intact.      Pupils: Pupils are equal, round, and reactive to light.   Neck:      Vascular: No carotid bruit.   Cardiovascular:      Rate and Rhythm: Normal rate and regular rhythm.      Pulses: Normal pulses.      Heart sounds: Normal heart sounds. No murmur heard.     No friction rub. No gallop.   Pulmonary:      Effort: Pulmonary effort is normal. No respiratory distress.      Breath sounds: Normal breath sounds. No stridor. No wheezing or rhonchi. "   Abdominal:      General: Abdomen is flat. There is no distension.      Palpations: Abdomen is soft.      Tenderness: There is no abdominal tenderness. There is no guarding.   Musculoskeletal:      Cervical back: Normal range of motion.   Skin:     General: Skin is warm and dry.      Coloration: Skin is not jaundiced.      Findings: No bruising or rash.   Neurological:      General: No focal deficit present.      Mental Status: She is alert and oriented to person, place, and time. Mental status is at baseline.      Comments: Negative phalen sign negative tinel sign   Psychiatric:         Mood and Affect: Mood normal.         Assessment/Plan:     Sydnee Don is a 49 y.o. year old female PMHx of obesity, HTN, HLD who presents to clinic for ED follow up for allergic reaction.     1. History of pneumonia  Patient recently prescribed azithro due to CT findings of CAP, had 2 days of treatment. Still feeling fatigued but could be post infectious, viral, but not endorsing any fevers since discontinuation of antibiotics.  - X-Ray Chest PA And Lateral; Future    2. Hyperlipidemia, unspecified hyperlipidemia type  - Hemoglobin A1C; Future  - LIPID PANEL; Future    3. Obesity, unspecified classification, unspecified obesity type, unspecified whether serious comorbidity present  - Hemoglobin A1C; Future  - LIPID PANEL; Future      Follow-up:2 weeks, f/u respiratory symptoms    A total of 30 minutes was spent on patient care during this encounter which included chart review, examining the patient, formulating a treatment plan and documentation.      Medical decision making straight forward and not complex during this visit.     Case discussed with staff: Dr. Joseluis Aguila MD  [facility] Family [facility], PGY-1  05/17/2024

## 2024-05-02 ENCOUNTER — HOSPITAL ENCOUNTER (OUTPATIENT)
Dept: RADIOLOGY | Facility: HOSPITAL | Age: 49
Discharge: HOME | End: 2024-05-02
Payer: MEDICAID

## 2024-05-02 DIAGNOSIS — Z87.01 HISTORY OF PNEUMONIA: ICD-10-CM

## 2024-05-02 PROCEDURE — 71046 X-RAY EXAM CHEST 2 VIEWS: CPT | Mod: 26,,, | Performed by: INTERNAL MEDICINE

## 2024-05-02 PROCEDURE — 71046 X-RAY EXAM CHEST 2 VIEWS: CPT | Mod: TC,FY

## 2024-05-17 ENCOUNTER — OFFICE VISIT (OUTPATIENT)
Dept: FAMILY MEDICINE | Facility: HOSPITAL | Age: 49
End: 2024-05-17
Payer: MEDICAID

## 2024-05-17 VITALS
HEART RATE: 90 BPM | HEIGHT: 56 IN | WEIGHT: 256.38 LBS | SYSTOLIC BLOOD PRESSURE: 137 MMHG | BODY MASS INDEX: 57.67 KG/M2 | DIASTOLIC BLOOD PRESSURE: 85 MMHG

## 2024-05-17 DIAGNOSIS — Z87.01 HISTORY OF PNEUMONIA: Primary | ICD-10-CM

## 2024-05-17 DIAGNOSIS — E78.5 HYPERLIPIDEMIA, UNSPECIFIED HYPERLIPIDEMIA TYPE: ICD-10-CM

## 2024-05-17 DIAGNOSIS — R03.0 ELEVATED BLOOD PRESSURE READING: ICD-10-CM

## 2024-05-17 PROCEDURE — 99213 OFFICE O/P EST LOW 20 MIN: CPT

## 2024-05-17 NOTE — PROGRESS NOTES
[facility]    Subjective:     Sydnee Don is a 49 y.o. year old female with PMHx of obesity, HTN, HLD who presents to clinic for follow up.    #Allergic reaction  #UTI  #Pneumonia  Approximately 4 weeks ago patient had dysuria and went to an urgent care and was given bactrim for a UTI. Several days later on 4/22/24 she returned to ED due to intermittent fevers, and abdominal pain which prompted a CT abdomen which was negative and incidentally found some lung findings suggestive of CAP. She was discharged on azithromycin from that ED visit. Two days later on 4/24/24 patient had a full body rash that she went to emergency room. She was given antihistamine and steroid injection that alleviated her symptoms and she was discontinued on all of her recently prescribed medications of azithromycin, bactrim, and oxycodone, ketorolac, and HCTZ due to obscured picture of which medication induced her allergic reaction. At last visit she denied any dysuria symptoms and had a negative UA on ED visit. She was previously endorsing mild shortness of breath, fatigue, and dizziness but no fevers or chills. In total patient has had 6 days of bactrim use and 2 days of azithromycin use before discontinuation, likely clearance of UTI and possible clearance of CAP.     At today's visit patient reports feeling much better with no acute complaints. She states that her breathing has improved back to baseline, she denies any dysuria or other UTI symptoms.     #elevated blood pressure reading  Blood pressure 137/85 today, not currently on any blood pressure medications. Blood pressure last visit was 122/79. Will continue to monitor    #last colon cancer screening  Last cologuard 2023, will need one in 2026    Patient Active Problem List    Diagnosis Date Noted    Acute left-sided low back pain without sciatica 02/08/2024    Hip pain, left 02/08/2024    Borderline Hypercholesterolemia 01/17/2024    Elevated BP without diagnosis of  "hypertension 2024    Class 3 severe obesity with body mass index (BMI) of 50.0 to 59.9 in adult 2024    Other spondylosis with radiculopathy, lumbar region 2024    Rosacea 2021    Anxiety and depression 2021        Review of patient's allergies indicates:   Allergen Reactions    Bactrim [sulfamethoxazole-trimethoprim] Rash     Possible bactrim rash, picture obscured by several overlapping antibiotics at time of rash        Past Medical History:   Diagnosis Date    Anxiety and depression 2021    Cervical radiculopathy     Iron deficiency anemia due to chronic blood loss 2018    Rosacea 2021    Stress incontinence       Past Surgical History:   Procedure Laterality Date     SECTION        Family History   Problem Relation Name Age of Onset    Hyperlipidemia Father      Hypertension Father      Hyperlipidemia Mother      Hypertension Mother      Arthritis Mother      Breast cancer Paternal Aunt        Social History     Tobacco Use    Smoking status: Never     Passive exposure: Never    Smokeless tobacco: Never   Substance Use Topics    Alcohol use: No        Objective:     Vitals:    24 1110   BP: 137/85   Pulse: 90   Weight: 116.3 kg (256 lb 6.3 oz)   Height: 4' 8" (1.422 m)     BMI: Body mass index is 57.48 kg/m².    Physical Exam  Vitals reviewed.   Constitutional:       General: She is not in acute distress.     Appearance: Normal appearance. She is not ill-appearing, toxic-appearing or diaphoretic.   HENT:      Head: Normocephalic and atraumatic.      Right Ear: External ear normal.      Left Ear: External ear normal.      Nose: No congestion or rhinorrhea.      Mouth/Throat:      Mouth: Mucous membranes are moist.      Pharynx: Oropharynx is clear. No oropharyngeal exudate or posterior oropharyngeal erythema.   Eyes:      General: No scleral icterus.     Extraocular Movements: Extraocular movements intact.      Pupils: Pupils are equal, round, and reactive " to light.   Neck:      Vascular: No carotid bruit.   Cardiovascular:      Rate and Rhythm: Normal rate and regular rhythm.      Pulses: Normal pulses.      Heart sounds: Normal heart sounds. No murmur heard.     No friction rub. No gallop.   Pulmonary:      Effort: Pulmonary effort is normal. No respiratory distress.      Breath sounds: Normal breath sounds. No stridor. No wheezing or rhonchi.   Abdominal:      General: Abdomen is flat. There is no distension.      Palpations: Abdomen is soft.      Tenderness: There is no abdominal tenderness. There is no right CVA tenderness, left CVA tenderness or guarding.   Musculoskeletal:      Cervical back: Normal range of motion.   Skin:     General: Skin is warm and dry.   Neurological:      General: No focal deficit present.      Mental Status: She is alert and oriented to person, place, and time. Mental status is at baseline.   Psychiatric:         Mood and Affect: Mood normal.         Behavior: Behavior normal.           Assessment/Plan:     Sydnee Don is a 49 y.o. year old female PMHx of obesity, HTN, HLD who presents to clinic for follow up.    1. History of pneumonia  Recent history of shortness of breath and fatigue with diagnosed pneumonia and short course of antibiotics. Today she reports resolution of symptoms without fevers, chills, SOB. Previous CT abdomen/pelvis that showed incidental pneumonia recommended repeat CT to assess resolution of symptoms.   - CT Chest Without Contrast; Future    2. Elevated blood pressure reading  Blood prssure 137/85 today, was 122/79 at previous visit. Will assess at next visit. Could be acute elevation in BP    3. Hyperlipidemia, unspecified hyperlipidemia type  History of hyperlipidemia, elevated cholesterol, low HDL, and elevated LDL. Not currently recommended for statin based on ASCVD score, encouraged diet and exercise.     ASCVD (Atherosclerotic Cardiovascular Disease) 2013 Risk Calculator from AHA/ACC from [employer]   on 6/4/2024  ** All calculations should be rechecked by clinician prior to use **    RESULT SUMMARY:     No statin recommended because 10-year risk <5%; always encourage healthy cardiovascular lifestyle choices. Some patients with other high risk features may still be appropriate for treatment.    2.1%    Risk of cardiovascular event (coronary or stroke death or non-fatal MI or stroke) in next 10 years.    0.8%    10-year cardiovascular risk if risk factors were optimal.      INPUTS:  Age --> 49 years  Diabetes --> 0 = No  Sex --> 0 = Female  Smoker --> 0 = No  Total cholesterol --> 222 mg/dL  HDL cholesterol --> 43 mg/dL  Systolic blood pressure --> 137 mm Hg  Treatment for hypertension --> 0 = No  Race --> 3 = Other        Follow-up:3 months    A total of 20 minutes was spent on patient care during this encounter which included chart review, examining the patient, formulating a treatment plan and documentation.        Medical decision making straight forward and not complex during this visit.     Case discussed with staff: Dr. Keeley Aguila MD  [facility], PGY-1  06/04/2024

## 2024-05-19 NOTE — PROGRESS NOTES
I assume primary medical responsibility for this patient. I have reviewed the history, physical, and assessement & treatment plan with the resident and agree that the care is reasonable and necessary. This service has been performed by a resident without the presence of a teaching physician under the primary care exception. If necessary, an addendum of additional findings or evaluation beyond the resident documentation will be noted below.    Agree with conservative treatment and anticipatory guidance in normal physical exam with close follow up.

## 2024-05-22 ENCOUNTER — TELEPHONE (OUTPATIENT)
Dept: FAMILY MEDICINE | Facility: HOSPITAL | Age: 49
End: 2024-05-22
Payer: MEDICAID

## 2024-05-29 ENCOUNTER — HOSPITAL ENCOUNTER (OUTPATIENT)
Dept: RADIOLOGY | Facility: HOSPITAL | Age: 49
Discharge: HOME | End: 2024-05-29
Payer: MEDICAID

## 2024-05-29 DIAGNOSIS — Z87.01 HISTORY OF PNEUMONIA: ICD-10-CM

## 2024-05-29 PROCEDURE — 71250 CT THORAX DX C-: CPT | Mod: 26,,, | Performed by: RADIOLOGY

## 2024-05-29 PROCEDURE — 71250 CT THORAX DX C-: CPT | Mod: TC

## 2024-06-05 NOTE — PROGRESS NOTES
I assume primary medical responsibility for this patient. I have reviewed the history, physical, and assessement & treatment plan with the resident and agree that the care is reasonable and necessary. This service has been performed by a resident with the presence of a teaching physician for the key parts of the history/exam. If necessary, an addendum of additional findings or evaluation beyond the resident documentation will be noted below.     Nicole Mina MD

## 2024-06-11 ENCOUNTER — OFFICE VISIT (OUTPATIENT)
Dept: ALLERGY | Facility: CLINIC | Age: 49
End: 2024-06-11
Payer: MEDICAID

## 2024-06-11 VITALS — BODY MASS INDEX: 57.67 KG/M2 | HEIGHT: 56 IN | WEIGHT: 256.38 LBS

## 2024-06-11 DIAGNOSIS — L27.0 DRUG ERUPTION: ICD-10-CM

## 2024-06-11 DIAGNOSIS — R21 GENERALIZED MACULOPAPULAR RASH: ICD-10-CM

## 2024-06-11 PROCEDURE — 99213 OFFICE O/P EST LOW 20 MIN: CPT | Mod: PBBFAC | Performed by: STUDENT IN AN ORGANIZED HEALTH CARE EDUCATION/TRAINING PROGRAM

## 2024-06-11 PROCEDURE — 3044F HG A1C LEVEL LT 7.0%: CPT | Mod: CPTII,,, | Performed by: STUDENT IN AN ORGANIZED HEALTH CARE EDUCATION/TRAINING PROGRAM

## 2024-06-11 PROCEDURE — 3008F BODY MASS INDEX DOCD: CPT | Mod: CPTII,,, | Performed by: STUDENT IN AN ORGANIZED HEALTH CARE EDUCATION/TRAINING PROGRAM

## 2024-06-11 PROCEDURE — 99999 PR PBB SHADOW E&M-EST. PATIENT-LVL III: CPT | Mod: PBBFAC,,, | Performed by: STUDENT IN AN ORGANIZED HEALTH CARE EDUCATION/TRAINING PROGRAM

## 2024-06-11 PROCEDURE — 99204 OFFICE O/P NEW MOD 45 MIN: CPT | Mod: S$PBB,,, | Performed by: STUDENT IN AN ORGANIZED HEALTH CARE EDUCATION/TRAINING PROGRAM

## 2024-06-11 PROCEDURE — 1159F MED LIST DOCD IN RCRD: CPT | Mod: CPTII,,, | Performed by: STUDENT IN AN ORGANIZED HEALTH CARE EDUCATION/TRAINING PROGRAM

## 2024-06-11 NOTE — PROGRESS NOTES
ALLERGY & IMMUNOLOGY CLINIC - INITIAL CONSULTATION      HISTORY OF PRESENT ILLNESS     Patient ID: Sydnee Don is a 49 y.o. female    Referral from: MD Saul  CC: concern for allergy    HPI: Sydnee Don is a 49 y.o. female who presents as new patient as referral from ER with concern for possible allergic reaction.    Reaction was two month ago. Started with urinary symptoms, fever and cough went to urgent care and was rx'ed Bactrim. Cough worsened to point of pain so went to ED next day. Had imaging consistent with pneumonia so was rx'ed azithromycin and oxycodone for pain. Went home that night and took all rx'ed medications in addition to corn husk tea. Later that night broke out into rash around 1 AM. Rash spread across her face and arms. Described as scatter red bumps that were warm and painful. No pruritus. Rash spread so went back to UC the next afternoon and received IM dexamethasone. Felt mild improvement but rash continued so went back to ED the next day. In ED she was rx'ed prednisone, benadryl and famotidine and told to stop all new medications. Rash resolved 3-4 days later. Estimates rash lasted about 7 days in total. No angioedema, syncope, vomiting, diarrhea or new respiratory symptoms in context of rash. No mucus membrane involvement. No ulcerations. No vesiculations. No blistering. No scarring. Has never happened before.    Food Allergy: denies  Venom Allergy: denies  Latex Allergy: denies  Drug Allergies:   Review of patient's allergies indicates:   Allergen Reactions    Bactrim [sulfamethoxazole-trimethoprim] Rash     Possible bactrim rash, picture obscured by several overlapping antibiotics at time of rash      Infection Hx: denies frequent or severe infections  Vaccines: UTD     FamHx: No drug or food allergies     MEDICAL HISTORY     MedHx:   Patient Active Problem List   Diagnosis    Rosacea    Anxiety and depression    Borderline Hypercholesterolemia    Elevated BP without diagnosis of  "hypertension    Class 3 severe obesity with body mass index (BMI) of 50.0 to 59.9 in adult    Other spondylosis with radiculopathy, lumbar region    Acute left-sided low back pain without sciatica    Hip pain, left       Medications:   Current Outpatient Medications on File Prior to Visit   Medication Sig Dispense Refill    norethindrone-ethinyl estradiol (BLISOVI FE , ,) 1 mg-20 mcg (21)/75 mg (7) per tablet Take 1 tablet by mouth once daily. 84 tablet 4    albuterol (VENTOLIN HFA) 90 mcg/actuation inhaler Inhale 2 puffs into the lungs every 6 (six) hours as needed for Wheezing. Rescue (Patient not taking: Reported on 2024) 18 g 3    famotidine (PEPCID) 20 MG tablet Take 1 tablet (20 mg total) by mouth 2 (two) times daily. for 7 days (Patient not taking: Reported on 2024) 20 tablet 0    loratadine (CLARITIN) 10 mg tablet Take 1 tablet (10 mg total) by mouth once daily. (Patient not taking: Reported on 2024) 30 tablet 2    phenazopyridine (PYRIDIUM) 200 MG tablet Take 1 tablet (200 mg total) by mouth 3 (three) times daily as needed for Pain. (Patient not taking: Reported on 2024) 15 tablet 0     No current facility-administered medications on file prior to visit.       SurgHx:  Past Surgical History:   Procedure Laterality Date     SECTION          PHYSICAL EXAM     VS:  4' 7.98" (1.422 m)   Wt 116.3 kg (256 lb 6.3 oz)   BMI 57.52 kg/m²   GENERAL: NAD, well-appearing, cooperative  ORAL: MMM, no ulcers, no thrush, no cobblestoning  LUNGS: no increased WOB  EXTREMITIES: No edema, no cyanosis, no clubbing  DERM: no rashes, no skin breaks, no dystrophic fingernails       ASSESSMENT & PLAN     Sydnee Don is a 49 y.o. female with     Morbilliform Drug Eruption  - Suspect culprit drug causing reaction is Bactrim. No need to avoid other drugs given history  - Discussed this is not an IgE-mediated allergy and is often self-limited and largely benign  - No further testing is indicated -> " okay to continue to avoid bactrim given multitude of suitable alternatives  - If situation arises where Bactrim is primary indicated option will plan for 2 step challenge (1 step if >5 years from now)      Discussed with: Dr. Cole  Follow up: VIKY Vu MD  [facility] Fellow

## 2024-09-23 RX ORDER — NORETHINDRONE ACETATE AND ETHINYL ESTRADIOL 1MG-20(21)
1 KIT ORAL DAILY
Qty: 84 TABLET | Refills: 0 | Status: SHIPPED | OUTPATIENT
Start: 2024-09-23

## 2024-09-23 NOTE — TELEPHONE ENCOUNTER
Refill Decision Note   Sydnee Don  is requesting a refill authorization.  Brief Assessment and Rationale for Refill:  Approve     Medication Therapy Plan:        Comments:     Note composed:2:13 PM 09/23/2024

## 2024-11-01 ENCOUNTER — PATIENT MESSAGE (OUTPATIENT)
Dept: FAMILY MEDICINE | Facility: HOSPITAL | Age: 49
End: 2024-11-01
Payer: MEDICAID

## 2024-11-08 DIAGNOSIS — Z12.31 BREAST CANCER SCREENING BY MAMMOGRAM: Primary | ICD-10-CM

## 2024-11-18 ENCOUNTER — HOSPITAL ENCOUNTER (OUTPATIENT)
Dept: RADIOLOGY | Facility: HOSPITAL | Age: 49
Discharge: HOME | End: 2024-11-18
Payer: MEDICAID

## 2024-11-18 DIAGNOSIS — Z12.31 BREAST CANCER SCREENING BY MAMMOGRAM: ICD-10-CM

## 2024-11-18 PROCEDURE — 77063 BREAST TOMOSYNTHESIS BI: CPT | Mod: 26,,, | Performed by: RADIOLOGY

## 2024-11-18 PROCEDURE — 77067 SCR MAMMO BI INCL CAD: CPT | Mod: TC

## 2024-11-18 PROCEDURE — 77067 SCR MAMMO BI INCL CAD: CPT | Mod: 26,,, | Performed by: RADIOLOGY

## 2024-11-26 ENCOUNTER — OFFICE VISIT (OUTPATIENT)
Dept: FAMILY MEDICINE | Facility: HOSPITAL | Age: 49
End: 2024-11-26
Payer: MEDICAID

## 2024-11-26 VITALS
HEIGHT: 55 IN | BODY MASS INDEX: 52.95 KG/M2 | HEART RATE: 73 BPM | WEIGHT: 228.81 LBS | SYSTOLIC BLOOD PRESSURE: 122 MMHG | DIASTOLIC BLOOD PRESSURE: 88 MMHG

## 2024-11-26 DIAGNOSIS — R53.83 FATIGUE, UNSPECIFIED TYPE: ICD-10-CM

## 2024-11-26 DIAGNOSIS — R42 DIZZINESS: Primary | ICD-10-CM

## 2024-11-26 LAB — GLUCOSE SERPL-MCNC: 89 MG/DL (ref 70–110)

## 2024-11-26 PROCEDURE — 99213 OFFICE O/P EST LOW 20 MIN: CPT

## 2024-11-26 PROCEDURE — 82962 GLUCOSE BLOOD TEST: CPT

## 2024-11-26 NOTE — PROGRESS NOTES
[facility]    Subjective:     Sydnee Don is a 49 y.o. year old female with PMHx of obesity, HTN, HLD who presents to clinic for follow up    #dizziness  Patient reports 2 weeks of dizziness and weakness. She states that ever since starting her tirzepatide she has been having a decreased appetite. She has been cutting carbs and drinking shakes occasionally. She is also reporting 1 month of fatigue and menstrual cycle changes. She has been missing periods about 2-3 month cycles now.   Patient is keeping track of her calories with an phone application.  Reviewed daily calorie counts and patient's goal daily calories are set to ~1500 calories. Patient's calorie ranges go from 800-1700. Discussed with patient that she is doing well on her weight loss but she may be cutting too many calories. Discussed adequate snacking and reaching a goal of a deficit of ~500 calories/day from baseline calorie intake. Unclear what her baseline intake was before tirzepatide. Discussed making sure she reaches at least 1500 calories/day with snacks and supplemental calories. Patient expressed understanding.     #weight loss  Weight at this visit is 103.8kg, weight at last visit was 116.3kg. sylvai reports about 30lb weight loss cine starting terzepatide.       Patient Active Problem List    Diagnosis Date Noted    Acute left-sided low back pain without sciatica 02/08/2024    Hip pain, left 02/08/2024    Borderline Hypercholesterolemia 01/17/2024    Elevated BP without diagnosis of hypertension 01/17/2024    Class 3 severe obesity with body mass index (BMI) of 50.0 to 59.9 in adult 01/17/2024    Other spondylosis with radiculopathy, lumbar region 01/17/2024    Rosacea 05/05/2021    Anxiety and depression 05/05/2021        Review of patient's allergies indicates:   Allergen Reactions    Bactrim [sulfamethoxazole-trimethoprim] Rash     Morbilliform rash. Evaluated in A/I clinic 6/11/2024.         Past Medical History:  "  Diagnosis Date    Anxiety and depression 2021    Cervical radiculopathy     Iron deficiency anemia due to chronic blood loss 2018    Rosacea 2021    Stress incontinence       Past Surgical History:   Procedure Laterality Date     SECTION        Family History   Problem Relation Name Age of Onset    Hyperlipidemia Mother      Hypertension Mother      Arthritis Mother      Hyperlipidemia Father      Hypertension Father      No Known Problems Sister      No Known Problems Brother      No Known Problems Maternal Aunt      No Known Problems Maternal Uncle      Breast cancer Paternal Aunt      No Known Problems Paternal Uncle      No Known Problems Maternal Grandmother      No Known Problems Maternal Grandfather      No Known Problems Paternal Grandmother      No Known Problems Paternal Grandfather      No Known Problems Other      Allergic rhinitis Neg Hx      Allergies Neg Hx      Angioedema Neg Hx      Asthma Neg Hx      Atopy Neg Hx      Eczema Neg Hx      Immunodeficiency Neg Hx      Rhinitis Neg Hx      Urticaria Neg Hx        Social History     Tobacco Use    Smoking status: Never     Passive exposure: Never    Smokeless tobacco: Never   Substance Use Topics    Alcohol use: No        Objective:     Vitals:    24 1520   BP: 122/88   Patient Position: Sitting   Pulse: 73   Weight: 103.8 kg (228 lb 13.4 oz)   Height: 4' 7" (1.397 m)     BMI: Body mass index is 53.19 kg/m².      Physical Exam  Vitals reviewed.   Constitutional:       General: She is not in acute distress.     Appearance: Normal appearance. She is not ill-appearing, toxic-appearing or diaphoretic.   HENT:      Head: Normocephalic and atraumatic.      Right Ear: External ear normal.      Left Ear: External ear normal.      Nose: No congestion or rhinorrhea.      Mouth/Throat:      Mouth: Mucous membranes are moist.      Pharynx: Oropharynx is clear. No oropharyngeal exudate or posterior oropharyngeal erythema.   Eyes:      " "General: No scleral icterus.     Extraocular Movements: Extraocular movements intact.      Pupils: Pupils are equal, round, and reactive to light.   Neck:      Vascular: No carotid bruit.   Cardiovascular:      Rate and Rhythm: Normal rate and regular rhythm.      Pulses: Normal pulses.      Heart sounds: Normal heart sounds. No murmur heard.     No friction rub. No gallop.   Pulmonary:      Effort: Pulmonary effort is normal. No respiratory distress.      Breath sounds: Normal breath sounds. No stridor. No wheezing or rhonchi.   Abdominal:      General: Abdomen is flat. There is no distension.      Palpations: Abdomen is soft.      Tenderness: There is no abdominal tenderness. There is no guarding.   Musculoskeletal:      Cervical back: Normal range of motion.   Skin:     General: Skin is warm and dry.   Neurological:      General: No focal deficit present.      Mental Status: She is alert and oriented to person, place, and time. Mental status is at baseline.   Psychiatric:         Mood and Affect: Mood normal.         Behavior: Behavior normal.          Assessment/Plan:     Sydnee Don is a 49 y.o. year old female who presents to clinic for follow up    1. Dizziness (Primary)  Patient reports 2 weeks of dizziness and 1 month of fatigue since starting her diet. Based on history and review of patient's calorie log, she is likely calorie deficient contributing to her weight loss but also to her dizziness and fatigue. Counseled patient that she is doing well with weight loss but can afford to add more calories per day and adequate hydration as well despite her decreased appetite. Recommended minimum of 1500 calories/day as  she is often getting under 1000 per her phone application. Will get labs to monitor for metabolic derangements  - POCT Glucose, Hand-Held Device  - CBC W/ AUTO DIFFERENTIAL; Future  - COMPREHENSIVE METABOLIC PANEL; Future    2. Fatigue, unspecified type  See "dizziness"   - CBC W/ AUTO " DIFFERENTIAL; Future  - COMPREHENSIVE METABOLIC PANEL; Future      Follow-up:1 month, f/u weight and dizziness/menstrual cycle changes    A total of 25 minutes was spent on patient care during this encounter which included chart review, examining the patient, formulating a treatment plan and documentation.       Ru Aguila MD  [facility], PGY-2

## 2024-12-12 RX ORDER — NORETHINDRONE ACETATE AND ETHINYL ESTRADIOL 1MG-20(21)
1 KIT ORAL
Qty: 84 TABLET | Refills: 0 | Status: SHIPPED | OUTPATIENT
Start: 2024-12-12

## 2024-12-12 NOTE — TELEPHONE ENCOUNTER
Refill Routing Note   Medication(s) are not appropriate for processing by [facility] for the following reason(s):        Patient not seen by provider within 15 months    [facility] action(s):  Defer               Appointments  past 12m or future 3m with PCP    Date Provider   Last Visit   7/7/2023 Teodora Washington MD   Next Visit   Visit date not found Teodora Washington MD   ED visits in past 90 days: 0        Note composed:5:35 AM 12/12/2024

## 2024-12-12 NOTE — PROGRESS NOTES
I assume primary medical responsibility for this patient. I have reviewed the history, physical, and assessement & treatment plan with the resident and agree that the care is reasonable and necessary. This service has been performed by a resident without the presence of a teaching physician under the primary care exception. If necessary, an addendum of additional findings or evaluation beyond the resident documentation will be noted below.      Jessica Reynolds MD    \[facility] [place] [facility]\"" Family Medicine

## 2025-01-10 ENCOUNTER — OFFICE VISIT (OUTPATIENT)
Dept: FAMILY MEDICINE | Facility: HOSPITAL | Age: 50
End: 2025-01-10
Payer: MEDICAID

## 2025-01-10 VITALS
OXYGEN SATURATION: 97 % | HEIGHT: 56 IN | BODY MASS INDEX: 49.05 KG/M2 | WEIGHT: 218.06 LBS | SYSTOLIC BLOOD PRESSURE: 113 MMHG | HEART RATE: 71 BPM | DIASTOLIC BLOOD PRESSURE: 78 MMHG

## 2025-01-10 DIAGNOSIS — E66.01 CLASS 3 SEVERE OBESITY WITHOUT SERIOUS COMORBIDITY WITH BODY MASS INDEX (BMI) OF 50.0 TO 59.9 IN ADULT, UNSPECIFIED OBESITY TYPE: ICD-10-CM

## 2025-01-10 DIAGNOSIS — M25.552 HIP PAIN, LEFT: Primary | ICD-10-CM

## 2025-01-10 DIAGNOSIS — I10 HYPERTENSION, UNSPECIFIED TYPE: ICD-10-CM

## 2025-01-10 DIAGNOSIS — M47.816 OSTEOARTHRITIS OF LUMBAR SPINE, UNSPECIFIED SPINAL OSTEOARTHRITIS COMPLICATION STATUS: ICD-10-CM

## 2025-01-10 DIAGNOSIS — E78.00 HYPERCHOLESTEROLEMIA: ICD-10-CM

## 2025-01-10 DIAGNOSIS — M47.816 OSTEOARTHRITIS OF LUMBAR SPINE, UNSPECIFIED SPINAL OSTEOARTHRITIS COMPLICATION STATUS: Primary | ICD-10-CM

## 2025-01-10 DIAGNOSIS — E66.813 CLASS 3 SEVERE OBESITY WITHOUT SERIOUS COMORBIDITY WITH BODY MASS INDEX (BMI) OF 50.0 TO 59.9 IN ADULT, UNSPECIFIED OBESITY TYPE: ICD-10-CM

## 2025-01-10 PROCEDURE — 99213 OFFICE O/P EST LOW 20 MIN: CPT

## 2025-01-10 NOTE — PROGRESS NOTES
[facility]    Subjective:     Sydnee Don is a 49 y.o. year old female with PMHx of obesity, HTN, HLD who presents to clinic for ***    #left side back/hip  Hurts while walking, laying down.   Tingling. No weakness. Radiating pain   No falls or injuries.       #weight loss  Weight at this visit 98.9kg, was 103.8 at last visit  Ozempic     -------------------  Sydnee Don is a 49 y.o. year old female with PMHx of obesity, HTN, HLD who presents to clinic for follow up     #dizziness  Patient reports 2 weeks of dizziness and weakness. She states that ever since starting her tirzepatide she has been having a decreased appetite. She has been cutting carbs and drinking shakes occasionally. She is also reporting 1 month of fatigue and menstrual cycle changes. She has been missing periods about 2-3 month cycles now.   Patient is keeping track of her calories with an phone application.  Reviewed daily calorie counts and patient's goal daily calories are set to ~1500 calories. Patient's calorie ranges go from 800-1700. Discussed with patient that she is doing well on her weight loss but she may be cutting too many calories. Discussed adequate snacking and reaching a goal of a deficit of ~500 calories/day from baseline calorie intake. Unclear what her baseline intake was before tirzepatide. Discussed making sure she reaches at least 1500 calories/day with snacks and supplemental calories. Patient expressed understanding.      #weight loss  Weight at this visit is 103.8kg, weight at last visit was 116.3kg. sylvia reports about 30lb weight loss cine starting terzepatide.         Sydnee Don is a 49 y.o. year old female who presents to clinic for follow up     1. Dizziness (Primary)  Patient reports 2 weeks of dizziness and 1 month of fatigue since starting her diet. Based on history and review of patient's calorie log, she is likely calorie deficient contributing to her weight loss but also to her dizziness  "and fatigue. Counseled patient that she is doing well with weight loss but can afford to add more calories per day and adequate hydration as well despite her decreased appetite. Recommended minimum of 1500 calories/day as  she is often getting under 1000 per her phone application. Will get labs to monitor for metabolic derangements  - POCT Glucose, Hand-Held Device  - CBC W/ AUTO DIFFERENTIAL; Future  - COMPREHENSIVE METABOLIC PANEL; Future     2. Fatigue, unspecified type  See "dizziness"   - CBC W/ AUTO DIFFERENTIAL; Future  - COMPREHENSIVE METABOLIC PANEL; Future        Follow-up:1 month, f/u weight and dizziness/menstrual cycle changes    Patient Active Problem List    Diagnosis Date Noted    Acute left-sided low back pain without sciatica 2024    Hip pain, left 2024    Borderline Hypercholesterolemia 2024    Elevated BP without diagnosis of hypertension 2024    Class 3 severe obesity with body mass index (BMI) of 50.0 to 59.9 in adult 2024    Other spondylosis with radiculopathy, lumbar region 2024    Rosacea 2021    Anxiety and depression 2021        Review of patient's allergies indicates:   Allergen Reactions    Bactrim [sulfamethoxazole-trimethoprim] Rash     Morbilliform rash. Evaluated in A/I clinic 2024.         Past Medical History:   Diagnosis Date    Anxiety and depression 2021    Cervical radiculopathy     Iron deficiency anemia due to chronic blood loss 2018    Rosacea 2021    Stress incontinence       Past Surgical History:   Procedure Laterality Date     SECTION        Family History   Problem Relation Name Age of Onset    Hyperlipidemia Mother      Hypertension Mother      Arthritis Mother      Hyperlipidemia Father      Hypertension Father      No Known Problems Sister      No Known Problems Brother      No Known Problems Maternal Aunt      No Known Problems Maternal Uncle      Breast cancer Paternal Aunt      No Known " Problems Paternal Uncle      No Known Problems Maternal Grandmother      No Known Problems Maternal Grandfather      No Known Problems Paternal Grandmother      No Known Problems Paternal Grandfather      No Known Problems Other      Allergic rhinitis Neg Hx      Allergies Neg Hx      Angioedema Neg Hx      Asthma Neg Hx      Atopy Neg Hx      Eczema Neg Hx      Immunodeficiency Neg Hx      Rhinitis Neg Hx      Urticaria Neg Hx        Social History     Tobacco Use    Smoking status: Never     Passive exposure: Never    Smokeless tobacco: Never   Substance Use Topics    Alcohol use: No        Objective:     There were no vitals filed for this visit.  BMI: ***    Physical Exam     Assessment/Plan:     Sydnee Don is a 49 y.o. year old female who presents to clinic for ***    There are no diagnoses linked to this encounter.    Follow-up:***    A total of *** minutes was spent on patient care during this encounter which included chart review, examining the patient, formulating a treatment plan and documentation.     *** Complex medical decision making performed due to multiple medical problems addressed during the visit.     *** Medical decision making straight forward and not complex during this visit.       Ru Aguila MD  [facility], PGY-2  01/10/2025

## 2025-02-17 ENCOUNTER — TELEPHONE (OUTPATIENT)
Dept: OBSTETRICS AND GYNECOLOGY | Facility: CLINIC | Age: 50
End: 2025-02-17
Payer: MEDICAID

## 2025-02-17 NOTE — TELEPHONE ENCOUNTER
Called to schedule annual. Un able to reach pt lvm for call back. Pt needs refill on : BLISOVI FE 1/20, 28, 1 mg-20 mcg (21)/75 mg (7) per tablet but also needs to be seen for annual

## 2025-03-10 RX ORDER — NORETHINDRONE ACETATE AND ETHINYL ESTRADIOL 1MG-20(21)
1 KIT ORAL DAILY
Qty: 84 TABLET | Refills: 0 | Status: SHIPPED | OUTPATIENT
Start: 2025-03-10

## 2025-03-10 NOTE — TELEPHONE ENCOUNTER
Refill Routing Note   Medication(s) are not appropriate for processing by [facility] for the following reason(s):        Patient not seen by provider within 15 months    [facility] action(s):  Defer               Appointments  past 12m or future 3m with PCP    Date Provider   Last Visit   7/7/2023 Teodora Washington MD   Next Visit   Visit date not found Teodora Washington MD   ED visits in past 90 days: 0        Note composed:9:23 AM 03/10/2025

## 2025-06-03 RX ORDER — NORETHINDRONE ACETATE AND ETHINYL ESTRADIOL 1MG-20(21)
1 KIT ORAL
Qty: 84 TABLET | Refills: 0 | OUTPATIENT
Start: 2025-06-03

## 2025-06-03 RX ORDER — NORETHINDRONE ACETATE AND ETHINYL ESTRADIOL 1MG-20(21)
1 KIT ORAL DAILY
Qty: 84 TABLET | Refills: 0 | Status: SHIPPED | OUTPATIENT
Start: 2025-06-03